# Patient Record
Sex: FEMALE | Race: WHITE | NOT HISPANIC OR LATINO | Employment: OTHER | URBAN - METROPOLITAN AREA
[De-identification: names, ages, dates, MRNs, and addresses within clinical notes are randomized per-mention and may not be internally consistent; named-entity substitution may affect disease eponyms.]

---

## 2017-02-21 ENCOUNTER — COMMUNICATION - HEALTHEAST (OUTPATIENT)
Dept: FAMILY MEDICINE | Facility: CLINIC | Age: 62
End: 2017-02-21

## 2017-02-21 DIAGNOSIS — J31.0 RHINITIS: ICD-10-CM

## 2017-02-25 ENCOUNTER — COMMUNICATION - HEALTHEAST (OUTPATIENT)
Dept: FAMILY MEDICINE | Facility: CLINIC | Age: 62
End: 2017-02-25

## 2017-02-25 DIAGNOSIS — I10 UNSPECIFIED ESSENTIAL HYPERTENSION: ICD-10-CM

## 2017-03-28 ENCOUNTER — COMMUNICATION - HEALTHEAST (OUTPATIENT)
Dept: FAMILY MEDICINE | Facility: CLINIC | Age: 62
End: 2017-03-28

## 2017-04-13 ENCOUNTER — HOSPITAL ENCOUNTER (OUTPATIENT)
Dept: MAMMOGRAPHY | Facility: HOSPITAL | Age: 62
Discharge: HOME | End: 2017-04-13
Attending: FAMILY MEDICINE

## 2017-04-13 DIAGNOSIS — Z12.31 VISIT FOR SCREENING MAMMOGRAM: ICD-10-CM

## 2017-04-18 ENCOUNTER — OFFICE VISIT - HEALTHEAST (OUTPATIENT)
Dept: FAMILY MEDICINE | Facility: CLINIC | Age: 62
End: 2017-04-18

## 2017-04-18 DIAGNOSIS — B96.89 ACUTE BACTERIAL SINUSITIS: ICD-10-CM

## 2017-04-18 DIAGNOSIS — R05.8 POST-VIRAL COUGH SYNDROME: ICD-10-CM

## 2017-04-18 DIAGNOSIS — J01.90 ACUTE BACTERIAL SINUSITIS: ICD-10-CM

## 2017-04-22 ENCOUNTER — COMMUNICATION - HEALTHEAST (OUTPATIENT)
Dept: FAMILY MEDICINE | Facility: CLINIC | Age: 62
End: 2017-04-22

## 2017-05-01 ENCOUNTER — COMMUNICATION - HEALTHEAST (OUTPATIENT)
Dept: SCHEDULING | Facility: CLINIC | Age: 62
End: 2017-05-01

## 2017-05-01 ENCOUNTER — OFFICE VISIT - HEALTHEAST (OUTPATIENT)
Dept: FAMILY MEDICINE | Facility: CLINIC | Age: 62
End: 2017-05-01

## 2017-05-01 DIAGNOSIS — J40 BRONCHITIS: ICD-10-CM

## 2017-05-01 RX ORDER — ALBUTEROL SULFATE 90 UG/1
2 AEROSOL, METERED RESPIRATORY (INHALATION) EVERY 6 HOURS PRN
Qty: 1 EACH | Refills: 3 | Status: SHIPPED | OUTPATIENT
Start: 2017-05-01 | End: 2022-08-22

## 2017-05-27 ENCOUNTER — COMMUNICATION - HEALTHEAST (OUTPATIENT)
Dept: FAMILY MEDICINE | Facility: CLINIC | Age: 62
End: 2017-05-27

## 2017-05-27 DIAGNOSIS — I10 UNSPECIFIED ESSENTIAL HYPERTENSION: ICD-10-CM

## 2017-06-20 ENCOUNTER — COMMUNICATION - HEALTHEAST (OUTPATIENT)
Dept: FAMILY MEDICINE | Facility: CLINIC | Age: 62
End: 2017-06-20

## 2017-07-03 ENCOUNTER — COMMUNICATION - HEALTHEAST (OUTPATIENT)
Dept: FAMILY MEDICINE | Facility: CLINIC | Age: 62
End: 2017-07-03

## 2017-07-03 DIAGNOSIS — J31.0 RHINITIS: ICD-10-CM

## 2017-07-05 ENCOUNTER — COMMUNICATION - HEALTHEAST (OUTPATIENT)
Dept: FAMILY MEDICINE | Facility: CLINIC | Age: 62
End: 2017-07-05

## 2017-07-05 DIAGNOSIS — F32.89 DEPRESSIVE DISORDER, NOT ELSEWHERE CLASSIFIED: ICD-10-CM

## 2017-09-09 ENCOUNTER — COMMUNICATION - HEALTHEAST (OUTPATIENT)
Dept: FAMILY MEDICINE | Facility: CLINIC | Age: 62
End: 2017-09-09

## 2017-09-09 DIAGNOSIS — F32.89 DEPRESSIVE DISORDER, NOT ELSEWHERE CLASSIFIED: ICD-10-CM

## 2017-10-03 ENCOUNTER — OFFICE VISIT - HEALTHEAST (OUTPATIENT)
Dept: FAMILY MEDICINE | Facility: CLINIC | Age: 62
End: 2017-10-03

## 2017-10-03 ENCOUNTER — COMMUNICATION - HEALTHEAST (OUTPATIENT)
Dept: TELEHEALTH | Facility: CLINIC | Age: 62
End: 2017-10-03

## 2017-10-03 DIAGNOSIS — M79.673 FOOT PAIN: ICD-10-CM

## 2017-10-03 DIAGNOSIS — F32.4 MAJOR DEPRESSIVE DISORDER WITH SINGLE EPISODE, IN PARTIAL REMISSION (H): ICD-10-CM

## 2017-10-03 DIAGNOSIS — J31.0 RHINITIS: ICD-10-CM

## 2017-10-03 DIAGNOSIS — I10 ESSENTIAL HYPERTENSION: ICD-10-CM

## 2017-10-03 RX ORDER — MOMETASONE FUROATE MONOHYDRATE 50 UG/1
SPRAY, METERED NASAL
Qty: 51 G | Refills: 3 | Status: SHIPPED | OUTPATIENT
Start: 2017-10-03 | End: 2022-08-22

## 2017-10-04 ENCOUNTER — COMMUNICATION - HEALTHEAST (OUTPATIENT)
Dept: FAMILY MEDICINE | Facility: CLINIC | Age: 62
End: 2017-10-04

## 2017-10-23 ENCOUNTER — COMMUNICATION - HEALTHEAST (OUTPATIENT)
Dept: FAMILY MEDICINE | Facility: CLINIC | Age: 62
End: 2017-10-23

## 2017-10-23 DIAGNOSIS — F32.A DEPRESSION: ICD-10-CM

## 2017-12-05 ENCOUNTER — COMMUNICATION - HEALTHEAST (OUTPATIENT)
Dept: FAMILY MEDICINE | Facility: CLINIC | Age: 62
End: 2017-12-05

## 2017-12-05 DIAGNOSIS — I10 ESSENTIAL HYPERTENSION: ICD-10-CM

## 2018-02-14 ENCOUNTER — COMMUNICATION - HEALTHEAST (OUTPATIENT)
Dept: SCHEDULING | Facility: CLINIC | Age: 63
End: 2018-02-14

## 2018-02-14 ENCOUNTER — OFFICE VISIT - HEALTHEAST (OUTPATIENT)
Dept: FAMILY MEDICINE | Facility: CLINIC | Age: 63
End: 2018-02-14

## 2018-02-14 DIAGNOSIS — R21 RASH: ICD-10-CM

## 2018-02-14 DIAGNOSIS — R32 INCONTINENCE: ICD-10-CM

## 2018-02-14 LAB
ALBUMIN UR-MCNC: NEGATIVE MG/DL
APPEARANCE UR: CLEAR
BACTERIA #/AREA URNS HPF: ABNORMAL HPF
BILIRUB UR QL STRIP: NEGATIVE
COLOR UR AUTO: YELLOW
GLUCOSE UR STRIP-MCNC: NEGATIVE MG/DL
HGB UR QL STRIP: ABNORMAL
KETONES UR STRIP-MCNC: NEGATIVE MG/DL
LEUKOCYTE ESTERASE UR QL STRIP: ABNORMAL
MUCOUS THREADS #/AREA URNS LPF: ABNORMAL LPF
NITRATE UR QL: NEGATIVE
PH UR STRIP: 7 [PH] (ref 5–8)
RBC #/AREA URNS AUTO: ABNORMAL HPF
SP GR UR STRIP: 1.02 (ref 1–1.03)
SQUAMOUS #/AREA URNS AUTO: ABNORMAL LPF
UROBILINOGEN UR STRIP-ACNC: ABNORMAL
WBC #/AREA URNS AUTO: ABNORMAL HPF

## 2018-02-14 ASSESSMENT — MIFFLIN-ST. JEOR: SCORE: 1758.98

## 2018-02-15 LAB — BACTERIA SPEC CULT: NO GROWTH

## 2018-02-26 ENCOUNTER — RECORDS - HEALTHEAST (OUTPATIENT)
Dept: ADMINISTRATIVE | Facility: OTHER | Age: 63
End: 2018-02-26

## 2018-05-27 ENCOUNTER — COMMUNICATION - HEALTHEAST (OUTPATIENT)
Dept: FAMILY MEDICINE | Facility: CLINIC | Age: 63
End: 2018-05-27

## 2018-05-27 DIAGNOSIS — I10 ESSENTIAL HYPERTENSION: ICD-10-CM

## 2018-05-30 ENCOUNTER — COMMUNICATION - HEALTHEAST (OUTPATIENT)
Dept: FAMILY MEDICINE | Facility: CLINIC | Age: 63
End: 2018-05-30

## 2018-10-16 ENCOUNTER — COMMUNICATION - HEALTHEAST (OUTPATIENT)
Dept: FAMILY MEDICINE | Facility: CLINIC | Age: 63
End: 2018-10-16

## 2018-10-16 DIAGNOSIS — F32.A DEPRESSION: ICD-10-CM

## 2018-10-17 ENCOUNTER — COMMUNICATION - HEALTHEAST (OUTPATIENT)
Dept: FAMILY MEDICINE | Facility: CLINIC | Age: 63
End: 2018-10-17

## 2018-10-17 DIAGNOSIS — J32.9 SINUSITIS: ICD-10-CM

## 2018-10-17 DIAGNOSIS — I10 ESSENTIAL HYPERTENSION: ICD-10-CM

## 2018-10-23 ENCOUNTER — AMBULATORY - HEALTHEAST (OUTPATIENT)
Dept: NURSING | Facility: CLINIC | Age: 63
End: 2018-10-23

## 2018-10-23 DIAGNOSIS — Z23 FLU VACCINE NEED: ICD-10-CM

## 2018-10-24 ENCOUNTER — COMMUNICATION - HEALTHEAST (OUTPATIENT)
Dept: FAMILY MEDICINE | Facility: CLINIC | Age: 63
End: 2018-10-24

## 2018-11-30 ENCOUNTER — COMMUNICATION - HEALTHEAST (OUTPATIENT)
Dept: FAMILY MEDICINE | Facility: CLINIC | Age: 63
End: 2018-11-30

## 2018-11-30 DIAGNOSIS — I10 ESSENTIAL HYPERTENSION: ICD-10-CM

## 2018-12-21 ENCOUNTER — COMMUNICATION - HEALTHEAST (OUTPATIENT)
Dept: FAMILY MEDICINE | Facility: CLINIC | Age: 63
End: 2018-12-21

## 2019-01-19 ENCOUNTER — COMMUNICATION - HEALTHEAST (OUTPATIENT)
Dept: FAMILY MEDICINE | Facility: CLINIC | Age: 64
End: 2019-01-19

## 2019-01-19 DIAGNOSIS — F32.A DEPRESSION: ICD-10-CM

## 2019-01-23 ENCOUNTER — COMMUNICATION - HEALTHEAST (OUTPATIENT)
Dept: FAMILY MEDICINE | Facility: CLINIC | Age: 64
End: 2019-01-23

## 2019-04-08 ENCOUNTER — RECORDS - HEALTHEAST (OUTPATIENT)
Dept: ADMINISTRATIVE | Facility: OTHER | Age: 64
End: 2019-04-08

## 2019-04-18 ENCOUNTER — COMMUNICATION - HEALTHEAST (OUTPATIENT)
Dept: FAMILY MEDICINE | Facility: CLINIC | Age: 64
End: 2019-04-18

## 2019-04-18 DIAGNOSIS — F32.A DEPRESSION: ICD-10-CM

## 2019-07-16 ENCOUNTER — COMMUNICATION - HEALTHEAST (OUTPATIENT)
Dept: FAMILY MEDICINE | Facility: CLINIC | Age: 64
End: 2019-07-16

## 2019-07-16 DIAGNOSIS — F32.A DEPRESSION: ICD-10-CM

## 2019-08-13 ENCOUNTER — OFFICE VISIT - HEALTHEAST (OUTPATIENT)
Dept: FAMILY MEDICINE | Facility: CLINIC | Age: 64
End: 2019-08-13

## 2019-08-13 ENCOUNTER — AMBULATORY - HEALTHEAST (OUTPATIENT)
Dept: FAMILY MEDICINE | Facility: CLINIC | Age: 64
End: 2019-08-13

## 2019-08-13 DIAGNOSIS — Z00.01 ENCOUNTER FOR GENERAL ADULT MEDICAL EXAMINATION WITH ABNORMAL FINDINGS: ICD-10-CM

## 2019-08-13 DIAGNOSIS — Z12.31 VISIT FOR SCREENING MAMMOGRAM: ICD-10-CM

## 2019-08-13 DIAGNOSIS — Z23 ENCOUNTER FOR IMMUNIZATION: ICD-10-CM

## 2019-08-13 DIAGNOSIS — R31.29 MICROSCOPIC HEMATURIA: ICD-10-CM

## 2019-08-13 DIAGNOSIS — F32.4 MAJOR DEPRESSIVE DISORDER WITH SINGLE EPISODE, IN PARTIAL REMISSION (H): ICD-10-CM

## 2019-08-13 DIAGNOSIS — E78.5 DYSLIPIDEMIA: ICD-10-CM

## 2019-08-13 DIAGNOSIS — I10 ESSENTIAL HYPERTENSION: ICD-10-CM

## 2019-08-13 LAB
ALBUMIN SERPL-MCNC: 4.2 G/DL (ref 3.5–5)
ALBUMIN UR-MCNC: NEGATIVE MG/DL
ALP SERPL-CCNC: 76 U/L (ref 45–120)
ALT SERPL W P-5'-P-CCNC: 26 U/L (ref 0–45)
ANION GAP SERPL CALCULATED.3IONS-SCNC: 6 MMOL/L (ref 5–18)
APPEARANCE UR: CLEAR
AST SERPL W P-5'-P-CCNC: 20 U/L (ref 0–40)
BACTERIA #/AREA URNS HPF: ABNORMAL HPF
BILIRUB SERPL-MCNC: 0.9 MG/DL (ref 0–1)
BILIRUB UR QL STRIP: ABNORMAL
BUN SERPL-MCNC: 15 MG/DL (ref 8–22)
CALCIUM SERPL-MCNC: 10.4 MG/DL (ref 8.5–10.5)
CHLORIDE BLD-SCNC: 105 MMOL/L (ref 98–107)
CHOLEST SERPL-MCNC: 169 MG/DL
CO2 SERPL-SCNC: 31 MMOL/L (ref 22–31)
COLOR UR AUTO: YELLOW
CREAT SERPL-MCNC: 0.78 MG/DL (ref 0.6–1.1)
FASTING STATUS PATIENT QL REPORTED: YES
GFR SERPL CREATININE-BSD FRML MDRD: >60 ML/MIN/1.73M2
GLUCOSE BLD-MCNC: 121 MG/DL (ref 70–125)
GLUCOSE UR STRIP-MCNC: NEGATIVE MG/DL
HDLC SERPL-MCNC: 36 MG/DL
HGB UR QL STRIP: ABNORMAL
KETONES UR STRIP-MCNC: NEGATIVE MG/DL
LDLC SERPL CALC-MCNC: 103 MG/DL
LEUKOCYTE ESTERASE UR QL STRIP: ABNORMAL
MUCOUS THREADS #/AREA URNS LPF: ABNORMAL LPF
NITRATE UR QL: NEGATIVE
PH UR STRIP: 6 [PH] (ref 5–8)
POTASSIUM BLD-SCNC: 4.2 MMOL/L (ref 3.5–5)
PROT SERPL-MCNC: 7 G/DL (ref 6–8)
RBC #/AREA URNS AUTO: ABNORMAL HPF
SODIUM SERPL-SCNC: 142 MMOL/L (ref 136–145)
SP GR UR STRIP: 1.02 (ref 1–1.03)
SQUAMOUS #/AREA URNS AUTO: ABNORMAL LPF
TRIGL SERPL-MCNC: 149 MG/DL
UROBILINOGEN UR STRIP-ACNC: ABNORMAL
WBC #/AREA URNS AUTO: ABNORMAL HPF

## 2019-08-13 ASSESSMENT — MIFFLIN-ST. JEOR: SCORE: 1711.59

## 2019-08-14 LAB — BACTERIA SPEC CULT: NO GROWTH

## 2019-08-27 ENCOUNTER — HOSPITAL ENCOUNTER (OUTPATIENT)
Dept: MAMMOGRAPHY | Facility: CLINIC | Age: 64
Discharge: HOME | End: 2019-08-27
Attending: FAMILY MEDICINE

## 2019-08-27 DIAGNOSIS — Z12.31 VISIT FOR SCREENING MAMMOGRAM: ICD-10-CM

## 2019-10-19 ENCOUNTER — COMMUNICATION - HEALTHEAST (OUTPATIENT)
Dept: FAMILY MEDICINE | Facility: CLINIC | Age: 64
End: 2019-10-19

## 2019-10-19 DIAGNOSIS — F32.A DEPRESSION: ICD-10-CM

## 2019-11-04 ENCOUNTER — COMMUNICATION - HEALTHEAST (OUTPATIENT)
Dept: FAMILY MEDICINE | Facility: CLINIC | Age: 64
End: 2019-11-04

## 2019-11-04 DIAGNOSIS — J32.9 SINUSITIS: ICD-10-CM

## 2020-02-03 ENCOUNTER — COMMUNICATION - HEALTHEAST (OUTPATIENT)
Dept: FAMILY MEDICINE | Facility: CLINIC | Age: 65
End: 2020-02-03

## 2020-02-03 DIAGNOSIS — I10 ESSENTIAL HYPERTENSION: ICD-10-CM

## 2020-07-02 ENCOUNTER — COMMUNICATION - HEALTHEAST (OUTPATIENT)
Dept: SCHEDULING | Facility: CLINIC | Age: 65
End: 2020-07-02

## 2020-07-02 ENCOUNTER — OFFICE VISIT - HEALTHEAST (OUTPATIENT)
Dept: FAMILY MEDICINE | Facility: CLINIC | Age: 65
End: 2020-07-02

## 2020-07-02 DIAGNOSIS — N39.0 ACUTE UTI: ICD-10-CM

## 2020-07-02 DIAGNOSIS — R30.0 DYSURIA: ICD-10-CM

## 2020-07-02 LAB
ALBUMIN UR-MCNC: ABNORMAL MG/DL
APPEARANCE UR: ABNORMAL
BACTERIA #/AREA URNS HPF: ABNORMAL HPF
BILIRUB UR QL STRIP: NEGATIVE
COLOR UR AUTO: YELLOW
GLUCOSE UR STRIP-MCNC: NEGATIVE MG/DL
HGB UR QL STRIP: ABNORMAL
KETONES UR STRIP-MCNC: NEGATIVE MG/DL
LEUKOCYTE ESTERASE UR QL STRIP: ABNORMAL
NITRATE UR QL: NEGATIVE
PH UR STRIP: 7 [PH] (ref 5–8)
RBC #/AREA URNS AUTO: ABNORMAL HPF
SP GR UR STRIP: 1.02 (ref 1–1.03)
SQUAMOUS #/AREA URNS AUTO: ABNORMAL LPF
UROBILINOGEN UR STRIP-ACNC: ABNORMAL
WBC #/AREA URNS AUTO: ABNORMAL HPF
WBC CLUMPS #/AREA URNS HPF: PRESENT /[HPF]

## 2020-07-02 ASSESSMENT — PATIENT HEALTH QUESTIONNAIRE - PHQ9: SUM OF ALL RESPONSES TO PHQ QUESTIONS 1-9: 0

## 2020-07-05 LAB — BACTERIA SPEC CULT: ABNORMAL

## 2020-08-11 ENCOUNTER — RECORDS - HEALTHEAST (OUTPATIENT)
Dept: ADMINISTRATIVE | Facility: OTHER | Age: 65
End: 2020-08-11

## 2020-08-17 ENCOUNTER — OFFICE VISIT - HEALTHEAST (OUTPATIENT)
Dept: FAMILY MEDICINE | Facility: CLINIC | Age: 65
End: 2020-08-17

## 2020-08-17 DIAGNOSIS — Z00.01 ENCOUNTER FOR GENERAL ADULT MEDICAL EXAMINATION WITH ABNORMAL FINDINGS: ICD-10-CM

## 2020-08-17 DIAGNOSIS — I10 ESSENTIAL HYPERTENSION: ICD-10-CM

## 2020-08-17 DIAGNOSIS — F32.4 MAJOR DEPRESSIVE DISORDER WITH SINGLE EPISODE, IN PARTIAL REMISSION (H): ICD-10-CM

## 2020-08-17 DIAGNOSIS — E78.5 DYSLIPIDEMIA: ICD-10-CM

## 2020-08-17 DIAGNOSIS — R31.29 MICROSCOPIC HEMATURIA: ICD-10-CM

## 2020-08-17 LAB
ALBUMIN SERPL-MCNC: 4.3 G/DL (ref 3.5–5)
ALBUMIN UR-MCNC: NEGATIVE MG/DL
ALP SERPL-CCNC: 71 U/L (ref 45–120)
ALT SERPL W P-5'-P-CCNC: 23 U/L (ref 0–45)
ANION GAP SERPL CALCULATED.3IONS-SCNC: 9 MMOL/L (ref 5–18)
APPEARANCE UR: CLEAR
AST SERPL W P-5'-P-CCNC: 18 U/L (ref 0–40)
BACTERIA #/AREA URNS HPF: ABNORMAL HPF
BILIRUB SERPL-MCNC: 1.6 MG/DL (ref 0–1)
BILIRUB UR QL STRIP: NEGATIVE
BUN SERPL-MCNC: 15 MG/DL (ref 8–22)
CALCIUM SERPL-MCNC: 10.3 MG/DL (ref 8.5–10.5)
CHLORIDE BLD-SCNC: 101 MMOL/L (ref 98–107)
CHOLEST SERPL-MCNC: 207 MG/DL
CO2 SERPL-SCNC: 31 MMOL/L (ref 22–31)
COLOR UR AUTO: YELLOW
CREAT SERPL-MCNC: 0.78 MG/DL (ref 0.6–1.1)
FASTING STATUS PATIENT QL REPORTED: YES
GFR SERPL CREATININE-BSD FRML MDRD: >60 ML/MIN/1.73M2
GLUCOSE BLD-MCNC: 116 MG/DL (ref 70–125)
GLUCOSE UR STRIP-MCNC: NEGATIVE MG/DL
HDLC SERPL-MCNC: 39 MG/DL
HGB UR QL STRIP: ABNORMAL
KETONES UR STRIP-MCNC: NEGATIVE MG/DL
LDLC SERPL CALC-MCNC: 134 MG/DL
LEUKOCYTE ESTERASE UR QL STRIP: ABNORMAL
MUCOUS THREADS #/AREA URNS LPF: ABNORMAL LPF
NITRATE UR QL: NEGATIVE
PH UR STRIP: 6.5 [PH] (ref 5–8)
POTASSIUM BLD-SCNC: 4.2 MMOL/L (ref 3.5–5)
PROT SERPL-MCNC: 7.2 G/DL (ref 6–8)
RBC #/AREA URNS AUTO: ABNORMAL HPF
SODIUM SERPL-SCNC: 141 MMOL/L (ref 136–145)
SP GR UR STRIP: 1.02 (ref 1–1.03)
SQUAMOUS #/AREA URNS AUTO: ABNORMAL LPF
TRANS CELLS #/AREA URNS HPF: ABNORMAL LPF
TRIGL SERPL-MCNC: 168 MG/DL
UROBILINOGEN UR STRIP-ACNC: ABNORMAL
WBC #/AREA URNS AUTO: ABNORMAL HPF
WBC CLUMPS #/AREA URNS HPF: PRESENT /[HPF]

## 2020-08-17 ASSESSMENT — PATIENT HEALTH QUESTIONNAIRE - PHQ9: SUM OF ALL RESPONSES TO PHQ QUESTIONS 1-9: 1

## 2020-08-17 ASSESSMENT — MIFFLIN-ST. JEOR: SCORE: 1685.28

## 2020-08-18 LAB — BACTERIA SPEC CULT: NO GROWTH

## 2020-08-24 ENCOUNTER — COMMUNICATION - HEALTHEAST (OUTPATIENT)
Dept: FAMILY MEDICINE | Facility: CLINIC | Age: 65
End: 2020-08-24

## 2020-08-24 DIAGNOSIS — F32.A DEPRESSION: ICD-10-CM

## 2020-08-24 DIAGNOSIS — I10 ESSENTIAL HYPERTENSION: ICD-10-CM

## 2020-08-26 RX ORDER — CITALOPRAM HYDROBROMIDE 20 MG/1
20 TABLET ORAL DAILY
Qty: 90 TABLET | Refills: 3 | Status: SHIPPED | OUTPATIENT
Start: 2020-08-26 | End: 2021-10-10

## 2020-08-26 RX ORDER — ATENOLOL 50 MG/1
TABLET ORAL
Qty: 90 TABLET | Refills: 3 | Status: SHIPPED | OUTPATIENT
Start: 2020-08-26 | End: 2021-08-17

## 2020-08-26 RX ORDER — CHLORTHALIDONE 25 MG/1
TABLET ORAL
Qty: 90 TABLET | Refills: 3 | Status: SHIPPED | OUTPATIENT
Start: 2020-08-26 | End: 2021-08-17

## 2020-09-15 ENCOUNTER — RECORDS - HEALTHEAST (OUTPATIENT)
Dept: ADMINISTRATIVE | Facility: OTHER | Age: 65
End: 2020-09-15

## 2020-10-19 ENCOUNTER — RECORDS - HEALTHEAST (OUTPATIENT)
Dept: ADMINISTRATIVE | Facility: OTHER | Age: 65
End: 2020-10-19

## 2020-10-30 ENCOUNTER — COMMUNICATION - HEALTHEAST (OUTPATIENT)
Dept: FAMILY MEDICINE | Facility: CLINIC | Age: 65
End: 2020-10-30

## 2020-10-30 DIAGNOSIS — J32.9 SINUSITIS: ICD-10-CM

## 2020-11-10 ENCOUNTER — RECORDS - HEALTHEAST (OUTPATIENT)
Dept: ADMINISTRATIVE | Facility: OTHER | Age: 65
End: 2020-11-10

## 2021-04-28 ENCOUNTER — COMMUNICATION - HEALTHEAST (OUTPATIENT)
Dept: FAMILY MEDICINE | Facility: CLINIC | Age: 66
End: 2021-04-28

## 2021-04-28 DIAGNOSIS — J32.9 SINUSITIS: ICD-10-CM

## 2021-05-25 ENCOUNTER — RECORDS - HEALTHEAST (OUTPATIENT)
Dept: ADMINISTRATIVE | Facility: CLINIC | Age: 66
End: 2021-05-25

## 2021-05-26 ENCOUNTER — RECORDS - HEALTHEAST (OUTPATIENT)
Dept: ADMINISTRATIVE | Facility: CLINIC | Age: 66
End: 2021-05-26

## 2021-05-26 ASSESSMENT — PATIENT HEALTH QUESTIONNAIRE - PHQ9: SUM OF ALL RESPONSES TO PHQ QUESTIONS 1-9: 0

## 2021-05-27 ENCOUNTER — RECORDS - HEALTHEAST (OUTPATIENT)
Dept: ADMINISTRATIVE | Facility: CLINIC | Age: 66
End: 2021-05-27

## 2021-05-27 ASSESSMENT — PATIENT HEALTH QUESTIONNAIRE - PHQ9: SUM OF ALL RESPONSES TO PHQ QUESTIONS 1-9: 1

## 2021-05-27 NOTE — TELEPHONE ENCOUNTER
RN cannot approve Refill Request    RN can NOT refill this medication overdue for office visits and/or labs.    Catarino Smith, [facility] Connection Triage/Med Refill 4/18/2019    Requested Prescriptions   Pending Prescriptions Disp Refills     citalopram (CELEXA) 20 MG tablet [Pharmacy Med Name: Citalopram Hydrobromide Oral Tablet 20 MG] 90 tablet 0     Sig: Take 1 tablet (20 mg total) by mouth daily.       SSRI Refill Protocol  Failed - 4/18/2019  7:01 AM        Failed - PCP or prescribing provider visit in last year     Last office visit with prescriber/PCP: 10/3/2017 Catarino Nicholson MD OR same dept: Visit date not found OR same specialty: 2/14/2018 Lizzette Bravo MD  Last physical: 5/20/2016 Last MTM visit: Visit date not found   Next visit within 3 mo: Visit date not found  Next physical within 3 mo: Visit date not found  Prescriber OR PCP: Catarino Nicholson MD  Last diagnosis associated with med order: 1. Depression  - citalopram (CELEXA) 20 MG tablet [Pharmacy Med Name: Citalopram Hydrobromide Oral Tablet 20 MG]; Take 1 tablet (20 mg total) by mouth daily.  Dispense: 90 tablet; Refill: 0    If protocol passes may refill for 12 months if within 3 months of last provider visit (or a total of 15 months).

## 2021-05-28 ENCOUNTER — RECORDS - HEALTHEAST (OUTPATIENT)
Dept: ADMINISTRATIVE | Facility: CLINIC | Age: 66
End: 2021-05-28

## 2021-05-29 ENCOUNTER — RECORDS - HEALTHEAST (OUTPATIENT)
Dept: ADMINISTRATIVE | Facility: CLINIC | Age: 66
End: 2021-05-29

## 2021-05-30 ENCOUNTER — RECORDS - HEALTHEAST (OUTPATIENT)
Dept: ADMINISTRATIVE | Facility: CLINIC | Age: 66
End: 2021-05-30

## 2021-05-30 VITALS — WEIGHT: 260.3 LBS | BODY MASS INDEX: 40.17 KG/M2

## 2021-05-30 NOTE — TELEPHONE ENCOUNTER
RN cannot approve Refill Request    RN can NOT refill this medication overdue for office visits and/or labs.    Catarino Smith, [facility] Connection Triage/Med Refill 7/16/2019    Requested Prescriptions   Pending Prescriptions Disp Refills     citalopram (CELEXA) 20 MG tablet [Pharmacy Med Name: Citalopram Hydrobromide Oral Tablet 20 MG] 90 tablet 0     Sig: Take 1 tablet (20 mg total) by mouth daily.       SSRI Refill Protocol  Failed - 7/16/2019  2:07 PM        Failed - PCP or prescribing provider visit in last year     Last office visit with prescriber/PCP: 10/3/2017 Catarino Nicholson MD OR same dept: Visit date not found OR same specialty: 2/14/2018 Lizzette Bravo MD  Last physical: 5/20/2016 Last MTM visit: Visit date not found   Next visit within 3 mo: Visit date not found  Next physical within 3 mo: Visit date not found  Prescriber OR PCP: Catarino Nicholson MD  Last diagnosis associated with med order: 1. Depression  - citalopram (CELEXA) 20 MG tablet [Pharmacy Med Name: Citalopram Hydrobromide Oral Tablet 20 MG]; Take 1 tablet (20 mg total) by mouth daily.  Dispense: 90 tablet; Refill: 0    If protocol passes may refill for 12 months if within 3 months of last provider visit (or a total of 15 months).

## 2021-05-30 NOTE — TELEPHONE ENCOUNTER
RN cannot approve Refill Request    RN can NOT refill this medication overdue for office visits and/or labs.    Catarino Smith, [facility] Connection Triage/Med Refill 7/16/2019    Requested Prescriptions   Pending Prescriptions Disp Refills     citalopram (CELEXA) 20 MG tablet 90 tablet 0     Sig: Take 1 tablet (20 mg total) by mouth daily.       SSRI Refill Protocol  Failed - 7/16/2019  8:38 AM        Failed - PCP or prescribing provider visit in last year     Last office visit with prescriber/PCP: 10/3/2017 Catarino Nicholson MD OR same dept: Visit date not found OR same specialty: 2/14/2018 Lizzette Bravo MD  Last physical: 5/20/2016 Last MTM visit: Visit date not found   Next visit within 3 mo: Visit date not found  Next physical within 3 mo: Visit date not found  Prescriber OR PCP: Catarino Nicholson MD  Last diagnosis associated with med order: 1. Depression  - citalopram (CELEXA) 20 MG tablet; Take 1 tablet (20 mg total) by mouth daily.  Dispense: 90 tablet; Refill: 0    If protocol passes may refill for 12 months if within 3 months of last provider visit (or a total of 15 months).

## 2021-05-31 ENCOUNTER — RECORDS - HEALTHEAST (OUTPATIENT)
Dept: ADMINISTRATIVE | Facility: CLINIC | Age: 66
End: 2021-05-31

## 2021-05-31 VITALS — BODY MASS INDEX: 40.12 KG/M2 | WEIGHT: 260 LBS

## 2021-05-31 NOTE — PROGRESS NOTES
Assessment and Plan:     Clarisse was seen today for annual wellness visit.    Encounter for general adult medical examination with abnormal findings  -     Pneumococcal conjugate vaccine 13-valent 6wks-17yrs; >50yrs  -     Ambulatory referral for Colonoscopy    Visit for screening mammogram  -     Mammo Screening Bilateral; Future    Hypertension  -     Comprehensive Metabolic Panel    Major depressive disorder with single episode, in partial remission (H)    Dyslipidemia  -     Lipid Cascade    Encounter for immunization   -     Pneumococcal conjugate vaccine 13-valent 6wks-17yrs; >50yrs    Microscopic hematuria  -     Urinalysis-UC if Indicated        The patient's current medical problems were reviewed.    The following high BMI interventions were performed this visit: encouragement to exercise and weight monitoring  The following health maintenance schedule was reviewed with the patient and provided in printed form in the after visit summary:   Health Maintenance   Topic Date Due     HEPATITIS C SCREENING  1955     ADVANCE CARE PLANNING  03/11/1971     ZOSTER VACCINES (2 of 3) 10/16/2013     MEDICARE ANNUAL WELLNESS VISIT  03/11/2018     DXA SCAN  03/11/2018     PNEUMOCOCCAL POLYSACCHARIDE VACCINE AGE 65 AND OVER  03/11/2018     PNEUMOCOCCAL CONJUGATE VACCINE FOR ADULTS (PCV13 OR PREVNAR)  03/11/2018     FALL RISK ASSESSMENT  03/11/2018     DEPRESSION FOLLOW UP  04/03/2018     MAMMOGRAM  04/13/2019     INFLUENZA VACCINE RULE BASED (1) 08/01/2019     TD 18+ HE  10/15/2020     COLONOSCOPY  06/23/2021        Subjective:   Chief Complaint: Clarisse Romero is an 64 y.o. female here for an Annual Wellness visit.   HPI: Her medical history significant for hypertension she is on atenolol and chlorthalidone blood pressure is well controlled.  She is chronic rhinitis uses Nasonex which works well.  She is due for routine screening.  She is overdue for mammography.  She is a family history of colon cancer in her father  at a young age.  Her last colonoscopy was in 2011, she is 3 years overdue for 5-year follow-up because of the family history she is willing to get this scheduled as soon as possible.  Discussed update of immunizations including Prevnar 13, shingles vaccine and flu shot.  She will get the Prevnar 13 today, she will obtain flu shot at a later date, she will look into coverage for shingles vaccine.  She was seen this past summer for acute cystitis and had noted hematuria.  Records reviewed in care everywhere.  Discussed follow-up.  Patient does report urinary issues which are more common including occasional bouts of incontinence and sense of urgency.  Discussed this in detail.  She has a history of depression that is in remission she remains on citalopram.    Review of Systems: Please see above.  The rest of the review of systems are negative for all systems.    Patient Care Team:  Catarino Nicholson MD as PCP - General     Patient Active Problem List   Diagnosis     Major depression in partial remission (H)     Migraine Headache     Vaginal Discharge     Pain During Urination (Dysuria)     Herpes Zoster (Shingles)     Joint Pain, Localized In The Knee     Sprained Right Knee     Closed Fracture Of The Right Lateral Tibial Plateau     Contusion With Intact Skin Surface Of The Right Great Toenail     Hypertension     Acute Sinusitis     Skin Neoplasm Of Uncertain Behavior     Sinusitis     No past medical history on file.   Past Surgical History:   Procedure Laterality Date     CARPAL TUNNEL RELEASE  06/2016     TRIGGER FINGER RELEASE  06/2016      Family History   Problem Relation Age of Onset     Breast cancer Maternal Aunt       Social History     Socioeconomic History     Marital status:      Spouse name: Not on file     Number of children: Not on file     Years of education: Not on file     Highest education level: Not on file   Occupational History     Not on file   Social Needs     Financial resource  strain: Not on file     Food insecurity:     Worry: Not on file     Inability: Not on file     Transportation needs:     Medical: Not on file     Non-medical: Not on file   Tobacco Use     Smoking status: Never Smoker     Smokeless tobacco: Never Used   Substance and Sexual Activity     Alcohol use: Not on file     Drug use: Not on file     Sexual activity: Not on file   Lifestyle     Physical activity:     Days per week: Not on file     Minutes per session: Not on file     Stress: Not on file   Relationships     Social connections:     Talks on phone: Not on file     Gets together: Not on file     Attends [employer] service: Not on file     Active member of club or organization: Not on file     Attends meetings of clubs or organizations: Not on file     Relationship status: Not on file     Intimate partner violence:     Fear of current or ex partner: Not on file     Emotionally abused: Not on file     Physically abused: Not on file     Forced sexual activity: Not on file   Other Topics Concern     Not on file   Social History Narrative     Not on file      Current Outpatient Medications   Medication Sig Dispense Refill     atenolol (TENORMIN) 50 MG tablet Take 1 tablet (50 mg total) by mouth daily. 90 tablet 3     chlorthalidone (HYGROTEN) 25 MG tablet Take 1 tablet (25 mg total) by mouth daily. 90 tablet 3     citalopram (CELEXA) 20 MG tablet Take 1 tablet (20 mg total) by mouth daily. 90 tablet 0     fluticasone (FLONASE ALLERGY RELIEF) 50 mcg/actuation nasal spray Spray 2 sprays in each nostril once daily. 16 g 12     mometasone (NASONEX) 50 mcg/actuation nasal spray SHAKE LIQUID AND USE 1 SPRAY IN EACH NOSTRIL TWICE DAILY 51 g 3     albuterol (PROAIR HFA;PROVENTIL HFA;VENTOLIN HFA) 90 mcg/actuation inhaler Inhale 2 puffs every 6 (six) hours as needed for wheezing. 1 each 3     calcium carbonate (OS-ALYSSIA) 600 mg (1,500 mg) tablet Take 600 mg by mouth 2 (two) times a day with meals.       No current  "facility-administered medications for this visit.       Objective:   Vital Signs:   Visit Vitals  /76   Pulse 69   Ht 5' 8\" (1.727 m)   Wt (!) 249 lb 12.8 oz (113.3 kg)   SpO2 95%   BMI 37.98 kg/m         VisionScreening:  No exam data present     PHYSICAL EXAM        General Appearance:    Alert, cooperative, no distress   Eyes:   No scleral icterus or conjunctival irritation       Ears:    Normal TM's and external ear canals, both ears   Throat:   Lips, mucosa, and tongue normal; teeth and gums normal   Neck:   Supple, symmetrical, trachea midline, no adenopathy;        thyroid:  No enlargement/tenderness/nodules   Lungs:     Clear to auscultation bilaterally, respirations unlabored, no wheezes or crackles   Heart:    Regular rate and rhythm,  No murmur   Abdomen:    Soft, no distention, no tenderness on palpation, no masses, no organomegaly     Extremities:  No edema, no joint swelling or redness, no evidence of any injuries   Skin:  No concerning skin findings, no suspicious moles, no rashes   Neurologic:  On gross examination there is no motor or sensory deficit.  Patient walks with a normal gait           Assessment Results 8/13/2019   Activities of Daily Living No help needed   Instrumental Activities of Daily Living No help needed   Mini Cog Total Score 4   Some recent data might be hidden     A Mini-Cog score of 0-2 suggests the possibility of dementia, score of 3-5 suggests no dementia    Identified Health Risks:     She is at risk for lack of exercise and has been provided with information to increase physical activity for the benefit of her well-being.  Information on urinary incontinence and treatment options given to patient.  Patient's advanced directive was discussed and I am comfortable with the patient's wishes.        "

## 2021-06-01 ENCOUNTER — RECORDS - HEALTHEAST (OUTPATIENT)
Dept: FAMILY MEDICINE | Facility: CLINIC | Age: 66
End: 2021-06-01

## 2021-06-01 ENCOUNTER — RECORDS - HEALTHEAST (OUTPATIENT)
Dept: ADMINISTRATIVE | Facility: CLINIC | Age: 66
End: 2021-06-01

## 2021-06-01 VITALS — WEIGHT: 262 LBS | HEIGHT: 68 IN | BODY MASS INDEX: 39.71 KG/M2

## 2021-06-01 DIAGNOSIS — J32.9 SINUSITIS: ICD-10-CM

## 2021-06-02 ENCOUNTER — RECORDS - HEALTHEAST (OUTPATIENT)
Dept: ADMINISTRATIVE | Facility: CLINIC | Age: 66
End: 2021-06-02

## 2021-06-02 NOTE — TELEPHONE ENCOUNTER
Refill Approved    Rx renewed per Medication Renewal Policy. Medication was last renewed on 7/16/19.  Last OV was on 8/13/19    Maya Carson, [facility] Connection Triage/Med Refill 10/19/2019     Requested Prescriptions   Pending Prescriptions Disp Refills     citalopram (CELEXA) 20 MG tablet [Pharmacy Med Name: Citalopram Hydrobromide Oral Tablet 20 MG] 90 tablet 0     Sig: Take 1 tablet (20 mg total) by mouth daily.       SSRI Refill Protocol  Passed - 10/19/2019 12:38 PM        Passed - PCP or prescribing provider visit in last year     Last office visit with prescriber/PCP: 10/3/2017 Catarino Nicholson MD OR same dept: Visit date not found OR same specialty: 2/14/2018 Lizzette Bravo MD  Last physical: 8/13/2019 Last MTM visit: Visit date not found   Next visit within 3 mo: Visit date not found  Next physical within 3 mo: Visit date not found  Prescriber OR PCP: Catarino Nicholson MD  Last diagnosis associated with med order: 1. Depression  - citalopram (CELEXA) 20 MG tablet [Pharmacy Med Name: Citalopram Hydrobromide Oral Tablet 20 MG]; Take 1 tablet (20 mg total) by mouth daily.  Dispense: 90 tablet; Refill: 0    If protocol passes may refill for 12 months if within 3 months of last provider visit (or a total of 15 months).

## 2021-06-02 NOTE — TELEPHONE ENCOUNTER
Refill Request  Did you contact pharmacy: Yes  Medication name:   Requested Prescriptions     Pending Prescriptions Disp Refills     citalopram (CELEXA) 20 MG tablet [Pharmacy Med Name: Citalopram Hydrobromide Oral Tablet 20 MG] 90 tablet 0     Sig: Take 1 tablet (20 mg total) by mouth daily.     Who prescribed the medication: Bharat  [facility]  Is patient out of medication: Yes  Patient notified refills processed in 72 hours:  yes  Okay to leave a detailed message: yes    Maya Carson RN, BSN Care Connection Triage Nurse

## 2021-06-03 VITALS — BODY MASS INDEX: 37.86 KG/M2 | HEIGHT: 68 IN | WEIGHT: 249.8 LBS

## 2021-06-03 NOTE — TELEPHONE ENCOUNTER
Refill Approved    Rx renewed per Medication Renewal Policy. Medication was last renewed on 10/18/18    Malu Ross, [facility] Connection Triage/Med Refill 11/4/2019     Requested Prescriptions   Pending Prescriptions Disp Refills     fluticasone propionate (FLONASE) 50 mcg/actuation nasal spray [Pharmacy Med Name: Fluticasone Propionate Nasal Suspension 50 MCG/ACT] 16 g 11     Sig: Spray 2 sprays in each nostril once daily.       Nasal Steroid Refill Protocol Passed - 11/4/2019  7:01 AM        Passed - Patient has had office visit/physical in last 2 years     Last office visit with prescriber/PCP: Visit date not found OR same dept: Visit date not found OR same specialty: 2/14/2018 Lizzette Bravo MD Last physical: 8/13/2019 Last MTM visit: Visit date not found    Next appt within 3 mo: Visit date not found  Next physical within 3 mo: Visit date not found  Prescriber OR PCP: Catarino Nicholson MD  Last diagnosis associated with med order: 1. Sinusitis  - fluticasone propionate (FLONASE) 50 mcg/actuation nasal spray [Pharmacy Med Name: Fluticasone Propionate Nasal Suspension 50 MCG/ACT]; Spray 2 sprays in each nostril once daily.  Dispense: 16 g; Refill: 11     If protocol passes may refill for 12 months if within 3 months of last provider visit (or a total of 15 months).

## 2021-06-04 VITALS
DIASTOLIC BLOOD PRESSURE: 76 MMHG | OXYGEN SATURATION: 96 % | HEIGHT: 68 IN | WEIGHT: 244 LBS | HEART RATE: 85 BPM | BODY MASS INDEX: 36.98 KG/M2 | SYSTOLIC BLOOD PRESSURE: 126 MMHG

## 2021-06-04 VITALS
OXYGEN SATURATION: 95 % | DIASTOLIC BLOOD PRESSURE: 72 MMHG | SYSTOLIC BLOOD PRESSURE: 118 MMHG | BODY MASS INDEX: 37.86 KG/M2 | WEIGHT: 249 LBS | HEART RATE: 70 BPM

## 2021-06-05 ENCOUNTER — RECORDS - HEALTHEAST (OUTPATIENT)
Dept: FAMILY MEDICINE | Facility: CLINIC | Age: 66
End: 2021-06-05

## 2021-06-05 DIAGNOSIS — J32.9 CHRONIC SINUSITIS: ICD-10-CM

## 2021-06-05 NOTE — TELEPHONE ENCOUNTER
Refill Approved    Rx renewed per Medication Renewal Policy. Medication was last renewed on 1/22/19.    Magali Trimble, [facility] Connection Triage/Med Refill 2/3/2020     Requested Prescriptions   Pending Prescriptions Disp Refills     atenoloL (TENORMIN) 50 MG tablet [Pharmacy Med Name: Atenolol Oral Tablet 50 MG] 90 tablet 2     Sig: Take 1 tablet by mouth once daily.       Beta-Blockers Refill Protocol Passed - 2/3/2020  3:01 PM        Passed - PCP or prescribing provider visit in past 12 months or next 3 months     Last office visit with prescriber/PCP: 10/3/2017 Catarino Nicholson MD OR same dept: Visit date not found OR same specialty: 2/14/2018 Lizzette Bravo MD  Last physical: 8/13/2019 Last MTM visit: Visit date not found   Next visit within 3 mo: Visit date not found  Next physical within 3 mo: Visit date not found  Prescriber OR PCP: Catarino Nicholson MD  Last diagnosis associated with med order: There are no diagnoses linked to this encounter.  If protocol passes may refill for 12 months if within 3 months of last provider visit (or a total of 15 months).             Passed - Blood pressure filed in past 12 months     BP Readings from Last 1 Encounters:   08/13/19 124/76             chlorthalidone (HYGROTEN) 25 MG tablet [Pharmacy Med Name: Chlorthalidone Oral Tablet 25 MG] 90 tablet 2     Sig: Take 1 tablet by mouth once daily.       Diuretics/Combination Diuretics Refill Protocol  Passed - 2/3/2020  3:01 PM        Passed - Visit with PCP or prescribing provider visit in past 12 months     Last office visit with prescriber/PCP: 10/3/2017 Catarino Nicholson MD OR same dept: Visit date not found OR same specialty: 2/14/2018 Lizzette Bravo MD  Last physical: 8/13/2019 Last MTM visit: Visit date not found   Next visit within 3 mo: Visit date not found  Next physical within 3 mo: Visit date not found  Prescriber OR PCP: Catarino Nicholson MD  Last diagnosis associated with med order: There are no  diagnoses linked to this encounter.  If protocol passes may refill for 12 months if within 3 months of last provider visit (or a total of 15 months).             Passed - Serum Potassium in past 12 months      Lab Results   Component Value Date    Potassium 4.2 08/13/2019             Passed - Serum Sodium in past 12 months      Lab Results   Component Value Date    Sodium 142 08/13/2019             Passed - Blood pressure on file in past 12 months     BP Readings from Last 1 Encounters:   08/13/19 124/76             Passed - Serum Creatinine in past 12 months      Creatinine   Date Value Ref Range Status   08/13/2019 0.78 0.60 - 1.10 mg/dL Final

## 2021-06-09 NOTE — PROGRESS NOTES
Assessment/Plan:     1. Acute UTI  cephalexin (KEFLEX) 250 MG capsule   2. Dysuria  Urinalysis-UC if Indicated    Culture, Urine       Diagnoses and all orders for this visit:    Acute UTI  -     cephalexin (KEFLEX) 250 MG capsule; Take 1 capsule (250 mg total) by mouth 2 (two) times a day for 10 days.  Dispense: 20 capsule; Refill: 0  -Urinalysis is abnormal and is consistent with acute urinary tract infection will send in prescription for cephalexin twice daily for 10 days.  Counseled her on use of medication side effects.  Encouraged increased fluid intake..  Will send for culture.  Notify us if symptoms worsen, do not improve or if new concerns develop    Dysuria  -     Urinalysis-UC if Indicated  -     Culture, Urine               Subjective:      Clarisse Romero is a 65 y.o. female who comes in today for concern about possible urinary tract infection.  States that symptoms started several days ago.  She has had urgency as well as incomplete emptying of the bladder.  She has had burning with urination.  Today she noticed some pink on the toilet paper after wiping with urination.  She has otherwise felt fine.  She has not had fevers or chills.  No nausea or vomiting.  No back pain or lower abdominal pain.  She was most recently treated for a UTI in February of this year when she was in [place].  She was prescribed cephalexin 250 mg twice daily for 10 days.  States that this cleared up her symptoms.  Reports that her symptoms were more severe at that time and she was passing large clots with urination.  She is hoping to get started on treatments if symptoms do not get that severe this time around.  She is otherwise doing well and has no other concerns or questions.  We reviewed medications and allergies and updated the chart.  Review of systems is otherwise negative    Current Outpatient Medications   Medication Sig Dispense Refill     albuterol (PROAIR HFA;PROVENTIL HFA;VENTOLIN HFA) 90 mcg/actuation inhaler  Inhale 2 puffs every 6 (six) hours as needed for wheezing. 1 each 3     atenoloL (TENORMIN) 50 MG tablet Take 1 tablet by mouth once daily. 90 tablet 1     chlorthalidone (HYGROTEN) 25 MG tablet Take 1 tablet by mouth once daily. 90 tablet 1     citalopram (CELEXA) 20 MG tablet Take 1 tablet (20 mg total) by mouth daily. 90 tablet 3     fluticasone propionate (FLONASE) 50 mcg/actuation nasal spray Spray 2 sprays in each nostril once daily. 16 g 11     mometasone (NASONEX) 50 mcg/actuation nasal spray SHAKE LIQUID AND USE 1 SPRAY IN EACH NOSTRIL TWICE DAILY 51 g 3     cephalexin (KEFLEX) 250 MG capsule Take 1 capsule (250 mg total) by mouth 2 (two) times a day for 10 days. 20 capsule 0     No current facility-administered medications for this visit.        Past Medical History, Family History, and Social History reviewed.  No past medical history on file.  Past Surgical History:   Procedure Laterality Date     CARPAL TUNNEL RELEASE  06/2016     TRIGGER FINGER RELEASE  06/2016     Patient has no known allergies.  Family History   Problem Relation Age of Onset     Breast cancer Maternal Aunt      Social History     Socioeconomic History     Marital status:      Spouse name: Not on file     Number of children: Not on file     Years of education: Not on file     Highest education level: Not on file   Occupational History     Not on file   Social Needs     Financial resource strain: Not on file     Food insecurity     Worry: Not on file     Inability: Not on file     Transportation needs     Medical: Not on file     Non-medical: Not on file   Tobacco Use     Smoking status: Never Smoker     Smokeless tobacco: Never Used   Substance and Sexual Activity     Alcohol use: Not on file     Drug use: Not on file     Sexual activity: Not on file   Lifestyle     Physical activity     Days per week: Not on file     Minutes per session: Not on file     Stress: Not on file   Relationships     Social connections     Talks on  phone: Not on file     Gets together: Not on file     Attends L[employer] service: Not on file     Active member of club or organization: Not on file     Attends meetings of clubs or organizations: Not on file     Relationship status: Not on file     Intimate partner violence     Fear of current or ex partner: Not on file     Emotionally abused: Not on file     Physically abused: Not on file     Forced sexual activity: Not on file   Other Topics Concern     Not on file   Social History Narrative     Not on file         Review of systems is as stated in HPI, and the remainder of the 10 system review is otherwise negative.    Objective:     Vitals:    07/02/20 1135   BP: 118/72   Patient Site: Left Arm   Patient Position: Sitting   Cuff Size: Adult Large   Pulse: 70   SpO2: 95%   Weight: (!) 249 lb (112.9 kg)    Body mass index is 37.86 kg/m .    General appearance: alert, appears stated age and cooperative  Head: Normocephalic, without obvious abnormality, atraumatic  Lungs: clear to auscultation bilaterally  Heart: regular rate and rhythm, S1, S2 normal, no murmur, click, rub or gallop  Abdomen: mild suprapubic tenderness, otherwise normal exam  MSK: no CVA tenderness    Recent Results (from the past 24 hour(s))   Urinalysis-UC if Indicated    Collection Time: 07/02/20 11:33 AM   Result Value Ref Range    Color, UA Yellow Colorless, Yellow, Straw, Light Yellow    Clarity, UA Cloudy (!) Clear    Glucose, UA Negative Negative    Bilirubin, UA Negative Negative    Ketones, UA Negative Negative    Specific Gravity, UA 1.020 1.005 - 1.030    Blood, UA Moderate (!) Negative    pH, UA 7.0 5.0 - 8.0    Protein, UA Trace (!) Negative mg/dL    Urobilinogen, UA 0.2 E.U./dL 0.2 E.U./dL, 1.0 E.U./dL    Nitrite, UA Negative Negative    Leukocytes, UA Large (!) Negative    Bacteria, UA Few (!) None Seen hpf    RBC, UA 25-50 (!) None Seen, 0-2 hpf    WBC, UA 10-25 (!) None Seen, 0-5 hpf    Squam Epithel, UA 0-5 None Seen, 0-5 lpf     WBC Clumps Present (!) None Seen         This note has been dictated using voice recognition software. Any grammatical or context distortions are unintentional and inherent to the the software.

## 2021-06-09 NOTE — TELEPHONE ENCOUNTER
Burning with urination started yesterday. Today she has some blood in the urine. I connected with scheduling for an appointment today.  In person appointment at [facility] @ 11:20 a.m. with Dr Gamino.  Gabriella Schofield RN  [facility]      Reason for Disposition    Age > 50 years    Additional Information    Negative: Shock suspected (e.g., cold/pale/clammy skin, too weak to stand, low BP, rapid pulse)    Negative: Sounds like a life-threatening emergency to the triager    Negative: Unable to urinate (or only a few drops) and bladder feels very full    Negative: Vomiting    Negative: Patient sounds very sick or weak to the triager    Negative: Severe pain with urination     Pain at burining 4    Negative: Fever > 100.5 F (38.1 C)    Negative: Side (flank) or lower back pain present    Negative: Taking antibiotic > 24 hours for UTI and fever persists    Negative: Taking antibiotic > 3 days for UTI and painful urination not improved    Negative: Unusual vaginal discharge    Negative: > 2 UTIs in last year    Negative: Patient is worried about sexually transmitted disease (STD)    Protocols used: URINATION PAIN - FEMALE-A-OH

## 2021-06-10 NOTE — PROGRESS NOTES
Assessment and Plan:     Clarisse was seen today for annual wellness visit.    Encounter for general adult medical examination with abnormal findings    Hypertension  -     Comprehensive Metabolic Panel    Major depressive disorder with single episode, in partial remission (H)    Dyslipidemia  -     Comprehensive Metabolic Panel  -     Lipid Cascade    Microscopic hematuria  -     Urinalysis-UC if Indicated    Other orders  -     Pneumococcal polysaccharide vaccine 23-valent 1 yo or older, subq/IM  -     Td, Preservative Free (green label)        The patient's current medical problems were reviewed.    The following high BMI interventions were performed this visit: encouragement to exercise  The following health maintenance schedule was reviewed with the patient and provided in printed form in the after visit summary:   Health Maintenance   Topic Date Due     DEPRESSION ACTION PLAN  1955     HEPATITIS C SCREENING  1955     ZOSTER VACCINES (2 of 3) 10/16/2013     DXA SCAN  03/11/2018     INFLUENZA VACCINE RULE BASED (1) 08/01/2020     MEDICARE ANNUAL WELLNESS VISIT  08/13/2020     PNEUMOCOCCAL IMMUNIZATION 65+ LOW/MEDIUM RISK (2 of 2 - PPSV23) 08/13/2020     TD 18+ HE  10/15/2020     FALL RISK ASSESSMENT  07/02/2021     MAMMOGRAM  08/27/2021     LIPID  08/13/2024     ADVANCE CARE PLANNING  08/13/2024     COLORECTAL CANCER SCREENING  11/12/2029     HEPATITIS B VACCINES  Aged Out        Subjective:   Chief Complaint: Clarisse Romero is an 65 y.o. female here for an Annual Wellness visit.   HPI: She has hypertension is on atenolol and chlorthalidone.  Blood pressure is quite reasonable without any side effect concerns.  Discussed lab test monitoring.  She is chronic rhinitis on nasal steroid.  Had mammogram done last year.  Overall relatively low risk discussed option to have every 2-year mammography and this is what she would like to do.  She is a family history of colon cancer, she had a colonoscopy performed  this past November.  She is due for follow-up colonoscopy in 5 years.  She was given information about treatment of hemorrhoids and plans to pursue this.  Today we discussed immunizations and other routine health prevention.  She does have a history of urinary tract infections, she often has hematuria associated with this.  We discussed the importance of follow-up urine testing to make sure that there are not other more significant considerations causing hematuria.  She has a history of depression in remission and remains on citalopram.    Review of Systems:   Please see above.  The rest of the review of systems are negative for all systems.    Patient Care Team:  Catarino Nicholson MD as PCP - General  Catarino Nicholson MD as Assigned PCP     Patient Active Problem List   Diagnosis     Major depression in partial remission (H)     Migraine Headache     Vaginal Discharge     Pain During Urination (Dysuria)     Herpes Zoster (Shingles)     Joint Pain, Localized In The Knee     Sprained Right Knee     Closed Fracture Of The Right Lateral Tibial Plateau     Contusion With Intact Skin Surface Of The Right Great Toenail     Hypertension     Acute Sinusitis     Skin Neoplasm Of Uncertain Behavior     Sinusitis     Benign neoplasm of descending colon     Diverticular disease of large intestine     Polyp of colon     No past medical history on file.   Past Surgical History:   Procedure Laterality Date     CARPAL TUNNEL RELEASE  06/2016     TRIGGER FINGER RELEASE  06/2016      Family History   Problem Relation Age of Onset     Breast cancer Maternal Aunt       Social History     Socioeconomic History     Marital status:      Spouse name: Not on file     Number of children: Not on file     Years of education: Not on file     Highest education level: Not on file   Occupational History     Not on file   Social Needs     Financial resource strain: Not on file     Food insecurity     Worry: Not on file     Inability: Not  "on file     Transportation needs     Medical: Not on file     Non-medical: Not on file   Tobacco Use     Smoking status: Never Smoker     Smokeless tobacco: Never Used   Substance and Sexual Activity     Alcohol use: Not on file     Drug use: Not on file     Sexual activity: Not on file   Lifestyle     Physical activity     Days per week: Not on file     Minutes per session: Not on file     Stress: Not on file   Relationships     Social connections     Talks on phone: Not on file     Gets together: Not on file     Attends Advent service: Not on file     Active member of club or organization: Not on file     Attends meetings of clubs or organizations: Not on file     Relationship status: Not on file     Intimate partner violence     Fear of current or ex partner: Not on file     Emotionally abused: Not on file     Physically abused: Not on file     Forced sexual activity: Not on file   Other Topics Concern     Not on file   Social History Narrative     Not on file      Current Outpatient Medications   Medication Sig Dispense Refill     atenoloL (TENORMIN) 50 MG tablet Take 1 tablet by mouth once daily. 90 tablet 1     chlorthalidone (HYGROTEN) 25 MG tablet Take 1 tablet by mouth once daily. 90 tablet 1     citalopram (CELEXA) 20 MG tablet Take 1 tablet (20 mg total) by mouth daily. 90 tablet 3     fluticasone propionate (FLONASE) 50 mcg/actuation nasal spray Spray 2 sprays in each nostril once daily. 16 g 11     albuterol (PROAIR HFA;PROVENTIL HFA;VENTOLIN HFA) 90 mcg/actuation inhaler Inhale 2 puffs every 6 (six) hours as needed for wheezing. 1 each 3     mometasone (NASONEX) 50 mcg/actuation nasal spray SHAKE LIQUID AND USE 1 SPRAY IN EACH NOSTRIL TWICE DAILY 51 g 3     No current facility-administered medications for this visit.       Objective:   Vital Signs:   Visit Vitals  /76   Pulse 85   Ht 5' 8\" (1.727 m)   Wt (!) 244 lb (110.7 kg)   SpO2 96%   BMI 37.10 kg/m           VisionScreening:  No exam data " present     PHYSICAL EXAM      General Appearance:    Alert, cooperative, no distress   Eyes:   No scleral icterus or conjunctival irritation       Ears:    Normal TM's and external ear canals, both ears   Throat:   Lips, mucosa, and tongue normal; teeth and gums normal   Neck:   Supple, symmetrical, trachea midline, no adenopathy;        thyroid:  No enlargement/tenderness/nodules   Lungs:     Clear to auscultation bilaterally, respirations unlabored, no wheezes or crackles   Heart:    Regular rate and rhythm,  No murmur   Abdomen:    Soft, no distention, no tenderness on palpation, no masses, no organomegaly     Extremities:  No edema, no joint swelling or redness, no evidence of any injuries   Skin:  No concerning skin findings, no suspicious moles, no rashes   Neurologic:  On gross examination there is no motor or sensory deficit.  Patient walks with a normal gait     Assessment Results 8/17/2020   Activities of Daily Living No help needed   Instrumental Activities of Daily Living No help needed   Mini Cog Total Score 5   Some recent data might be hidden     A Mini-Cog score of 0-2 suggests the possibility of dementia, score of 3-5 suggests no dementia    Identified Health Risks:     She is at risk for lack of exercise and has been provided with information to increase physical activity for the benefit of her well-being.  Information on urinary incontinence and treatment options given to patient.  Patient's advanced directive was discussed and I am comfortable with the patient's wishes.

## 2021-06-10 NOTE — PROGRESS NOTES
ASSESSMENT/PLAN:   1. Acute bacterial sinusitis  amoxicillin-clavulanate (AUGMENTIN) 875-125 mg per tablet   2. Post-viral cough syndrome  albuterol nebulizer solution 3 mL (PROVENTIL)    Bordetella pertussis PCR    benzonatate (TESSALON PERLES) 100 MG capsule       No findings on lung exam to suggest pneumonia-lungs were clear likely clear.  There was no wheezing either.  We did attempt an albuterol nebulizer treatment in the clinic today to see if it would calm down her cough.  Upon reassessment, patient did not feel any better and lung exam was unchanged. Less likely a bronchospastic cough with this lack of response to albuterol.    With clear lungs and history that strongly suggests infectious/post-infectious etiology of cough, I do not think imaging is needed today. Nothing on history to suggest heart failure, malignancy, effusion, or pneumothorax. Nothing to suggest PE on history or exam, either.    Given severity and persistence of cough for 3 weeks, I did test her for pertussis.  However, I have low suspicion and do not think empiric treatment or isolation is indicated today. We will follow up on result and add on azithromycin if needed when result returns.    Most likely cause for cough is postnasal drip from sinusitis, given her significant sinus symptoms as well.  Sinus symptoms have been ongoing for 3 weeks, thus likely do represent bacterial infection at this point.  I will treat with Augmentin.    At the end of the encounter, I discussed results, diagnosis, medications. Discussed red flags for immediate return to clinic/ER, as well as indications for follow up if no improvement. Patient understood and agreed to plan. Patient was stable for discharge.      *I was masked during all patient interactions. Patient was also masked from time of registration.    Patient Instructions:  Patient Instructions   Your cough is most likely due to post-nasal drip from the sinus infection. Lungs were clear- no signs of  "pneumonia, and no wheezing.    We will call with results of whooping cough (pertussis) test in 2-3 days, and will treat if needed.    We will treat the sinus infection with Augmentin.    Take Augmentin twice daily with food. Take a probiotic while on the antibiotic.    May use Tessalon Perles as needed for cough.    If persistent cough in 2 weeks, see your regular doctor.     If developing worsening shortness of breath, fever, coughing up blood, or chest pain, or any other new concerning symptoms, go to the ER right away.                      SUBJECTIVE:   Clarisse Romero is a 62 y.o. female who presents today for evaluation of cough for 3 weeks. She says it is not getting worse, just not getting better. She describes cough as non-productive and dry, \"nonstop hacking,\" worse when laying down.  Occasionally in the morning she does produce a bit of mucous. She has had nasal congestion and frontal sinus pressure and headache, which has also been persistent for 3 weeks. She admits to post-nasal drip, sore throat, muffled hearing. She also admits to fatigue x 2 weeks, taking 2 naps per day.  No fevers. No body aches. No post-tussive emesis. She denies shortness of breath. No chest pain or hemoptysis. No leg swelling. No nausea or vomiting. No recent travel. No history of chronic lung disease. No recent surgeries.  She has been taking OTC cough suppressant and decongestant which helps somewhat.      Past Medical History:  Patient Active Problem List   Diagnosis     Depression     Migraine Headache     Vaginal Discharge     Pain During Urination (Dysuria)     Herpes Zoster (Shingles)     Joint Pain, Localized In The Knee     Sprained Right Knee     Closed Fracture Of The Right Lateral Tibial Plateau     Contusion With Intact Skin Surface Of The Right Great Toenail     Hypertension     Acute Sinusitis     Skin Neoplasm Of Uncertain Behavior     Sinusitis       Surgical History:  Reviewed; Non-contributory      Family " History:  Family History   Problem Relation Age of Onset     Breast cancer Maternal Aunt      Reviewed; Non-contributory      Social History:    History   Smoking Status     Never Smoker   Smokeless Tobacco     Never Used     Smoking: none  Alcohol use: very rarely- one monthly  Other drug use: none  Occupation: retired        Current Medications:  Current Outpatient Prescriptions on File Prior to Visit   Medication Sig Dispense Refill     atenolol-chlorthalidone (TENORETIC) 50-25 mg per tablet Take 1 tablet by mouth daily. 90 tablet 4     citalopram (CELEXA) 20 MG tablet Take 1 tablet (20 mg total) by mouth daily. 90 tablet 4     mometasone (NASONEX) 50 mcg/actuation nasal spray SHAKE WELL AND USE 1 SPRAY IN EACH NOSTRIL TWICE DAILY 17 g 3     calcium carbonate (OS-ALYSSIA) 600 mg (1,500 mg) tablet Take 600 mg by mouth 2 (two) times a day with meals.       [DISCONTINUED] atenolol-chlorthalidone (TENORETIC) 50-25 mg per tablet TAKE ONE TABLET BY MOUTH DAILY 90 tablet 0     No current facility-administered medications on file prior to visit.        Allergies:   No Known Allergies    I personally reviewed patient's past medical, surgical, social, family history and allergies.    ROS:  Review of Systems  A 10 point review of systems was conducted and otherwise negative unless noted in HPI.        OBJECTIVE:   /80 (Patient Site: Right Arm, Patient Position: Sitting, Cuff Size: Adult Large)  Pulse 80  Temp 97.9  F (36.6  C) (Oral)   Resp 22  SpO2 97% Comment: room air  Breastfeeding? No      General Appearance:  Alert, generally well-appearing older female in NAD. Afebrile.  Integument: Warm, dry.  HEENT:  Head: Atraumatic, normocephalic. Face nontraumatic.  Eyes: Conjunctiva clear, Lids normal.  Ears:  TMs pearly, translucent bilaterally. No canal erythema or edema.  Nose: nares patent. Mild erythema of nasal mucosa. No rhinorrhea. + frontal sinus tenderness to percussion. No maxillary sinus  tenderness.  Oropharynx:  No trismus. + posterior pharyngeal erythema. No petechiae, no exudate. No tonsillar hypertrophy. Uvula midline. Moist mucus membranes.  Neck: Supple, no lymphadenopathy. No meningismus.  Respiratory:  Frequent cough, but speaks in full sentences and no distress. Equal inspiration to bilateral lung bases. Lungs clear to ausculation bilaterally. No crackles, wheezes, rhonchi or stridor.  Cardiovascular: Regular rate and rhythm, no murmur, rub or gallop. No obvious chest wall deformities. No peripheral edema.  Neurologic: Alert and orientated appropriately. No focal deficits.

## 2021-06-10 NOTE — PROGRESS NOTES
Assessment:     Clarisse was seen today for cough.    Diagnoses and all orders for this visit:    Bronchitis  -     azithromycin (ZITHROMAX) 250 MG tablet; Take 2 tablets on day 1, then 1 tablet for 4 days.  -     albuterol (PROAIR HFA;PROVENTIL HFA;VENTOLIN HFA) 90 mcg/actuation inhaler; Inhale 2 puffs every 6 (six) hours as needed for wheezing.        Plan:     1. Bronchitis  Do a round of antibiotics with Zithromax.  This would cover atypical organisms and may have some benefit over Augmentin.  We will also start albuterol inhaler because of the apparent bronchospasm troubles she is facing.  Instructions on inhaler use given  - azithromycin (ZITHROMAX) 250 MG tablet; Take 2 tablets on day 1, then 1 tablet for 4 days.  Dispense: 6 tablet; Refill: 0  - albuterol (PROAIR HFA;PROVENTIL HFA;VENTOLIN HFA) 90 mcg/actuation inhaler; Inhale 2 puffs every 6 (six) hours as needed for wheezing.  Dispense: 1 each; Refill: 3      This is a 25 minute visit with greater than 50% of the time spent counseling regarding instructions for inhaler use reviewed with patient      Subjective:      Vasu is a 62 y.o. female presenting to my clinic for evaluation of persistent cough.  Patient just finished a 10 day course of Augmentin.  She said she has decreased secretions and purulent drainage but still persistent coughing.  She her cough bothers her at night and she just feels she is not quite better yet.  She has never used an inhaler in the past nor has she had ever required steroids for cough.    Patient is not a smoker.  Options to treat at this point reviewed including antibiotics inhaler and steroids..        Current Outpatient Prescriptions on File Prior to Visit   Medication Sig Dispense Refill     atenolol-chlorthalidone (TENORETIC) 50-25 mg per tablet Take 1 tablet by mouth daily. 90 tablet 4     benzonatate (TESSALON PERLES) 100 MG capsule Take 1 capsule (100 mg total) by mouth every 6 (six) hours as needed for cough. 30  capsule 0     citalopram (CELEXA) 20 MG tablet Take 1 tablet (20 mg total) by mouth daily. 90 tablet 4     mometasone (NASONEX) 50 mcg/actuation nasal spray SHAKE WELL AND USE 1 SPRAY IN EACH NOSTRIL TWICE DAILY 17 g 3     calcium carbonate (OS-ALYSSIA) 600 mg (1,500 mg) tablet Take 600 mg by mouth 2 (two) times a day with meals.       No current facility-administered medications on file prior to visit.      No Known Allergies  History reviewed. No pertinent past medical history.  Past Surgical History:   Procedure Laterality Date     CARPAL TUNNEL RELEASE  06/2016     TRIGGER FINGER RELEASE  06/2016     Social History     Social History     Marital status:      Spouse name: N/A     Number of children: N/A     Years of education: N/A     Occupational History     Not on file.     Social History Main Topics     Smoking status: Never Smoker     Smokeless tobacco: Never Used     Alcohol use Not on file     Drug use: Not on file     Sexual activity: Not on file     Other Topics Concern     Not on file     Social History Narrative     Family History   Problem Relation Age of Onset     Breast cancer Maternal Aunt        ROS:  I have performed a 10 point ROS.  All pertinent positives and negatives are found in the HPI.  All others are negative.    No fever no chills, no rash, no nausea or vomiting    Objective:     Physical Exam:  /72 (Patient Site: Right Arm, Patient Position: Sitting, Cuff Size: Adult Large)  Pulse 64  Wt (!) 260 lb 4.8 oz (118.1 kg)  BMI 40.17 kg/m2  General Appearance: Alert, cooperative, no distress, appears stated age  Head: Normocephalic, without obvious abnormality, atraumatic  Eyes: PERRL, conjunctiva/corneas clear, EOM's intact  Ears: Normal TM's and external ear canals, both ears  Nose: Nares normal, septum midline,mucosa normal, persistent cloudy pharyngeal drainage  Throat: Lips, mucosa, and tongue normal; teeth and gums normal  Lungs: Clear to auscultation bilaterally, respirations  unlabored-no wheezing appreciated  Heart: Regular rate and rhythm, S1 and S2 normal, no murmur, rub, or gallop,     Extremities: Extremities normal, atraumatic, no cyanosis or edema  Skin: Skin color, texture, turgor normal, no rashes or lesions  Lymph nodes: Cervical adenopathy in anterior chain/, supraclavicular, and axillary nodes normal

## 2021-06-10 NOTE — TELEPHONE ENCOUNTER
Refill Approved    Rx renewed per Medication Renewal Policy. Medication was last renewed on 2/3/20.10/1/19.    Magali Trimble, [facility] Triage/Med Refill 8/26/2020     Requested Prescriptions   Pending Prescriptions Disp Refills     atenoloL (TENORMIN) 50 MG tablet [Pharmacy Med Name: Atenolol Oral Tablet 50 MG] 90 tablet 0     Sig: TAKE ONE TABLET BY MOUTH ONE TIME DAILY       Beta-Blockers Refill Protocol Passed - 8/24/2020 11:50 AM        Passed - PCP or prescribing provider visit in past 12 months or next 3 months     Last office visit with prescriber/PCP: 10/3/2017 Catarino Nicholson MD OR same dept: 7/2/2020 Marcelle Gamino MD OR same specialty: 7/2/2020 Marcelle Gamino MD  Last physical: 8/17/2020 Last MTM visit: Visit date not found   Next visit within 3 mo: Visit date not found  Next physical within 3 mo: Visit date not found  Prescriber OR PCP: Catarino Nicholson MD  Last diagnosis associated with med order: 1. Hypertension  - atenoloL (TENORMIN) 50 MG tablet [Pharmacy Med Name: Atenolol Oral Tablet 50 MG]; TAKE ONE TABLET BY MOUTH ONE TIME DAILY   Dispense: 90 tablet; Refill: 0  - chlorthalidone (HYGROTEN) 25 MG tablet [Pharmacy Med Name: Chlorthalidone Oral Tablet 25 MG]; TAKE ONE TABLET BY MOUTH ONE TIME DAILY   Dispense: 90 tablet; Refill: 0    2. Depression  - citalopram (CELEXA) 20 MG tablet [Pharmacy Med Name: Citalopram Hydrobromide Oral Tablet 20 MG]; Take 1 tablet (20 mg total) by mouth daily.  Dispense: 90 tablet; Refill: 0    If protocol passes may refill for 12 months if within 3 months of last provider visit (or a total of 15 months).             Passed - Blood pressure filed in past 12 months     BP Readings from Last 1 Encounters:   08/17/20 126/76                chlorthalidone (HYGROTEN) 25 MG tablet [Pharmacy Med Name: Chlorthalidone Oral Tablet 25 MG] 90 tablet 0     Sig: TAKE ONE TABLET BY MOUTH ONE TIME DAILY       Diuretics/Combination Diuretics Refill Protocol  Passed - 8/24/2020  11:50 AM        Passed - Visit with PCP or prescribing provider visit in past 12 months     Last office visit with prescriber/PCP: 10/3/2017 Catarino Nicholson MD OR same dept: 7/2/2020 Marcelle Gamino MD OR same specialty: 7/2/2020 Marcelle Gamino MD  Last physical: 8/17/2020 Last MTM visit: Visit date not found   Next visit within 3 mo: Visit date not found  Next physical within 3 mo: Visit date not found  Prescriber OR PCP: Catarino Nicholson MD  Last diagnosis associated with med order: 1. Hypertension  - atenoloL (TENORMIN) 50 MG tablet [Pharmacy Med Name: Atenolol Oral Tablet 50 MG]; TAKE ONE TABLET BY MOUTH ONE TIME DAILY   Dispense: 90 tablet; Refill: 0  - chlorthalidone (HYGROTEN) 25 MG tablet [Pharmacy Med Name: Chlorthalidone Oral Tablet 25 MG]; TAKE ONE TABLET BY MOUTH ONE TIME DAILY   Dispense: 90 tablet; Refill: 0    2. Depression  - citalopram (CELEXA) 20 MG tablet [Pharmacy Med Name: Citalopram Hydrobromide Oral Tablet 20 MG]; Take 1 tablet (20 mg total) by mouth daily.  Dispense: 90 tablet; Refill: 0    If protocol passes may refill for 12 months if within 3 months of last provider visit (or a total of 15 months).             Passed - Serum Potassium in past 12 months      Lab Results   Component Value Date    Potassium 4.2 08/17/2020             Passed - Serum Sodium in past 12 months      Lab Results   Component Value Date    Sodium 141 08/17/2020             Passed - Blood pressure on file in past 12 months     BP Readings from Last 1 Encounters:   08/17/20 126/76             Passed - Serum Creatinine in past 12 months      Creatinine   Date Value Ref Range Status   08/17/2020 0.78 0.60 - 1.10 mg/dL Final                citalopram (CELEXA) 20 MG tablet [Pharmacy Med Name: Citalopram Hydrobromide Oral Tablet 20 MG] 90 tablet 0     Sig: Take 1 tablet (20 mg total) by mouth daily.       SSRI Refill Protocol  Passed - 8/24/2020 11:50 AM        Passed - PCP or prescribing provider visit in last year      Last office visit with prescriber/PCP: 10/3/2017 Catarino Nicholson MD OR same dept: 7/2/2020 Marcelle Gamino MD OR same specialty: 7/2/2020 Marcelle Gamino MD  Last physical: 8/17/2020 Last MTM visit: Visit date not found   Next visit within 3 mo: Visit date not found  Next physical within 3 mo: Visit date not found  Prescriber OR PCP: Catarino Nicholson MD  Last diagnosis associated with med order: 1. Hypertension  - atenoloL (TENORMIN) 50 MG tablet [Pharmacy Med Name: Atenolol Oral Tablet 50 MG]; TAKE ONE TABLET BY MOUTH ONE TIME DAILY   Dispense: 90 tablet; Refill: 0  - chlorthalidone (HYGROTEN) 25 MG tablet [Pharmacy Med Name: Chlorthalidone Oral Tablet 25 MG]; TAKE ONE TABLET BY MOUTH ONE TIME DAILY   Dispense: 90 tablet; Refill: 0    2. Depression  - citalopram (CELEXA) 20 MG tablet [Pharmacy Med Name: Citalopram Hydrobromide Oral Tablet 20 MG]; Take 1 tablet (20 mg total) by mouth daily.  Dispense: 90 tablet; Refill: 0    If protocol passes may refill for 12 months if within 3 months of last provider visit (or a total of 15 months).

## 2021-06-12 NOTE — TELEPHONE ENCOUNTER
Refill Approved    Rx renewed per Medication Renewal Policy. Medication was last renewed on 11/4/19.    Magali Trimble, [facility] Triage/Med Refill 11/3/2020     Requested Prescriptions   Pending Prescriptions Disp Refills     fluticasone propionate (FLONASE) 50 mcg/actuation nasal spray [Pharmacy Med Name: Fluticasone Propionate Nasal Suspension 50 MCG/ACT] 16 g 0     Sig: Spray 2 sprays in each nostril once daily.       Nasal Steroid Refill Protocol Passed - 10/30/2020  2:00 AM        Passed - Patient has had office visit/physical in last 2 years     Last office visit with prescriber/PCP: Visit date not found OR same dept: 7/2/2020 Marcelle Gamino MD OR same specialty: 7/2/2020 Marcelle Gamino MD Last physical: 8/17/2020 Last MTM visit: Visit date not found    Next appt within 3 mo: Visit date not found  Next physical within 3 mo: Visit date not found  Prescriber OR PCP: Catarino Nicholson MD  Last diagnosis associated with med order: 1. Sinusitis  - fluticasone propionate (FLONASE) 50 mcg/actuation nasal spray [Pharmacy Med Name: Fluticasone Propionate Nasal Suspension 50 MCG/ACT]; Spray 2 sprays in each nostril once daily.  Dispense: 16 g; Refill: 0     If protocol passes may refill for 12 months if within 3 months of last provider visit (or a total of 15 months).

## 2021-06-13 NOTE — PROGRESS NOTES
Patient ID: Clarisse Romero is a 62 y.o. female.  /78  Pulse 60  Wt (!) 260 lb (117.9 kg)  SpO2 96%  BMI 40.12 kg/m2    Assessment/Plan:                   Diagnoses and all orders for this visit:    Hypertension  -     Basic Metabolic Panel    Rhinitis  -     mometasone (NASONEX) 50 mcg/actuation nasal spray; SHAKE LIQUID AND USE 1 SPRAY IN EACH NOSTRIL TWICE DAILY  Dispense: 51 g; Refill: 3    Major depressive disorder with single episode, in partial remission    Foot pain    Other orders  -     Influenza, Seasonal Quad, Preservative Free 36+ Months          DISCUSSION  Continue current antihypertensive therapy check labs as above.    Continue to use Nasonex nasal spray for management of nasal allergy symptoms.  Continue to use albuterol if wheezing occurs associated with congestion or respiratory infections.    Prescription provided for orthotics for chronic foot pain likely secondary to plantar fasciitis.    2 new current dose of citalopram.  No indication for dose change.  PHQ 9 score is 1.5.    Shot today.  Subjective:     HPI    Clarisse Romero is a 62-year-old female who has hypertension.  Blood pressure is well controlled.Blood pressure is currently controlled with atenolol chlorthalidone. She is due for labs.  She has issues with rhinitis and has used Nasonex.  This does work well for symptom control.  Discussed refill of medication.  With previous respiratory infection she has benefited from use of an albuterol inhaler.  Discussed intermittent use as needed for wheezing symptoms associated with respiratory infections.  She does not have any other symptoms at any other time.  Requesting orthotics for chronic foot pain and plantar fasciitis.  She states she has a service that she can use of her prescription is provided.  Patient has several other routine health questions which are discussed today.    We also discussed depression symptoms.  Patient states she has a lot of stress in her life.  She is  taking citalopram 20 mg.  She feels the stress at times can be overwhelming but for the most part she is managing well.  We discussed potential considerations regarding changing medication or medication dose to try and improve symptom control she did not feel this was necessary based on our conversation it does not seem necessary.  Review of Systems  Complete review of systems is obtained.  Other than the specific considerations noted above complete review of systems is negative.        Objective:   Medications:  Current Outpatient Prescriptions   Medication Sig     atenolol-chlorthalidone (TENORETIC) 50-25 mg per tablet Take 1 tablet by mouth daily.     calcium carbonate (OS-ALYSSIA) 600 mg (1,500 mg) tablet Take 600 mg by mouth 2 (two) times a day with meals.     citalopram (CELEXA) 20 MG tablet TAKE 1 TABLET BY MOUTH DAILY.     mometasone (NASONEX) 50 mcg/actuation nasal spray SHAKE LIQUID AND USE 1 SPRAY IN EACH NOSTRIL TWICE DAILY     albuterol (PROAIR HFA;PROVENTIL HFA;VENTOLIN HFA) 90 mcg/actuation inhaler Inhale 2 puffs every 6 (six) hours as needed for wheezing.     atenolol-chlorthalidone (TENORETIC) 50-25 mg per tablet TAKE ONE TABLET BY MOUTH DAILY     benzonatate (TESSALON PERLES) 100 MG capsule Take 1 capsule (100 mg total) by mouth every 6 (six) hours as needed for cough.     Allergies:  No Known Allergies    Tobacco:   reports that she has never smoked. She has never used smokeless tobacco.     Physical Exam      /78  Pulse 60  Wt (!) 260 lb (117.9 kg)  SpO2 96%  BMI 40.12 kg/m2        General Appearance:    Alert, cooperative, no distress   Eyes:    No conjunctival irritation, no scleral icterus       Lungs:     Clear to auscultation bilaterally, respirations unlabored   Heart:    Regular rate and rhythm, no murmur, rub   or gallop   Extremities:   Extremities normal, atraumatic, no cyanosis or edema   Skin:   Skin color, texture, turgor normal, no rashes or lesions   Neurologic:   Normal  strength and sensation

## 2021-06-16 PROBLEM — K57.30 DIVERTICULAR DISEASE OF LARGE INTESTINE: Status: ACTIVE | Noted: 2019-11-12

## 2021-06-16 PROBLEM — K63.5 POLYP OF COLON: Status: ACTIVE | Noted: 2019-11-12

## 2021-06-16 PROBLEM — D12.4 BENIGN NEOPLASM OF DESCENDING COLON: Status: ACTIVE | Noted: 2019-11-14

## 2021-06-16 NOTE — PROGRESS NOTES
Assessment/Plan:     Patient complains of a tIny area of scaling skin near right eye with a suspected hair follicle that appears to be uninflamed.  This dry eyes skin is a little unusual, but does not appear inflamed or irritated.  Previously anti-itch cream over-the-counter had been sufficient for patient's symptoms.  This time I suspect there is a component of a hair follicle where an eyelash actually fell out and then the area became a possible source for the patient's irritation.  It is unclear.  Discussed with the patient that we could consider a steroid cream, and topical antifungal cream, or topical antibiotic cream.  We do not know the source of the irritation, but an antibiotic seems to be a benign option.  Recommended this is the first line treatment for 1 week.  Patient has any symptoms involving her actual eye, she is needs to present immediately to the emergency department.  Patient is understanding of this.  If this is insufficient in 1 week, could consider a steroid or antifungal.  Patient will discuss this with me via my chart in the coming days.    Additionally patient explains that she has had incontinence for several months.  She also endorses some urgency.  Will obtain a routine urinalysis with the understanding that she may need a referral to urology.    Problem List Items Addressed This Visit     None      Visit Diagnoses     Rash    -  Primary    Incontinence        Relevant Orders    Urinalysis-UC if Indicated          Total time spent with patient was 15 minutes with greater than 50% spent in face-to-face counseling regarding the above plan.    This note has been dictated using voice recognition software. Any grammatical or context distortions are unintentional and inherent to the the software.     Lizzette Bravo MD  Family [facility]      Subjective:      Clarisse Romero is a 62 y.o. female who presents to clinic for skin irritation near the right eye.    Patient has  "experienced this essentially every year for the past few years.  However this episode is different.  Previously she is an itch cream to good effect.    This year it began near the medial right eye, almost immediately after having her eyes dilated at the ophthalmologists approximately 2 weeks ago.  3 days later the area around the eye became dry after using a new soap in a different house she was visiting.  She treated it with a hot pack but noticed that the 2 very small areas \"came to ahead\" and then began \"weeping creamy discharge\".  Since that time and has stopped weeping, she has not applied heat or any anti-itch cream.  It has not affected her eye, she has no changes in vision, no blurriness, no eye irritation, and there is no pain associated with this.  When she touches the area she finds that it burns slightly, but it does not seem to itch as much as previous years.      Past Medical History, Family History, and Social History reviewed.     Review of systems is as stated in HPI.  Patient endorses: incontinence and eye rash  The remainder of the 10 system review is otherwise negative.    Objective:     /90  Pulse 66  Temp 98.9  F (37.2  C)  Ht 5' 7.5\" (1.715 m)  Wt (!) 262 lb (118.8 kg)  SpO2 96%  BMI 40.43 kg/m2 Body mass index is 40.43 kg/(m^2).    Gen: Alert, NAD, appears stated age, normal hygiene   Eyes: conjunctivae without injection, sclera clear, EOMI; no eye concern  SKIN: At the medial right eye corner there is a subcentimeter area of scaling skin and a submillimeter papule essentially in line with other hair follicles        Current Outpatient Prescriptions on File Prior to Visit   Medication Sig Dispense Refill     albuterol (PROAIR HFA;PROVENTIL HFA;VENTOLIN HFA) 90 mcg/actuation inhaler Inhale 2 puffs every 6 (six) hours as needed for wheezing. 1 each 3     atenolol-chlorthalidone (TENORETIC) 50-25 mg per tablet Take 1 tablet by mouth daily. 90 tablet 4     atenolol-chlorthalidone " (TENORETIC) 50-25 mg per tablet TAKE ONE TABLET BY MOUTH DAILY 90 tablet 3     benzonatate (TESSALON PERLES) 100 MG capsule Take 1 capsule (100 mg total) by mouth every 6 (six) hours as needed for cough. 30 capsule 0     calcium carbonate (OS-ALYSSIA) 600 mg (1,500 mg) tablet Take 600 mg by mouth 2 (two) times a day with meals.       citalopram (CELEXA) 20 MG tablet TAKE 1 TABLET BY MOUTH DAILY. 90 tablet 3     mometasone (NASONEX) 50 mcg/actuation nasal spray SHAKE LIQUID AND USE 1 SPRAY IN EACH NOSTRIL TWICE DAILY 51 g 3     No current facility-administered medications on file prior to visit.

## 2021-06-17 NOTE — PATIENT INSTRUCTIONS - HE
Patient Instructions by Catarino Nicholson MD at 8/13/2019  8:00 AM     Author: Catarino Nicholson MD Service: -- Author Type: Physician    Filed: 8/13/2019  8:14 AM Encounter Date: 8/13/2019 Status: Signed    : Catarino Nicholson MD (Physician)         Patient Education     Exercise for a Healthier Heart  You may wonder how you can improve the health of your heart. If youre thinking about exercise, youre on the right track. You dont need to become an athlete, but you do need a certain amount of brisk exercise to help strengthen your heart. If you have been diagnosed with a heart condition, your doctor may recommend exercise to help stabilize your condition. To help make exercise a habit, choose safe, fun activities.       Be sure to check with your health care provider before starting an exercise program.    Why exercise?  Exercising regularly offers many healthy rewards. It can help you do all of the following:    Improve your blood cholesterol levels to help prevent further heart trouble    Lower your blood pressure to help prevent a stroke or heart attack    Control diabetes, or reduce your risk of getting this disease    Improve your heart and lung function    Reach and maintain a healthy weight    Make your muscles stronger and more limber so you can stay active    Prevent falls and fractures by slowing the loss of bone mass (osteoporosis)    Manage stress better  Exercise tips  Ease into your routine. Set small goals. Then build on them.  Exercise on most days. Aim for a total of 150 or more minutes of moderate to  vigorous intensity activity each week. Consider 40 minutes, 3 to 4 times a week. For best results, activity should last for 40 minutes on average. It is OK to work up to the 40 minute period over time. Examples of moderate-intensity activity is walking one mile in 15 minutes or 30 to 45 minutes of yard work.  Step up your daily activity level. Along with your exercise program, try being more  active throughout the day. Walk instead of drive. Do more household tasks or yard work.  Choose one or more activities you enjoy. Walking is one of the easiest things you can do. You can also try swimming, riding a bike, or taking an exercise class.  Stop exercising and call your doctor if you:    Have chest pain or feel dizzy or lightheaded    Feel burning, tightness, pressure, or heaviness in your chest, neck, shoulders, back, or arms    Have unusual shortness of breath    Have increased joint or muscle pain    Have palpitations or an irregular heartbeat      4883-2302 [employer]. [address] [place] [id number]. All rights reserved. This information is not intended as a substitute for professional medical care. Always follow your healthcare professional's instructions.         Patient Education   Urinary Incontinence, Female (Adult)  Urinary incontinence means loss of control of the bladder. This problem affects many women, especially as they get older. If you have incontinence, you may be embarrassed to ask for help. But know that this problem can be treated.  Types of Incontinence  There are different types of incontinence. Two of the main types are described here. You can have more than one type.    Stress incontinence. With this type, urine leaks when pressure (stress) is put on the bladder. This may happen when you cough, sneeze, or laugh. Stress incontinence most often occurs because the pelvic floor muscles that support the bladder and urethra are weak. This can happen after pregnancy and vaginal childbirth or a hysterectomy. It can also be due to excess body weight or hormone changes.    Urge incontinence (also called overactive bladder). With this type, a sudden urge to urinate is felt often. This may happen even though there may not be much urine in the bladder. The need to urinate often during the night is common. Urge incontinence most often occurs because of bladder  spasms. This may be due to bladder irritation or infection. Damage to bladder nerves or pelvic muscles, constipation, and certain medicines can also lead to urge incontinence.  Treatment of urinary incontinence depends on the cause. Further evaluation is needed to find the type you have. This will likely include an exam and certain tests. Based on the results, you and your healthcare provider can then plan treatment. Until a diagnosis is made, the home care tips below can help relieve symptoms.  Home care    Do pelvic floor muscle exercises, if they are prescribed. The pelvic floor muscles help support the bladder and urethra. Many women find that their symptoms improve when doing special exercises that strengthen these muscles. To do the exercises contract the muscles you would use to stop your stream of urine, but do this when youre not urinating. Hold for 10 seconds, then relax. Repeat 10 to 20 times in a row, at least 3 times a day. Your provider may give you other instructions for how to do the exercises and how often.    Keep a bladder diary. This helps track how often and how much you urinate over a set period of time. Bring this diary with you to your next visit with the provider. The information can help your provider learn more about your bladder problem.    Lose weight, if advised to by your provider. Excess weight puts pressure on the bladder. Your provider can help you create a weight-loss plan thats right for you. This may include exercising more and making certain diet changes.    Don't consume foods and drinks that may irritate the bladder. These can include alcohol and caffeinated drinks.    Quit smoking. Smoking and other tobacco use can lead to chronic cough that strains the pelvic floor muscles. Smoking may also damage the bladder and urethra. Talk with your provider about treatments or methods you can use to quit smoking.    If drinking large amounts of fluid causes you to have symptoms, you may  be advised to limit your fluid intake. You may also be advised to drink most of your fluids during the day and to limit fluids at night.    If youre worried about urine leakage or accidents, you may wear absorbent pads to catch urine. Change the pads often. This helps reduce discomfort. It may also reduce the risk of skin or bladder infections.  Follow-up care  Follow up with your healthcare provider, or as directed. It may take some to find the right treatment for your problem. Your treatment plan may include special therapies or medicines. Certain procedures or surgery may also be options. Be sure to discuss any questions you have with your provider.  When to seek medical advice  Call the healthcare provider right away if any of these occur:    Fever of 100.4 F (38 C) or higher, or as directed by your provider    Bladder pain or fullness    Abdominal swelling    Nausea or vomiting    Back pain    Weakness, dizziness or fainting  Date Last Reviewed: 10/1/2017    [id number] [employer]. [address] [place] [address]. All rights reserved. This information is not intended as a substitute for professional medical care. Always follow your healthcare professional's instructions.       Advance Directive  Patients advance directive was discussed and I am comfortable with the patients wishes.  Patient Education   Personalized Prevention Plan  You are due for the preventive services outlined below.  Your care team is available to assist you in scheduling these services.  If you have already completed any of these items, please share that information with your care team to update in your medical record.  Health Maintenance   Topic Date Due   ? HEPATITIS C SCREENING  1955   ? ADVANCE CARE PLANNING  03/11/1971   ? ZOSTER VACCINES (2 of 3) 10/16/2013   ? MEDICARE ANNUAL WELLNESS VISIT  03/11/2018   ? DXA SCAN  03/11/2018   ? PNEUMOCOCCAL POLYSACCHARIDE VACCINE AGE 65 AND OVER  03/11/2018   ?  PNEUMOCOCCAL CONJUGATE VACCINE FOR ADULTS (PCV13 OR PREVNAR)  03/11/2018   ? FALL RISK ASSESSMENT  03/11/2018   ? DEPRESSION FOLLOW UP  04/03/2018   ? MAMMOGRAM  04/13/2019   ? INFLUENZA VACCINE RULE BASED (1) 08/01/2019   ? TD 18+ HE  10/15/2020   ? COLONOSCOPY  06/23/2021

## 2021-06-18 NOTE — PATIENT INSTRUCTIONS - HE
Patient Instructions by Catarino Nicholson MD at 8/17/2020  8:00 AM     Author: Catarino Nicholson MD Service: -- Author Type: Physician    Filed: 8/17/2020  8:56 AM Encounter Date: 8/17/2020 Status: Signed    : Catarino Nicholson MD (Physician)         Patient Education     Exercise for a Healthier Heart  You may wonder how you can improve the health of your heart. If youre thinking about exercise, youre on the right track. You dont need to become an athlete, but you do need a certain amount of brisk exercise to help strengthen your heart. If you have been diagnosed with a heart condition, your doctor may recommend exercise to help stabilize your condition. To help make exercise a habit, choose safe, fun activities.       Be sure to check with your health care provider before starting an exercise program.    Why exercise?  Exercising regularly offers many healthy rewards. It can help you do all of the following:    Improve your blood cholesterol levels to help prevent further heart trouble    Lower your blood pressure to help prevent a stroke or heart attack    Control diabetes, or reduce your risk of getting this disease    Improve your heart and lung function    Reach and maintain a healthy weight    Make your muscles stronger and more limber so you can stay active    Prevent falls and fractures by slowing the loss of bone mass (osteoporosis)    Manage stress better  Exercise tips  Ease into your routine. Set small goals. Then build on them.  Exercise on most days. Aim for a total of 150 or more minutes of moderate to  vigorous intensity activity each week. Consider 40 minutes, 3 to 4 times a week. For best results, activity should last for 40 minutes on average. It is OK to work up to the 40 minute period over time. Examples of moderate-intensity activity is walking one mile in 15 minutes or 30 to 45 minutes of yard work.  Step up your daily activity level. Along with your exercise program, try being more  active throughout the day. Walk instead of drive. Do more household tasks or yard work.  Choose one or more activities you enjoy. Walking is one of the easiest things you can do. You can also try swimming, riding a bike, or taking an exercise class.  Stop exercising and call your doctor if you:    Have chest pain or feel dizzy or lightheaded    Feel burning, tightness, pressure, or heaviness in your chest, neck, shoulders, back, or arms    Have unusual shortness of breath    Have increased joint or muscle pain    Have palpitations or an irregular heartbeat      3428-3234 [employer] ( [employer] ). [address] [place] [id number]. All rights reserved. This information is not intended as a substitute for professional medical care. Always follow your healthcare professional's instructions.         Patient Education   Urinary Incontinence, Female (Adult)  Urinary incontinence means loss of control of the bladder. This problem affects many women, especially as they get older. If you have incontinence, you may be embarrassed to ask for help. But know that this problem can be treated.  Types of Incontinence  There are different types of incontinence. Two of the main types are described here. You can have more than one type.    Stress incontinence. With this type, urine leaks when pressure (stress) is put on the bladder. This may happen when you cough, sneeze, or laugh. Stress incontinence most often occurs because the pelvic floor muscles that support the bladder and urethra are weak. This can happen after pregnancy and vaginal childbirth or a hysterectomy. It can also be due to excess body weight or hormone changes.    Urge incontinence (also called overactive bladder). With this type, a sudden urge to urinate is felt often. This may happen even though there may not be much urine in the bladder. The need to urinate often during the night is common. Urge incontinence most often occurs because of bladder  spasms. This may be due to bladder irritation or infection. Damage to bladder nerves or pelvic muscles, constipation, and certain medicines can also lead to urge incontinence.  Treatment of urinary incontinence depends on the cause. Further evaluation is needed to find the type you have. This will likely include an exam and certain tests. Based on the results, you and your healthcare provider can then plan treatment. Until a diagnosis is made, the home care tips below can help relieve symptoms.  Home care    Do pelvic floor muscle exercises, if they are prescribed. The pelvic floor muscles help support the bladder and urethra. Many women find that their symptoms improve when doing special exercises that strengthen these muscles. To do the exercises contract the muscles you would use to stop your stream of urine, but do this when youre not urinating. Hold for 10 seconds, then relax. Repeat 10 to 20 times in a row, at least 3 times a day. Your provider may give you other instructions for how to do the exercises and how often.    Keep a bladder diary. This helps track how often and how much you urinate over a set period of time. Bring this diary with you to your next visit with the provider. The information can help your provider learn more about your bladder problem.    Lose weight, if advised to by your provider. Excess weight puts pressure on the bladder. Your provider can help you create a weight-loss plan thats right for you. This may include exercising more and making certain diet changes.    Don't consume foods and drinks that may irritate the bladder. These can include alcohol and caffeinated drinks.    Quit smoking. Smoking and other tobacco use can lead to chronic cough that strains the pelvic floor muscles. Smoking may also damage the bladder and urethra. Talk with your provider about treatments or methods you can use to quit smoking.    If drinking large amounts of fluid causes you to have symptoms, you may  be advised to limit your fluid intake. You may also be advised to drink most of your fluids during the day and to limit fluids at night.    If youre worried about urine leakage or accidents, you may wear absorbent pads to catch urine. Change the pads often. This helps reduce discomfort. It may also reduce the risk of skin or bladder infections.  Follow-up care  Follow up with your healthcare provider, or as directed. It may take some to find the right treatment for your problem. Your treatment plan may include special therapies or medicines. Certain procedures or surgery may also be options. Be sure to discuss any questions you have with your provider.  When to seek medical advice  Call the healthcare provider right away if any of these occur:    Fever of 100.4 F (38 C) or higher, or as directed by your provider    Bladder pain or fullness    Abdominal swelling    Nausea or vomiting    Back pain    Weakness, dizziness or fainting  Date Last Reviewed: 10/1/2017    8551-6771 [employer]. [address] [place] [address]. All rights reserved. This information is not intended as a substitute for professional medical care. Always follow your healthcare professional's instructions.       Advance Directive  Patients advance directive was discussed and I am comfortable with the patients wishes.  Patient Education   Personalized Prevention Plan  You are due for the preventive services outlined below.  Your care team is available to assist you in scheduling these services.  If you have already completed any of these items, please share that information with your care team to update in your medical record.  Health Maintenance   Topic Date Due   ? DEPRESSION ACTION PLAN  1955   ? HEPATITIS C SCREENING  1955   ? ZOSTER VACCINES (2 of 3) 10/16/2013   ? DXA SCAN  03/11/2018   ? INFLUENZA VACCINE RULE BASED (1) 08/01/2020   ? MEDICARE ANNUAL WELLNESS VISIT  08/13/2020   ? PNEUMOCOCCAL IMMUNIZATION  65+ LOW/MEDIUM RISK (2 of 2 - PPSV23) 08/13/2020   ? TD 18+ HE  10/15/2020   ? FALL RISK ASSESSMENT  07/02/2021   ? MAMMOGRAM  08/27/2021   ? LIPID  08/13/2024   ? ADVANCE CARE PLANNING  08/13/2024   ? COLORECTAL CANCER SCREENING  11/12/2029   ? HEPATITIS B VACCINES  Aged Out

## 2021-06-23 NOTE — TELEPHONE ENCOUNTER
Medication Question or Clarification  Who is calling: Pharmacy: fax  What medication are you calling about?: atenolol-chlorthalidone (TENORETIC) 50-25 mg per tablet  What dose do you take?: see above  How often are you taking the medication?: daily  Who prescribed the medication?: Catarino Nicholson MD  What is your question/concern?: Pharmacy faxing stating to please send two separate RX's for each medication as it is cheaper for the patient.  Please advise and send separate RX's if appropriate.  Pharmacy: [facility] #[id number]  Okay to leave a detailed message?: No  Site CMT - Please call the pharmacy to obtain any additional needed information.

## 2021-06-23 NOTE — TELEPHONE ENCOUNTER
Refill Approved    Rx renewed per Medication Renewal Policy. Medication was last renewed on 10/17/18.    Rosalina Jacques, [facility] Connection Triage/Med Refill 1/19/2019     Requested Prescriptions   Pending Prescriptions Disp Refills     citalopram (CELEXA) 20 MG tablet [Pharmacy Med Name: Citalopram Hydrobromide Oral Tablet 20 MG] 90 tablet 0     Sig: TAKE 1 TABLET BY MOUTH ONCE DAILY.    SSRI Refill Protocol  Passed - 1/19/2019  9:49 AM       Passed - PCP or prescribing provider visit in last year    Last office visit with prescriber/PCP: 10/3/2017 Catarino Nicholson MD OR same dept: 2/14/2018 Lizzette Bravo MD OR same specialty: 2/14/2018 Lizzette Bravo MD  Last physical: 5/20/2016 Last MTM visit: Visit date not found   Next visit within 3 mo: Visit date not found  Next physical within 3 mo: Visit date not found  Prescriber OR PCP: Catarino Nicholson MD  Last diagnosis associated with med order: 1. Depression  - citalopram (CELEXA) 20 MG tablet [Pharmacy Med Name: Citalopram Hydrobromide Oral Tablet 20 MG]; TAKE 1 TABLET BY MOUTH ONCE DAILY.  Dispense: 90 tablet; Refill: 0    If protocol passes may refill for 12 months if within 3 months of last provider visit (or a total of 15 months).

## 2021-06-23 NOTE — TELEPHONE ENCOUNTER
Medication Question or Clarification  Who is calling: Other: Fax from Pharmacy  What medication are you calling about?:  Atenolol-chlorthaladone  What dose do you take?:  50-25  How often are you taking the medication?: n/a  Who prescribed the medication?:  anny  What is your question/concern?:  Pleas send as two different scripts which is much less expensive for the [facility]  Pharmacy: [facility]  Okay to leave a detailed message?: Yes  Site CMT - Please call the pharmacy to obtain any additional needed information.

## 2021-06-25 NOTE — TELEPHONE ENCOUNTER
Patient calling wondering if it can be sent over today to the pharmacy since she is leaving to go out of town tomorrow morning. Please refill if appropriate, thank you.

## 2021-07-13 ENCOUNTER — RECORDS - HEALTHEAST (OUTPATIENT)
Dept: ADMINISTRATIVE | Facility: CLINIC | Age: 66
End: 2021-07-13

## 2021-07-21 ENCOUNTER — RECORDS - HEALTHEAST (OUTPATIENT)
Dept: ADMINISTRATIVE | Facility: CLINIC | Age: 66
End: 2021-07-21

## 2021-08-11 ENCOUNTER — TRANSFERRED RECORDS (OUTPATIENT)
Dept: HEALTH INFORMATION MANAGEMENT | Facility: CLINIC | Age: 66
End: 2021-08-11

## 2021-08-13 PROBLEM — N89.8 VAGINAL DISCHARGE: Status: ACTIVE | Noted: 2021-08-13

## 2021-08-14 DIAGNOSIS — I10 ESSENTIAL HYPERTENSION: ICD-10-CM

## 2021-08-16 ENCOUNTER — OFFICE VISIT (OUTPATIENT)
Dept: FAMILY MEDICINE | Facility: CLINIC | Age: 66
End: 2021-08-16
Payer: COMMERCIAL

## 2021-08-16 VITALS
SYSTOLIC BLOOD PRESSURE: 128 MMHG | DIASTOLIC BLOOD PRESSURE: 76 MMHG | OXYGEN SATURATION: 97 % | WEIGHT: 249 LBS | BODY MASS INDEX: 37.74 KG/M2 | HEART RATE: 68 BPM | HEIGHT: 68 IN

## 2021-08-16 DIAGNOSIS — I10 ESSENTIAL HYPERTENSION: Primary | ICD-10-CM

## 2021-08-16 DIAGNOSIS — Z12.31 ENCOUNTER FOR SCREENING MAMMOGRAM FOR BREAST CANCER: ICD-10-CM

## 2021-08-16 DIAGNOSIS — Z78.0 MENOPAUSE: ICD-10-CM

## 2021-08-16 DIAGNOSIS — Z00.01 ENCOUNTER FOR GENERAL ADULT MEDICAL EXAMINATION WITH ABNORMAL FINDINGS: ICD-10-CM

## 2021-08-16 DIAGNOSIS — F32.4 MAJOR DEPRESSIVE DISORDER WITH SINGLE EPISODE, IN PARTIAL REMISSION (H): ICD-10-CM

## 2021-08-16 DIAGNOSIS — Z11.59 ENCOUNTER FOR HEPATITIS C SCREENING TEST FOR LOW RISK PATIENT: ICD-10-CM

## 2021-08-16 DIAGNOSIS — E78.5 DYSLIPIDEMIA: ICD-10-CM

## 2021-08-16 PROCEDURE — G0438 PPPS, INITIAL VISIT: HCPCS | Performed by: FAMILY MEDICINE

## 2021-08-16 ASSESSMENT — PATIENT HEALTH QUESTIONNAIRE - PHQ9
SUM OF ALL RESPONSES TO PHQ QUESTIONS 1-9: 0
10. IF YOU CHECKED OFF ANY PROBLEMS, HOW DIFFICULT HAVE THESE PROBLEMS MADE IT FOR YOU TO DO YOUR WORK, TAKE CARE OF THINGS AT HOME, OR GET ALONG WITH OTHER PEOPLE: NOT DIFFICULT AT ALL
SUM OF ALL RESPONSES TO PHQ QUESTIONS 1-9: 0

## 2021-08-16 ASSESSMENT — ACTIVITIES OF DAILY LIVING (ADL): CURRENT_FUNCTION: HOUSEWORK REQUIRES ASSISTANCE

## 2021-08-16 ASSESSMENT — MIFFLIN-ST. JEOR: SCORE: 1707.96

## 2021-08-16 NOTE — PROGRESS NOTES
SUBJECTIVE:   Clarisse Romero is a 66 year old female who presents for Preventive Visit.      Patient has been advised of split billing requirements and indicates understanding: Yes   Are you in the first 12 months of your Medicare coverage?  No    HPI     She is generally healthy.  She takes atenolol and chlorthalidone for blood pressure management with ideal readings.  BMI is higher than ideal.  We spent some time today discussing nutrition and exercise as a means for weight loss.  She is experiencing more discomfort in the knees which is suggestive of underlying osteoarthritis.  Wishes to have self-directed therapy as the initial intervention.  She is a history of depression symptoms takes citalopram.  Reports symptoms are currently in remission.  She would like to continue the medication through the fall and winter months and consider tapering off next spring.  From a routine health prevention standpoint we reviewed screenings.  She is up-to-date with colon cancer screening, she is due for mammography, she would benefit from bone density screening.  She should have recheck of cholesterol and blood sugar while fasting.  Discussed checking hepatitis C antibody for routine screening.  Reviewed and discussed immunizations for which she is up-to-date.  Do you feel safe in your environment? Yes    Have you ever done Advance Care Planning? (For example, a Health Directive, POLST, or a discussion with a medical provider or your loved ones about your wishes): Yes, advance care planning is on file.    Fall risk : completedAnswers for HPI/ROS submitted by the patient on 8/16/2021  If you checked off any problems, how difficult have these problems made it for you to do your work, take care of things at home, or get along with other people?: Not difficult at all  PHQ9 TOTAL SCORE: 0      Cognitive Screening   1) Repeat 3 items   2) Clock draw: NORMAL  3) 3 item recall: Recalls 3 objects  Results: 3 items recalled: COGNITIVE  "IMPAIRMENT LESS LIKELY    Mini-CogTM Copyright S Emily. Licensed by the author for use in [facility]; reprinted with permission ([email or web address]). All rights reserved.          Reviewed and updated as needed this visit by clinical staff  Reviewed and updated as needed this visit by Provider    Social History     Tobacco Use     Smoking status: Never Smoker     Smokeless tobacco: Never Used   Substance Use Topics     Alcohol use: Not on file       Alcohol Use 8/16/2021   Prescreen: >3 drinks/day or >7 drinks/week? No   Prescreen: >3 drinks/day or >7 drinks/week? -   No flowsheet data found.      Current providers sharing in care for this patient include:   Patient Care Team:  Catarino Nicholson MD as PCP - General (Family Practice)  Catarino Nicholson MD as Assigned PCP    The following health maintenance items are reviewed in Epic and correct as of today:  Health Maintenance Due   Topic Date Due     DEXA  Never done     ANNUAL REVIEW OF HM ORDERS  Never done     DEPRESSION ACTION PLAN  Never done     HEPATITIS C SCREENING  Never done     PHQ-9  02/17/2021     FALL RISK ASSESSMENT  08/17/2021     MAMMO SCREENING  08/27/2021         Any new diagnosis of family breast, ovarian, or bowel cancer?     FHS-7: No flowsheet data found.      Pertinent mammograms are reviewed under the imaging tab.    Review of Systems  Complete review of systems is obtained.  Other than the specific considerations noted above complete review of systems is negative.      OBJECTIVE:   /76   Pulse 68   Ht 1.727 m (5' 8\")   Wt 112.9 kg (249 lb)   SpO2 97%   BMI 37.86 kg/m   Estimated body mass index is 37.86 kg/m  as calculated from the following:    Height as of this encounter: 1.727 m (5' 8\").    Weight as of this encounter: 112.9 kg (249 lb).  Physical Exam      General Appearance:    Alert, cooperative, no distress   Eyes:   No scleral icterus or conjunctival irritation       Ears:    Normal TM's and external ear canals, " "both ears   Throat:   Lips, mucosa, and tongue normal; teeth and gums normal   Neck:   Supple, symmetrical, trachea midline, no adenopathy;        thyroid:  No enlargement/tenderness/nodules   Lungs:     Clear to auscultation bilaterally, respirations unlabored, no wheezes or crackles   Heart:    Regular rate and rhythm,  No murmur   Abdomen:    Soft, no distention, no tenderness on palpation, no masses, no organomegaly     Extremities:  No edema, no joint swelling or redness, no evidence of any injuries   Skin:  No concerning skin findings, no suspicious moles, no rashes   Neurologic:  On gross examination there is no motor or sensory deficit.  Patient walks with a normal gait               ASSESSMENT / PLAN:   Clarisse was seen today for wellness visit and knee pain.    Diagnoses and all orders for this visit:    Essential hypertension  -     Comprehensive metabolic panel (BMP + Alb, Alk Phos, ALT, AST, Total. Bili, TP); Future    Encounter for general adult medical examination with abnormal findings    Dyslipidemia  -     Lipid panel reflex to direct LDL Fasting; Future  -     Comprehensive metabolic panel (BMP + Alb, Alk Phos, ALT, AST, Total. Bili, TP); Future    Major depressive disorder with single episode, in partial remission (H)    Menopause  -     DX Hip/Pelvis/Spine; Future    Encounter for hepatitis C screening test for low risk patient  -     Hepatitis C antibody; Future    Encounter for screening mammogram for breast cancer  -     *MA Screening Digital Bilateral; Future           Patient has been advised of split billing requirements and indicates understanding:   COUNSELING:      Estimated body mass index is 37.86 kg/m  as calculated from the following:    Height as of this encounter: 1.727 m (5' 8\").    Weight as of this encounter: 112.9 kg (249 lb).        She reports that she has never smoked. She has never used smokeless tobacco.      Appropriate preventive services were discussed with this patient, " including applicable screening as appropriate for cardiovascular disease, diabetes, osteopenia/osteoporosis, and glaucoma.  As appropriate for age/gender, discussed screening for colorectal cancer, prostate cancer, breast cancer, and cervical cancer. Checklist reviewing preventive services available has been given to the patient.    Reviewed patients plan of care and provided an AVS. The Basic Care Plan (routine screening as documented in Health Maintenance) for Clarisse meets the Care Plan requirement. This Care Plan has been established and reviewed with the Patient.    Counseling Resources:  ATP IV Guidelines  Pooled Cohorts Equation Calculator  Breast Cancer Risk Calculator  Breast Cancer: Medication to Reduce Risk  FRAX Risk Assessment  ICSI Preventive Guidelines  Dietary Guidelines for Americans, 2010  [employer] MyPlate  ASA Prophylaxis  Lung CA Screening    Catarino Nicholson MD, MD  [facility]    Identified Health Risks:

## 2021-08-17 RX ORDER — CHLORTHALIDONE 25 MG/1
TABLET ORAL
Qty: 90 TABLET | Refills: 3 | Status: SHIPPED | OUTPATIENT
Start: 2021-08-17 | End: 2022-08-22

## 2021-08-17 RX ORDER — ATENOLOL 50 MG/1
TABLET ORAL
Qty: 90 TABLET | Refills: 3 | Status: SHIPPED | OUTPATIENT
Start: 2021-08-17 | End: 2022-08-22

## 2021-08-17 ASSESSMENT — PATIENT HEALTH QUESTIONNAIRE - PHQ9: SUM OF ALL RESPONSES TO PHQ QUESTIONS 1-9: 0

## 2021-08-17 NOTE — TELEPHONE ENCOUNTER
"Last Written Prescription Date:  8/26/20  Last Fill Quantity: 90,  # refills: 3   Last office visit provider:  8/16/21     Requested Prescriptions   Pending Prescriptions Disp Refills     chlorthalidone (HYGROTON) 25 MG tablet [Pharmacy Med Name: Chlorthalidone Oral Tablet 25 MG] 90 tablet 0     Sig: TAKE ONE TABLET BY MOUTH ONE TIME DAILY       Diuretics (Including Combos) Protocol Passed - 8/14/2021  2:00 AM        Passed - Blood pressure under 140/90 in past 12 months     BP Readings from Last 3 Encounters:   08/16/21 128/76   08/17/20 126/76   07/02/20 118/72                 Passed - Recent (12 mo) or future (30 days) visit within the authorizing provider's specialty     Patient has had an office visit with the authorizing provider or a provider within the authorizing providers department within the previous 12 mos or has a future within next 30 days. See \"Patient Info\" tab in inbasket, or \"Choose Columns\" in Meds & Orders section of the refill encounter.              Passed - Medication is active on med list        Passed - Patient is age 18 or older        Passed - No active pregancy on record        Passed - Normal serum creatinine on file in past 12 months     Recent Labs   Lab Test 08/17/20  0907   CR 0.78              Passed - Normal serum potassium on file in past 12 months     Recent Labs   Lab Test 08/17/20  0907   POTASSIUM 4.2                    Passed - Normal serum sodium on file in past 12 months     Recent Labs   Lab Test 08/17/20  0907                 Passed - No positive pregnancy test in past 12 months           atenolol (TENORMIN) 50 MG tablet [Pharmacy Med Name: Atenolol Oral Tablet 50 MG] 90 tablet 0     Sig: TAKE ONE TABLET BY MOUTH ONE TIME DAILY       Beta-Blockers Protocol Passed - 8/14/2021  2:00 AM        Passed - Blood pressure under 140/90 in past 12 months     BP Readings from Last 3 Encounters:   08/16/21 128/76   08/17/20 126/76   07/02/20 118/72                 Passed - " "Patient is age 6 or older        Passed - Recent (12 mo) or future (30 days) visit within the authorizing provider's specialty     Patient has had an office visit with the authorizing provider or a provider within the authorizing providers department within the previous 12 mos or has a future within next 30 days. See \"Patient Info\" tab in inbasket, or \"Choose Columns\" in Meds & Orders section of the refill encounter.              Passed - Medication is active on med list             Luciano Rios RN 08/17/21 7:38 AM  "

## 2021-08-26 ENCOUNTER — ANCILLARY PROCEDURE (OUTPATIENT)
Dept: BONE DENSITY | Facility: CLINIC | Age: 66
End: 2021-08-26
Attending: FAMILY MEDICINE
Payer: COMMERCIAL

## 2021-08-26 ENCOUNTER — HOSPITAL ENCOUNTER (OUTPATIENT)
Dept: MAMMOGRAPHY | Facility: CLINIC | Age: 66
Discharge: HOME | End: 2021-08-26
Attending: FAMILY MEDICINE | Admitting: FAMILY MEDICINE
Payer: COMMERCIAL

## 2021-08-26 DIAGNOSIS — Z78.0 MENOPAUSE: ICD-10-CM

## 2021-08-26 DIAGNOSIS — Z12.31 ENCOUNTER FOR SCREENING MAMMOGRAM FOR BREAST CANCER: ICD-10-CM

## 2021-08-26 PROCEDURE — 77080 DXA BONE DENSITY AXIAL: CPT | Mod: TC | Performed by: RADIOLOGY

## 2021-08-26 PROCEDURE — 77063 BREAST TOMOSYNTHESIS BI: CPT

## 2021-09-18 DIAGNOSIS — J32.9 SINUSITIS, UNSPECIFIED CHRONICITY, UNSPECIFIED LOCATION: ICD-10-CM

## 2021-09-18 NOTE — TELEPHONE ENCOUNTER
"Routing refill request to provider for review/approval because:  Patient needs to be seen because it has been more than 1 year since last office visit.  Last Written Prescription Date:  6/1/21  Last Fill Quantity: 16g,  # refills: 3   Last office visit provider:  8/17/20       Requested Prescriptions   Pending Prescriptions Disp Refills     fluticasone (FLONASE) 50 MCG/ACT nasal spray [Pharmacy Med Name: Fluticasone Propionate Nasal Suspension 50 MCG/ACT] 16 g 0     Sig: Inhale 2 sprays in each nostril once daily.       Nasal Allergy Protocol Passed - 9/18/2021  2:00 AM        Passed - Patient is age 12 or older        Passed - Recent (12 mo) or future (30 days) visit within the authorizing provider's specialty     Patient has had an office visit with the authorizing provider or a provider within the authorizing providers department within the previous 12 mos or has a future within next 30 days. See \"Patient Info\" tab in inbasket, or \"Choose Columns\" in Meds & Orders section of the refill encounter.              Passed - Medication is active on med list             brendon hawkins RN 09/18/21 12:37 PM  "

## 2021-09-19 ENCOUNTER — HEALTH MAINTENANCE LETTER (OUTPATIENT)
Age: 66
End: 2021-09-19

## 2021-09-19 RX ORDER — FLUTICASONE PROPIONATE 50 MCG
SPRAY, SUSPENSION (ML) NASAL
Qty: 16 G | Refills: 3 | Status: SHIPPED | OUTPATIENT
Start: 2021-09-19 | End: 2022-01-26

## 2021-10-09 DIAGNOSIS — F32.A DEPRESSION: ICD-10-CM

## 2021-10-10 RX ORDER — CITALOPRAM HYDROBROMIDE 20 MG/1
20 TABLET ORAL DAILY
Qty: 90 TABLET | Refills: 1 | Status: SHIPPED | OUTPATIENT
Start: 2021-10-10 | End: 2022-03-30

## 2021-10-10 NOTE — TELEPHONE ENCOUNTER
"Last Written Prescription Date:  8/26/20  Last Fill Quantity: 90,  # refills: 3   Last office visit provider:  8/16/21     Requested Prescriptions   Pending Prescriptions Disp Refills     citalopram (CELEXA) 20 MG tablet [Pharmacy Med Name: Citalopram Hydrobromide Oral Tablet 20 MG] 90 tablet 0     Sig: TAKE ONE TABLET BY MOUTH ONE TIME DAILY       SSRIs Protocol Passed - 10/9/2021  2:00 AM        Passed - PHQ-9 score less than 5 in past 6 months     Please review last PHQ-9 score.           Passed - Medication is active on med list        Passed - Patient is age 18 or older        Passed - No active pregnancy on record        Passed - No positive pregnancy test in last 12 months        Passed - Recent (6 mo) or future (30 days) visit within the authorizing provider's specialty     Patient had office visit in the last 6 months or has a visit in the next 30 days with authorizing provider or within the authorizing provider's specialty.  See \"Patient Info\" tab in inbasket, or \"Choose Columns\" in Meds & Orders section of the refill encounter.                 Luciano Rios RN 10/10/21 1:09 PM  "

## 2021-11-02 ENCOUNTER — LAB (OUTPATIENT)
Dept: LAB | Facility: CLINIC | Age: 66
End: 2021-11-02
Payer: COMMERCIAL

## 2021-11-02 DIAGNOSIS — Z11.59 ENCOUNTER FOR HEPATITIS C SCREENING TEST FOR LOW RISK PATIENT: ICD-10-CM

## 2021-11-02 DIAGNOSIS — I10 ESSENTIAL HYPERTENSION: ICD-10-CM

## 2021-11-02 DIAGNOSIS — E78.5 DYSLIPIDEMIA: ICD-10-CM

## 2021-11-02 LAB
ALBUMIN SERPL-MCNC: 4 G/DL (ref 3.5–5)
ALP SERPL-CCNC: 70 U/L (ref 45–120)
ALT SERPL W P-5'-P-CCNC: 25 U/L (ref 0–45)
ANION GAP SERPL CALCULATED.3IONS-SCNC: 7 MMOL/L (ref 5–18)
AST SERPL W P-5'-P-CCNC: 19 U/L (ref 0–40)
BILIRUB SERPL-MCNC: 1.2 MG/DL (ref 0–1)
BUN SERPL-MCNC: 13 MG/DL (ref 8–22)
CALCIUM SERPL-MCNC: 9.9 MG/DL (ref 8.5–10.5)
CHLORIDE BLD-SCNC: 102 MMOL/L (ref 98–107)
CHOLEST SERPL-MCNC: 180 MG/DL
CO2 SERPL-SCNC: 32 MMOL/L (ref 22–31)
CREAT SERPL-MCNC: 0.75 MG/DL (ref 0.6–1.1)
FASTING STATUS PATIENT QL REPORTED: YES
GFR SERPL CREATININE-BSD FRML MDRD: 82 ML/MIN/1.73M2
GLUCOSE BLD-MCNC: 117 MG/DL (ref 70–125)
HDLC SERPL-MCNC: 40 MG/DL
LDLC SERPL CALC-MCNC: 112 MG/DL
POTASSIUM BLD-SCNC: 4 MMOL/L (ref 3.5–5)
PROT SERPL-MCNC: 6.8 G/DL (ref 6–8)
SODIUM SERPL-SCNC: 141 MMOL/L (ref 136–145)
TRIGL SERPL-MCNC: 141 MG/DL

## 2021-11-02 PROCEDURE — 80061 LIPID PANEL: CPT

## 2021-11-02 PROCEDURE — 86803 HEPATITIS C AB TEST: CPT

## 2021-11-02 PROCEDURE — 36415 COLL VENOUS BLD VENIPUNCTURE: CPT

## 2021-11-02 PROCEDURE — 80053 COMPREHEN METABOLIC PANEL: CPT

## 2021-11-03 LAB — HCV AB SERPL QL IA: NEGATIVE

## 2022-01-25 DIAGNOSIS — J32.9 SINUSITIS, UNSPECIFIED CHRONICITY, UNSPECIFIED LOCATION: ICD-10-CM

## 2022-01-26 RX ORDER — FLUTICASONE PROPIONATE 50 MCG
SPRAY, SUSPENSION (ML) NASAL
Qty: 16 G | Refills: 3 | Status: SHIPPED | OUTPATIENT
Start: 2022-01-26 | End: 2022-08-22

## 2022-01-26 NOTE — TELEPHONE ENCOUNTER
"Last Written Prescription Date:  9/19/21  Last Fill Quantity: 16g,  # refills: 3   Last office visit provider:  8/16/21     Requested Prescriptions   Pending Prescriptions Disp Refills     fluticasone (FLONASE) 50 MCG/ACT nasal spray [Pharmacy Med Name: Fluticasone Propionate Nasal Suspension 50 MCG/ACT] 16 g 0     Sig: Inhale 2 sprays in each nostril once daily.       Nasal Allergy Protocol Passed - 1/25/2022  2:00 AM        Passed - Patient is age 12 or older        Passed - Recent (12 mo) or future (30 days) visit within the authorizing provider's specialty     Patient has had an office visit with the authorizing provider or a provider within the authorizing providers department within the previous 12 mos or has a future within next 30 days. See \"Patient Info\" tab in inbasket, or \"Choose Columns\" in Meds & Orders section of the refill encounter.              Passed - Medication is active on med list             Elizabeth Pineda RN 01/26/22 1:50 PM  "

## 2022-07-24 ENCOUNTER — NURSE TRIAGE (OUTPATIENT)
Dept: NURSING | Facility: CLINIC | Age: 67
End: 2022-07-24

## 2022-07-24 NOTE — TELEPHONE ENCOUNTER
"Coronavirus (COVID-19) Notification    Caller Name (Patient, parent, daughter/son, grandparent, etc)  Patient calling after testing positive for Covid. Sore throat started on Thursday. Symptoms worsened yesterday.  Cough is not productive. Patient able to take a deep breath, there is some chest tightness and is asking if taking her albuterol inhaler would be okay. Patient reports the chest tightness is always there, \"like the cold dropped from my head to my chest. I can get a little short of breath when I'm talking but otherwise fine, able to take a deep breath. \" Patient is at cabin in [place] and inhaler is at home in [place].  Patient coughing fairly frequently on the phone.      Protocol recommends ED or PCP triage  Page to on call provider Dr. Marcelle Gamino at 10:11 am  Dr. Gamino recommending patient be seen in person today. Urgent care close to cabin is okay versus ED visit.     Call to patient to give recommendations. No answer. Call going straight to Voicemail. Left message with provider recommendations to be seen today at Urgent Care.   Second call to patient. Relayed recommendation from on call provider Dr. Gamino. Patient reports there is an urgent care [place] far from [place]. Patient will be seen there today.   Jaquelin Sanchez RN   07/24/22 11:09 AM  [facility] Nurse Advisor          Gather patient reported symptoms   Assessment   Current Symptoms at time of phone call, reported by patient: (if no symptoms, document No symptoms] Cough, sore throat, head congestion, tightness in her chest, non-productive cough     Date of Symptom(s) onset (if applicable) 7/21/2022     If at time of call, Patients symptoms hare worsened, the Patient should contact 911 or have someone drive them to Emergency Dept promptly:      If Patient calling 911, inform 911 personal that you have tested positive for the Coronavirus (COVID-19).  Place mask on and await 911 to arrive.    If Emergency Dept, If possible, please have " another adult drive you to the Emergency Dept but you need to wear mask when in contact with other people.      Monoclonal Antibody Administration    You may be eligible to receive a new treatment with a monoclonal antibody for preventing hospitalization in patients at high risk for complications from COVID-19. This medication is still experimental and available on a limited basis; it is given through an IV and must be given at an infusion center. Please note that not all people who are eligible will receive the medication since it is in limited supply.  Is the patient symptomatic and onset of symptoms within the last 7 days?  Yes  Is the patient interested in a visit with a provider to discuss treatment options?: Yes  Is the patient seen at [facility]?  No: Warm transfer caller to [phone] to be scheduled with a virtual urgent provider.  During transfer, instruct  on appropriate time frame for visit     Review information with Patient    Your result was positive. This means you have COVID-19 (coronavirus).      How can I protect others?    These guidelines are for isolating before returning to work, school or .       If you DO have symptoms:  o Stay home and away from others  - For at least 5 days after your symptoms started, AND   - You are fever free for 24 hours (with no medicine that reduces fever), AND  - Your other symptoms are better.  o Wear a mask for 10 full days any time you are around others.    If you DON'T have symptoms:  o Stay at home and away from others for at least 5 days after your positive test.  o Wear a mask for 10 full days any time you are around others.    There may be different guidelines for healthcare facilities. Please check with the specific sites before arriving.     If you've been told by a doctor that you were severely ill with COVID-19 or are immunocompromised, you should isolate for at least 10 days.    You should not go back to work until you meet  the guidelines above for ending your home isolation. You don't need to be retested for COVID-19 before going back to work--studies show that you won't spread the virus if it's been at least 10 days since your symptoms started (or 20 days, if you have a weak immune system).    Employers, schools, and daycares: This is an official notice for this person's medical guidelines for returning in-person. They must meet the above guidelines before going back to work, school, or  in person.    You will receive a positive COVID-19 letter via [employer] or the mail soon with additional self-care information.      Would you like me to review some of that information with you now?  Yes    How can I take care of myself?      Get lots of rest. Drink extra fluids (unless a doctor has told you not to).      Take Tylenol (acetaminophen) for fever or pain. If you have liver or kidney problems, ask your family doctor if it's okay to take Tylenol.     Take either:     650 mg (two 325 mg pills) every 4 to 6 hours, or     1,000 mg (two 500 mg pills) every 8 hours as needed.     Note: Do not take more than 3,000 mg in one day. Acetaminophen is found in many medicines (both prescribed and over-the-counter medicines). Read all labels to be sure you don't take too much.    For children, check the Tylenol bottle for the right dose (based on their age or weight).      If you have other health problems (like cancer, heart failure, an organ transplant or severe kidney disease): Call your specialty clinic if you don't feel better in the next 2 days.      Know when to call 911: Emergency warning signs include:    Trouble breathing or shortness of breath    Pain or pressure in the chest that doesn't go away    Feeling confused like you haven't felt before, or not being able to wake up    Bluish-colored lips or face          Jaquelin Sanchez RN    Reason for Disposition    Chest pain or pressure    Additional Information    Negative: SEVERE  difficulty breathing (e.g., struggling for each breath, speaks in single words)    Negative: Difficult to awaken or acting confused (e.g., disoriented, slurred speech)    Negative: Bluish (or gray) lips or face now    Negative: Shock suspected (e.g., cold/pale/clammy skin, too weak to stand, low BP, rapid pulse)    Negative: Sounds like a life-threatening emergency to the triager    Negative: [1] Diagnosed or suspected COVID-19 AND [2] symptoms lasting 3 or more weeks    Negative: [1] COVID-19 exposure AND [2] no symptoms    Negative: COVID-19 vaccine reaction suspected (e.g., fever, headache, muscle aches) occurring 1 to 3 days after getting vaccine    Negative: COVID-19 vaccine, questions about    Negative: [1] Lives with someone known to have influenza (flu test positive) AND [2] flu-like symptoms (e.g., cough, runny nose, sore throat, SOB; with or without fever)    Negative: [1] Adult with possible COVID-19 symptoms AND [2] triager concerned about severity of symptoms or other causes    Negative: COVID-19 and breastfeeding, questions about    Negative: SEVERE or constant chest pain or pressure  (Exception: Mild central chest pain, present only when coughing.)    Negative: MODERATE difficulty breathing (e.g., speaks in phrases, SOB even at rest, pulse 100-120)    Negative: [1] Headache AND [2] stiff neck (can't touch chin to chest)    Negative: Oxygen level (e.g., pulse oximetry) 90 percent or lower    Protocols used: CORONAVIRUS (COVID-19) DIAGNOSED OR [id number] 1.18.2022

## 2022-08-03 ENCOUNTER — TRANSFERRED RECORDS (OUTPATIENT)
Dept: HEALTH INFORMATION MANAGEMENT | Facility: CLINIC | Age: 67
End: 2022-08-03

## 2022-08-19 PROBLEM — K64.9 HEMORRHOIDS WITHOUT COMPLICATION: Status: ACTIVE | Noted: 2022-08-19

## 2022-08-19 ASSESSMENT — ACTIVITIES OF DAILY LIVING (ADL): CURRENT_FUNCTION: NO ASSISTANCE NEEDED

## 2022-08-19 ASSESSMENT — ENCOUNTER SYMPTOMS
NAUSEA: 0
HEARTBURN: 0
SHORTNESS OF BREATH: 0
DIZZINESS: 0
COUGH: 0
EYE PAIN: 0
HEADACHES: 0
BREAST MASS: 0
CHILLS: 0
PALPITATIONS: 0
FEVER: 0
DIARRHEA: 0
FREQUENCY: 0
HEMATOCHEZIA: 0
ABDOMINAL PAIN: 0
SORE THROAT: 0
WEAKNESS: 0
PARESTHESIAS: 0
HEMATURIA: 0
JOINT SWELLING: 0
ARTHRALGIAS: 1
MYALGIAS: 0
NERVOUS/ANXIOUS: 0
DYSURIA: 0
CONSTIPATION: 0

## 2022-08-22 ENCOUNTER — OFFICE VISIT (OUTPATIENT)
Dept: FAMILY MEDICINE | Facility: CLINIC | Age: 67
End: 2022-08-22
Payer: COMMERCIAL

## 2022-08-22 VITALS
SYSTOLIC BLOOD PRESSURE: 134 MMHG | OXYGEN SATURATION: 96 % | DIASTOLIC BLOOD PRESSURE: 76 MMHG | HEART RATE: 72 BPM | HEIGHT: 68 IN | RESPIRATION RATE: 18 BRPM | BODY MASS INDEX: 37.66 KG/M2 | WEIGHT: 248.5 LBS

## 2022-08-22 DIAGNOSIS — Z00.00 ENCOUNTER FOR MEDICARE ANNUAL WELLNESS EXAM: Primary | ICD-10-CM

## 2022-08-22 DIAGNOSIS — F32.A DEPRESSION, UNSPECIFIED DEPRESSION TYPE: ICD-10-CM

## 2022-08-22 DIAGNOSIS — I10 ESSENTIAL HYPERTENSION: ICD-10-CM

## 2022-08-22 DIAGNOSIS — J32.9 SINUSITIS, UNSPECIFIED CHRONICITY, UNSPECIFIED LOCATION: ICD-10-CM

## 2022-08-22 LAB
ALBUMIN SERPL BCG-MCNC: 4.5 G/DL (ref 3.5–5.2)
ALP SERPL-CCNC: 67 U/L (ref 35–104)
ALT SERPL W P-5'-P-CCNC: 25 U/L (ref 10–35)
ANION GAP SERPL CALCULATED.3IONS-SCNC: 9 MMOL/L (ref 7–15)
AST SERPL W P-5'-P-CCNC: 27 U/L (ref 10–35)
BILIRUB SERPL-MCNC: 1.1 MG/DL
BUN SERPL-MCNC: 10.7 MG/DL (ref 8–23)
CALCIUM SERPL-MCNC: 10.1 MG/DL (ref 8.8–10.2)
CHLORIDE SERPL-SCNC: 100 MMOL/L (ref 98–107)
CHOLEST SERPL-MCNC: 213 MG/DL
CREAT SERPL-MCNC: 0.63 MG/DL (ref 0.51–0.95)
DEPRECATED HCO3 PLAS-SCNC: 29 MMOL/L (ref 22–29)
GFR SERPL CREATININE-BSD FRML MDRD: >90 ML/MIN/1.73M2
GLUCOSE SERPL-MCNC: 122 MG/DL (ref 70–99)
HDLC SERPL-MCNC: 46 MG/DL
HOLD SPECIMEN: NORMAL
LDLC SERPL CALC-MCNC: 131 MG/DL
NONHDLC SERPL-MCNC: 167 MG/DL
POTASSIUM SERPL-SCNC: 4.4 MMOL/L (ref 3.4–5.3)
PROT SERPL-MCNC: 7.1 G/DL (ref 6.4–8.3)
SODIUM SERPL-SCNC: 138 MMOL/L (ref 136–145)
TRIGL SERPL-MCNC: 178 MG/DL

## 2022-08-22 PROCEDURE — 36415 COLL VENOUS BLD VENIPUNCTURE: CPT | Performed by: FAMILY MEDICINE

## 2022-08-22 PROCEDURE — G0439 PPPS, SUBSEQ VISIT: HCPCS | Performed by: FAMILY MEDICINE

## 2022-08-22 PROCEDURE — 80061 LIPID PANEL: CPT | Performed by: FAMILY MEDICINE

## 2022-08-22 PROCEDURE — 80053 COMPREHEN METABOLIC PANEL: CPT | Performed by: FAMILY MEDICINE

## 2022-08-22 RX ORDER — FLUTICASONE PROPIONATE 50 MCG
SPRAY, SUSPENSION (ML) NASAL
Qty: 16 G | Refills: 3 | Status: SHIPPED | OUTPATIENT
Start: 2022-08-22

## 2022-08-22 RX ORDER — ATENOLOL 50 MG/1
50 TABLET ORAL DAILY
Qty: 90 TABLET | Refills: 3 | Status: SHIPPED | OUTPATIENT
Start: 2022-08-22 | End: 2023-08-28

## 2022-08-22 RX ORDER — CHLORTHALIDONE 25 MG/1
25 TABLET ORAL DAILY
Qty: 90 TABLET | Refills: 3 | Status: SHIPPED | OUTPATIENT
Start: 2022-08-22 | End: 2023-08-28

## 2022-08-22 RX ORDER — CITALOPRAM HYDROBROMIDE 20 MG/1
20 TABLET ORAL DAILY
Qty: 90 TABLET | Refills: 3 | Status: SHIPPED | OUTPATIENT
Start: 2022-08-22 | End: 2023-08-28

## 2022-08-22 ASSESSMENT — PATIENT HEALTH QUESTIONNAIRE - PHQ9
10. IF YOU CHECKED OFF ANY PROBLEMS, HOW DIFFICULT HAVE THESE PROBLEMS MADE IT FOR YOU TO DO YOUR WORK, TAKE CARE OF THINGS AT HOME, OR GET ALONG WITH OTHER PEOPLE: NOT DIFFICULT AT ALL
SUM OF ALL RESPONSES TO PHQ QUESTIONS 1-9: 0
SUM OF ALL RESPONSES TO PHQ QUESTIONS 1-9: 0

## 2022-08-22 ASSESSMENT — ACTIVITIES OF DAILY LIVING (ADL)
CONCENTRATING,_REMEMBERING_OR_MAKING_DECISIONS_DIFFICULTY: NO
TOILETING_ISSUES: NO
DIFFICULTY_COMMUNICATING: NO
FALL_HISTORY_WITHIN_LAST_SIX_MONTHS: NO
CHANGE_IN_FUNCTIONAL_STATUS_SINCE_ONSET_OF_CURRENT_ILLNESS/INJURY: NO
HEARING_DIFFICULTY_OR_DEAF: NO
DRESSING/BATHING_DIFFICULTY: NO
DIFFICULTY_EATING/SWALLOWING: NO
WEAR_GLASSES_OR_BLIND: YES
WALKING_OR_CLIMBING_STAIRS_DIFFICULTY: NO
DOING_ERRANDS_INDEPENDENTLY_DIFFICULTY: NO

## 2022-08-22 ASSESSMENT — PAIN SCALES - GENERAL: PAINLEVEL: NO PAIN (0)

## 2022-08-22 NOTE — PROGRESS NOTES
SUBJECTIVE:   Clarisse Romero is a 67 year old female who presents for Preventive Visit.    She has hypertension under good control.  Body mass index is higher than ideal.  Discussed nutrition and exercise as a means to improve.  She has a history of depression.  Reports that depression remains in remission.  Feels she is well served to remain on the citalopram at the current dose.  There are no side effects.  We did discussion today regarding her role in helping her son navigate his mental illness which she believes is most consistent with OCD.  Provided her with some recommendations for resources.  Discussed and reviewed routine health prevention including immunizations, breast cancer screening, colon cancer screening.  Also reviewed recent bone density testing that showed normal bone density last year.      Patient has been advised of split billing requirements and indicates understanding: Yes  Are you in the first 12 months of your Medicare coverage?  No    Do you feel safe in your environment? Yes    Have you ever done Advance Care Planning? (For example, a Health Directive, POLST, or a discussion with a medical provider or your loved ones about your wishes): Yes, advance care planning is on file.      Cognitive Screening   1) Repeat 3 items (Leader, Season, Table)    2) Clock draw: NORMAL  3) 3 item recall: Recalls 3 objects  Results: 3 items recalled: COGNITIVE IMPAIRMENT LESS LIKELY    Mini-CogTM Copyright S Emily. Licensed by the author for use in [facility]; reprinted with permission ([email or web address]). All rights reserved.        Reviewed and updated as needed this visit by clinical staff    Reviewed and updated as needed this visit by Provider    Social History     Tobacco Use     Smoking status: Never Smoker     Smokeless tobacco: Never Used   Substance Use Topics     Alcohol use: Not on file     If you drink alcohol do you typically have >3 drinks per day or >7 drinks per week? No    Alcohol  "Use 8/19/2022   Prescreen: >3 drinks/day or >7 drinks/week? No   Prescreen: >3 drinks/day or >7 drinks/week? -   No flowsheet data found.    Current providers sharing in care for this patient include:   Patient Care Team:  Catarino Nicholson MD as PCP - General (Family Practice)  Catarino Nicholson MD as Assigned PCP    The following health maintenance items are reviewed in Epic and correct as of today:  Health Maintenance Due   Topic Date Due     ANNUAL REVIEW OF HM ORDERS  Never done     DEPRESSION ACTION PLAN  Never done     MEDICARE ANNUAL WELLNESS VISIT  08/16/2022     Answers for HPI/ROS submitted by the patient on 8/22/2022  If you checked off any problems, how difficult have these problems made it for you to do your work, take care of things at home, or get along with other people?: Not difficult at all  PHQ9 TOTAL SCORE: 0  In general, how would you rate your overall physical health?: excellent  Frequency of exercise:: 2-3 days/week  Do you usually eat at least 4 servings of fruit and vegetables a day, include whole grains & fiber, and avoid regularly eating high fat or \"junk\" foods? : Yes  Taking medications regularly:: Yes  Medication side effects:: None  Activities of Daily Living: no assistance needed  Home safety: throw rugs in the hallway, lack of grab bars in the bathroom  Hearing Impairment:: no hearing concerns  In the past 6 months, have you been bothered by leaking of urine?: Yes  In general, how would you rate your overall mental or emotional health?: excellent  Additional concerns today:: No  Duration of exercise:: 15-30 minutes          FHS-7:   Breast CA Risk Assessment (FHS-7) 8/26/2021 8/19/2022   Did any of your first-degree relatives have breast or ovarian cancer? No Unknown   Did any of your relatives have bilateral breast cancer? No No   Did any man in your family have breast cancer? No No   Did any woman in your family have breast and ovarian cancer? No Yes   Did any woman in your family " "have breast cancer before age 50 y? No No   Do you have 2 or more relatives with breast and/or ovarian cancer? No No   Do you have 2 or more relatives with breast and/or bowel cancer? No Yes     Pertinent mammograms are reviewed under the imaging tab.    Review of Systems  Complete review of systems is obtained.  Other than the specific considerations noted above complete review of systems is negative.      OBJECTIVE:   /76   Pulse 72   Resp 18   Ht 1.727 m (5' 8\")   Wt 112.7 kg (248 lb 8 oz)   SpO2 96%   BMI 37.78 kg/m   Estimated body mass index is 37.78 kg/m  as calculated from the following:    Height as of this encounter: 1.727 m (5' 8\").    Weight as of this encounter: 112.7 kg (248 lb 8 oz).  Physical Exam          General Appearance:    Alert, cooperative, no distress   Eyes:   No scleral icterus or conjunctival irritation       Ears:    Normal TM's and external ear canals, both ears   Throat:   Lips, mucosa, and tongue normal; teeth and gums normal   Neck:   Supple, symmetrical, trachea midline, no adenopathy;        thyroid:  No enlargement/tenderness/nodules   Lungs:     Clear to auscultation bilaterally, respirations unlabored, no wheezes or crackles   Heart:    Regular rate and rhythm,  No murmur   Abdomen:    Soft, no distention, no tenderness on palpation, no masses, no organomegaly     Extremities:  No edema, no joint swelling or redness, no evidence of any injuries   Skin:  No concerning skin findings, no suspicious moles, no rashes   Neurologic:  On gross examination there is no motor or sensory deficit.  Patient walks with a normal gait       ASSESSMENT / PLAN:   Clarisse was seen today for wellness visit.    Diagnoses and all orders for this visit:    Encounter for Medicare annual wellness exam  -     Comprehensive metabolic panel (BMP + Alb, Alk Phos, ALT, AST, Total. Bili, TP)  -     Lipid panel reflex to direct LDL Fasting  -     Extra Tube; Future  -     Extra Tube    Essential " "hypertension  -     atenolol (TENORMIN) 50 MG tablet; Take 1 tablet (50 mg) by mouth daily  -     chlorthalidone (HYGROTON) 25 MG tablet; Take 1 tablet (25 mg) by mouth daily  -     Extra Tube; Future  -     Extra Tube    Depression, unspecified depression type  -     citalopram (CELEXA) 20 MG tablet; Take 1 tablet (20 mg) by mouth daily  -     Extra Tube; Future  -     Extra Tube    Sinusitis, unspecified chronicity, unspecified location  -     fluticasone (FLONASE) 50 MCG/ACT nasal spray; Inhale 2 sprays in each nostril once daily.  -     Extra Tube; Future  -     Extra Tube    Other orders  -     Cancel: Extra Tube; Future        Patient has been advised of split billing requirements and indicates understanding: Yes    COUNSELING:  Reviewed preventive health counseling, as reflected in patient instructions       Regular exercise       Healthy diet/nutrition       Vision screening       Dental care       Osteoporosis prevention/bone health       Colon cancer screening    Estimated body mass index is 37.78 kg/m  as calculated from the following:    Height as of this encounter: 1.727 m (5' 8\").    Weight as of this encounter: 112.7 kg (248 lb 8 oz).    Weight management plan: Discussed healthy diet and exercise guidelines    She reports that she has never smoked. She has never used smokeless tobacco.      Appropriate preventive services were discussed with this patient, including applicable screening as appropriate for cardiovascular disease, diabetes, osteopenia/osteoporosis, and glaucoma.  As appropriate for age/gender, discussed screening for colorectal cancer, prostate cancer, breast cancer, and cervical cancer. Checklist reviewing preventive services available has been given to the patient.    Reviewed patients plan of care and provided an AVS. The Basic Care Plan (routine screening as documented in Health Maintenance) for Clarisse meets the Care Plan requirement. This Care Plan has been established and reviewed " with the Patient.    Counseling Resources:  ATP IV Guidelines  Pooled Cohorts Equation Calculator  Breast Cancer Risk Calculator  Breast Cancer: Medication to Reduce Risk  FRAX Risk Assessment  ICSI Preventive Guidelines  Dietary Guidelines for Americans, 2010  USDA's MyPlate  ASA Prophylaxis  Lung CA Screening    Catarino Nicholson MD, MD  [facility]    Identified Health Risks:

## 2022-11-21 ENCOUNTER — HEALTH MAINTENANCE LETTER (OUTPATIENT)
Age: 67
End: 2022-11-21

## 2023-01-11 ENCOUNTER — OFFICE VISIT (OUTPATIENT)
Dept: FAMILY MEDICINE | Facility: CLINIC | Age: 68
End: 2023-01-11
Payer: COMMERCIAL

## 2023-01-11 VITALS
OXYGEN SATURATION: 95 % | HEIGHT: 68 IN | DIASTOLIC BLOOD PRESSURE: 80 MMHG | HEART RATE: 66 BPM | BODY MASS INDEX: 37.44 KG/M2 | WEIGHT: 247 LBS | TEMPERATURE: 97.9 F | SYSTOLIC BLOOD PRESSURE: 134 MMHG

## 2023-01-11 DIAGNOSIS — H02.843 PAIN AND SWELLING OF EYELID OF RIGHT EYE: Primary | ICD-10-CM

## 2023-01-11 DIAGNOSIS — H02.89 PAIN AND SWELLING OF EYELID OF RIGHT EYE: Primary | ICD-10-CM

## 2023-01-11 PROBLEM — K64.9 HEMORRHOIDS WITHOUT COMPLICATION: Status: RESOLVED | Noted: 2022-08-19 | Resolved: 2023-01-11

## 2023-01-11 PROBLEM — N89.8 VAGINAL DISCHARGE: Status: RESOLVED | Noted: 2021-08-13 | Resolved: 2023-01-11

## 2023-01-11 PROCEDURE — 99213 OFFICE O/P EST LOW 20 MIN: CPT | Performed by: FAMILY MEDICINE

## 2023-01-11 RX ORDER — ERYTHROMYCIN 5 MG/G
OINTMENT OPHTHALMIC
Qty: 3.5 G | Refills: 0 | Status: SHIPPED | OUTPATIENT
Start: 2023-01-11 | End: 2023-08-24

## 2023-01-11 ASSESSMENT — PAIN SCALES - GENERAL: PAINLEVEL: SEVERE PAIN (6)

## 2023-01-11 NOTE — PROGRESS NOTES
Assessment & Plan     Pain and swelling of eyelid of right eye  This appears consistent with hordeolum with some surrounding inflammatory erythema.  Recommended application of erythromycin ointment in addition to her warm packing.  Only if redness spreads rapidly or lid swelling worsens, would start oral antibiotics and follow up within 24 hours of starting.  Gave printed Rx for oral antibiotic as patient will be traveling.  - erythromycin (ROMYCIN) 5 MG/GM ophthalmic ointment; Apply a 1 cm ribbon to right eye at least 4 times daily for up to 7-10 days  - amoxicillin-clavulanate (AUGMENTIN) 875-125 MG tablet; Take 1 tablet by mouth 2 times daily for 7 days                 Return in about 7 months (around 8/11/2023) for Routine preventive.    Dena Rios MD  [facility]b is a 67 year old, presenting for the following health issues:  Derm Problem (Eyelid dermatitis. Pt denies using any products. Her ophthalmologist said she should hot pack her eyes due to clogged tear ducts. )      History of Present Illness       Reason for visit:  Right eye, upper lid, swollen, red for 7 days  Symptom onset:  3-7 days ago  Symptoms include:  R eye-swollen & red  Symptom intensity:  Mild  Symptom progression:  Staying the same  Had these symptoms before:  No  What makes it worse:  Warm washcloth resting on eye    She eats 2-3 servings of fruits and vegetables daily.She consumes 0 sweetened beverage(s) daily.She exercises with enough effort to increase her heart rate 9 or less minutes per day.  She exercises with enough effort to increase her heart rate 3 or less days per week.   She is taking medications regularly.     Patient presents today for evaluation of swelling and redness to her right upper eyelid.  She has some chronic issues with lid irritation, and per her ophthalmologist warm packs her eyes in the morning to increase lubrication she says.  She also uses some rewetting drops.   "About 1 week ago, she started to notice some itching, then stinging and redness in the right upper eyelid.  She then developed what looked like a pustule that ruptured on its own.  Patient then expected the redness to resolve, but it has not.  She will be traveling out of state in 2 days and is worried that she may be developing an infection.  She has never had anything like this in the past.  She has no pain with movement of the eye, no fever, no blurred vision.    Review of Systems   Constitutional, HEENT, cardiovascular, pulmonary, gi and gu systems are negative, except as otherwise noted.      Objective    /80 (BP Location: Left arm, Patient Position: Sitting, Cuff Size: Adult Regular)   Pulse 66   Temp 97.9  F (36.6  C) (Oral)   Ht 1.727 m (5' 8\")   Wt 112 kg (247 lb)   SpO2 95%   BMI 37.56 kg/m    Body mass index is 37.56 kg/m .  Physical Exam   GENERAL: healthy, alert and no distress  EYES: Eyes grossly normal to inspection, PERRL and conjunctivae and sclerae normal; right upper lid with what looks like an ill-defined nodule with overlying erythma; no diffuse swelling, no ophthalmoplegia or drainage  RESP: breathing comfortably, no cough during the visit  CV: regular rate and rhythm, normal S1 S2, no S3 or S4, no murmur, click or rub, no peripheral edema and peripheral pulses strong  MS: no gross musculoskeletal defects noted, no edema  PSYCH: mentation appears normal, affect normal/bright    Diagnostics: None                "

## 2023-08-09 ENCOUNTER — TRANSFERRED RECORDS (OUTPATIENT)
Dept: HEALTH INFORMATION MANAGEMENT | Facility: CLINIC | Age: 68
End: 2023-08-09

## 2023-08-09 ENCOUNTER — ANCILLARY PROCEDURE (OUTPATIENT)
Dept: MAMMOGRAPHY | Facility: CLINIC | Age: 68
End: 2023-08-09
Attending: FAMILY MEDICINE
Payer: COMMERCIAL

## 2023-08-09 DIAGNOSIS — Z12.31 VISIT FOR SCREENING MAMMOGRAM: ICD-10-CM

## 2023-08-09 PROCEDURE — 77067 SCR MAMMO BI INCL CAD: CPT

## 2023-08-22 ASSESSMENT — ACTIVITIES OF DAILY LIVING (ADL): CURRENT_FUNCTION: NO ASSISTANCE NEEDED

## 2023-08-22 ASSESSMENT — ENCOUNTER SYMPTOMS
ABDOMINAL PAIN: 0
JOINT SWELLING: 0
EYE PAIN: 0
MYALGIAS: 0
PARESTHESIAS: 0
COUGH: 0
DIARRHEA: 0
SORE THROAT: 0
NAUSEA: 0
DYSURIA: 0
PALPITATIONS: 0
CHILLS: 0
HEARTBURN: 0
DIZZINESS: 0
FREQUENCY: 0
FEVER: 0
CONSTIPATION: 0
HEADACHES: 1
WEAKNESS: 0
NERVOUS/ANXIOUS: 0
ARTHRALGIAS: 0
HEMATURIA: 0
HEMATOCHEZIA: 0
SHORTNESS OF BREATH: 0
BREAST MASS: 0

## 2023-08-24 PROBLEM — R32 URINARY INCONTINENCE: Status: ACTIVE | Noted: 2023-08-22

## 2023-08-28 ENCOUNTER — OFFICE VISIT (OUTPATIENT)
Dept: FAMILY MEDICINE | Facility: CLINIC | Age: 68
End: 2023-08-28
Payer: COMMERCIAL

## 2023-08-28 VITALS
SYSTOLIC BLOOD PRESSURE: 136 MMHG | BODY MASS INDEX: 38.95 KG/M2 | HEART RATE: 74 BPM | WEIGHT: 257 LBS | OXYGEN SATURATION: 95 % | DIASTOLIC BLOOD PRESSURE: 78 MMHG | RESPIRATION RATE: 18 BRPM | HEIGHT: 68 IN | TEMPERATURE: 98.6 F

## 2023-08-28 DIAGNOSIS — Z13.220 LIPID SCREENING: ICD-10-CM

## 2023-08-28 DIAGNOSIS — R06.83 SNORING: ICD-10-CM

## 2023-08-28 DIAGNOSIS — Z00.00 ENCOUNTER FOR MEDICARE ANNUAL WELLNESS EXAM: Primary | ICD-10-CM

## 2023-08-28 DIAGNOSIS — N39.41 URGE INCONTINENCE OF URINE: ICD-10-CM

## 2023-08-28 DIAGNOSIS — G89.29 CHRONIC PAIN OF BOTH KNEES: ICD-10-CM

## 2023-08-28 DIAGNOSIS — M25.562 CHRONIC PAIN OF BOTH KNEES: ICD-10-CM

## 2023-08-28 DIAGNOSIS — R73.01 ELEVATED FASTING BLOOD SUGAR: ICD-10-CM

## 2023-08-28 DIAGNOSIS — I10 ESSENTIAL HYPERTENSION: ICD-10-CM

## 2023-08-28 DIAGNOSIS — M25.561 CHRONIC PAIN OF BOTH KNEES: ICD-10-CM

## 2023-08-28 DIAGNOSIS — E66.01 CLASS 2 SEVERE OBESITY DUE TO EXCESS CALORIES WITH SERIOUS COMORBIDITY AND BODY MASS INDEX (BMI) OF 39.0 TO 39.9 IN ADULT (H): ICD-10-CM

## 2023-08-28 DIAGNOSIS — E66.812 CLASS 2 SEVERE OBESITY DUE TO EXCESS CALORIES WITH SERIOUS COMORBIDITY AND BODY MASS INDEX (BMI) OF 39.0 TO 39.9 IN ADULT (H): ICD-10-CM

## 2023-08-28 DIAGNOSIS — F32.A DEPRESSION, UNSPECIFIED DEPRESSION TYPE: ICD-10-CM

## 2023-08-28 LAB
ALBUMIN SERPL BCG-MCNC: 4.4 G/DL (ref 3.5–5.2)
ALBUMIN UR-MCNC: NEGATIVE MG/DL
ALP SERPL-CCNC: 70 U/L (ref 35–104)
ALT SERPL W P-5'-P-CCNC: 20 U/L (ref 0–50)
ANION GAP SERPL CALCULATED.3IONS-SCNC: 10 MMOL/L (ref 7–15)
APPEARANCE UR: CLEAR
AST SERPL W P-5'-P-CCNC: 23 U/L (ref 0–45)
BACTERIA #/AREA URNS HPF: ABNORMAL /HPF
BILIRUB SERPL-MCNC: 0.8 MG/DL
BILIRUB UR QL STRIP: NEGATIVE
BUN SERPL-MCNC: 11.1 MG/DL (ref 8–23)
CALCIUM SERPL-MCNC: 9.8 MG/DL (ref 8.8–10.2)
CHLORIDE SERPL-SCNC: 101 MMOL/L (ref 98–107)
CHOLEST SERPL-MCNC: 196 MG/DL
COLOR UR AUTO: YELLOW
CREAT SERPL-MCNC: 0.68 MG/DL (ref 0.51–0.95)
DEPRECATED HCO3 PLAS-SCNC: 28 MMOL/L (ref 22–29)
GFR SERPL CREATININE-BSD FRML MDRD: >90 ML/MIN/1.73M2
GLUCOSE SERPL-MCNC: 123 MG/DL (ref 70–99)
GLUCOSE UR STRIP-MCNC: NEGATIVE MG/DL
HDLC SERPL-MCNC: 41 MG/DL
HGB UR QL STRIP: NEGATIVE
KETONES UR STRIP-MCNC: NEGATIVE MG/DL
LDLC SERPL CALC-MCNC: 127 MG/DL
LEUKOCYTE ESTERASE UR QL STRIP: ABNORMAL
NITRATE UR QL: NEGATIVE
NONHDLC SERPL-MCNC: 155 MG/DL
PH UR STRIP: 7 [PH] (ref 5–8)
POTASSIUM SERPL-SCNC: 4.1 MMOL/L (ref 3.4–5.3)
PROT SERPL-MCNC: 7 G/DL (ref 6.4–8.3)
RBC #/AREA URNS AUTO: ABNORMAL /HPF
SODIUM SERPL-SCNC: 139 MMOL/L (ref 136–145)
SP GR UR STRIP: 1.01 (ref 1–1.03)
SQUAMOUS #/AREA URNS AUTO: ABNORMAL /LPF
TRANS CELLS #/AREA URNS HPF: ABNORMAL /HPF
TRIGL SERPL-MCNC: 142 MG/DL
UROBILINOGEN UR STRIP-ACNC: 0.2 E.U./DL
WBC #/AREA URNS AUTO: ABNORMAL /HPF

## 2023-08-28 PROCEDURE — G0439 PPPS, SUBSEQ VISIT: HCPCS | Performed by: FAMILY MEDICINE

## 2023-08-28 PROCEDURE — 80053 COMPREHEN METABOLIC PANEL: CPT | Performed by: FAMILY MEDICINE

## 2023-08-28 PROCEDURE — 80061 LIPID PANEL: CPT | Performed by: FAMILY MEDICINE

## 2023-08-28 PROCEDURE — 81001 URINALYSIS AUTO W/SCOPE: CPT | Performed by: FAMILY MEDICINE

## 2023-08-28 PROCEDURE — 99214 OFFICE O/P EST MOD 30 MIN: CPT | Mod: 25 | Performed by: FAMILY MEDICINE

## 2023-08-28 PROCEDURE — 36415 COLL VENOUS BLD VENIPUNCTURE: CPT | Performed by: FAMILY MEDICINE

## 2023-08-28 RX ORDER — ATENOLOL 50 MG/1
50 TABLET ORAL DAILY
Qty: 90 TABLET | Refills: 3 | Status: SHIPPED | OUTPATIENT
Start: 2023-08-28 | End: 2024-03-07

## 2023-08-28 RX ORDER — CHLORTHALIDONE 25 MG/1
25 TABLET ORAL DAILY
Qty: 90 TABLET | Refills: 3 | Status: CANCELLED | OUTPATIENT
Start: 2023-08-28

## 2023-08-28 RX ORDER — CITALOPRAM HYDROBROMIDE 20 MG/1
20 TABLET ORAL DAILY
Qty: 90 TABLET | Refills: 3 | Status: SHIPPED | OUTPATIENT
Start: 2023-08-28 | End: 2024-08-30

## 2023-08-28 RX ORDER — LOSARTAN POTASSIUM 25 MG/1
25 TABLET ORAL DAILY
Qty: 30 TABLET | Refills: 3 | Status: SHIPPED | OUTPATIENT
Start: 2023-08-28 | End: 2023-11-22 | Stop reason: DRUGHIGH

## 2023-08-28 ASSESSMENT — ACTIVITIES OF DAILY LIVING (ADL)
CONCENTRATING,_REMEMBERING_OR_MAKING_DECISIONS_DIFFICULTY: NO
DOING_ERRANDS_INDEPENDENTLY_DIFFICULTY: NO
TOILETING_ISSUES: NO
DIFFICULTY_COMMUNICATING: NO
HEARING_DIFFICULTY_OR_DEAF: NO
DRESSING/BATHING_DIFFICULTY: NO
CHANGE_IN_FUNCTIONAL_STATUS_SINCE_ONSET_OF_CURRENT_ILLNESS/INJURY: NO
FALL_HISTORY_WITHIN_LAST_SIX_MONTHS: NO
WEAR_GLASSES_OR_BLIND: YES
DIFFICULTY_EATING/SWALLOWING: NO
WALKING_OR_CLIMBING_STAIRS_DIFFICULTY: NO

## 2023-08-28 ASSESSMENT — ENCOUNTER SYMPTOMS
HEARTBURN: 0
SHORTNESS OF BREATH: 0
DIZZINESS: 0
NAUSEA: 0
NERVOUS/ANXIOUS: 0
WEAKNESS: 0
SORE THROAT: 0
CHILLS: 0
PARESTHESIAS: 0
HEMATURIA: 0
FREQUENCY: 0
DYSURIA: 0
HEADACHES: 1
ARTHRALGIAS: 0
PALPITATIONS: 0
COUGH: 0
JOINT SWELLING: 0
ABDOMINAL PAIN: 0
HEMATOCHEZIA: 0
CONSTIPATION: 0
BREAST MASS: 0
EYE PAIN: 0
MYALGIAS: 0
DIARRHEA: 0
FEVER: 0

## 2023-08-28 ASSESSMENT — PATIENT HEALTH QUESTIONNAIRE - PHQ9
SUM OF ALL RESPONSES TO PHQ QUESTIONS 1-9: 1
10. IF YOU CHECKED OFF ANY PROBLEMS, HOW DIFFICULT HAVE THESE PROBLEMS MADE IT FOR YOU TO DO YOUR WORK, TAKE CARE OF THINGS AT HOME, OR GET ALONG WITH OTHER PEOPLE: NOT DIFFICULT AT ALL
SUM OF ALL RESPONSES TO PHQ QUESTIONS 1-9: 1

## 2023-08-28 ASSESSMENT — PAIN SCALES - GENERAL: PAINLEVEL: NO PAIN (0)

## 2023-08-28 NOTE — PROGRESS NOTES
SUBJECTIVE:   Clarisse is a 68 year old who presents for Preventive Visit.    Medical history includes hypertension, depression, migraine headaches and urinary incontinence.    Blood pressure is controlled without concern. Patient is no concerns about mental health.    She reports urinary incontinence becoming a bigger concern for her. She describes urgency with occasional leakage. She does not have any obvious factor that contributes other than being on diuretic for blood pressure control.    Discuss benefits of physical therapy to help with symptoms. Discussed benefits of changing away from a diuretic for controlling blood pressure. Discussed losartan as an alternative blood pressure medication.    She is going to be evaluated for potential sleep apnea due to history of snoring and fatigue. Patient does also report occasional headache pain.    Reviewed routine health prevention.    Are you in the first 12 months of your Medicare coverage?  No    Have you ever done Advance Care Planning? (For example, a Health Directive, POLST, or a discussion with a medical provider or your loved ones about your wishes): Yes, advance care planning is on file.    Fallen 2 or more times in the past year?: No  Any fall with injury in the past year?: No    Cognitive Screening: Performed. Pass  1) Repeat 3 items: 3/3  2) Clock draw: 100% NORMAL  3) 3 item recall: Recalls 3 objects  Results: 3 items recalled: COGNITIVE IMPAIRMENT LESS LIKELY    Mini-CogTM Copyright S Emily. Licensed by the author for use in [facility]; reprinted with permission ([email or web address]). All rights reserved.      Do you have sleep apnea, excessive snoring or daytime drowsiness? : no diagnosis of sleep apnea, but reports increased snoring and fatigue      Reviewed and updated as needed this visit by clinical staff      Reviewed and updated as needed this visit by Provider    Social History     Tobacco Use     Smoking status: Never     Smokeless tobacco:  Never   Substance Use Topics     Alcohol use: Not on file             8/22/2023     3:58 PM   Alcohol Use   Prescreen: >3 drinks/day or >7 drinks/week? No          No data to display              Do you have a current opioid prescription? No  Do you use any other controlled substances or medications that are not prescribed by a provider? None      Current providers sharing in care for this patient include:  Patient Care Team:  Catarino Nicholson MD as PCP - General (Family Practice)  Catarino Nicholson MD as Assigned PCP    The following health maintenance items are reviewed in Epic and correct as of today:  Health Maintenance   Topic Date Due     ANNUAL REVIEW OF HM ORDERS  Never done     DEPRESSION ACTION PLAN  Never done     COVID-19 Vaccine (6 - Pfizer series) 03/30/2023     MEDICARE ANNUAL WELLNESS VISIT  08/22/2023     INFLUENZA VACCINE (1) 09/01/2023     PHQ-9  02/28/2024     FALL RISK ASSESSMENT  08/28/2024     MAMMO SCREENING  08/09/2025     LIPID  08/22/2027     ADVANCE CARE PLANNING  08/28/2028     COLORECTAL CANCER SCREENING  11/12/2029     DTAP/TDAP/TD IMMUNIZATION (4 - Td or Tdap) 08/17/2030     DEXA  08/26/2036     HEPATITIS C SCREENING  Completed     Pneumococcal Vaccine: 65+ Years  Completed     ZOSTER IMMUNIZATION  Completed     IPV IMMUNIZATION  Aged Out     MENINGITIS IMMUNIZATION  Aged Out               Review of Systems   Constitutional:  Negative for chills and fever.   HENT:  Negative for congestion, ear pain, hearing loss and sore throat.    Eyes:  Negative for pain and visual disturbance.   Respiratory:  Negative for cough and shortness of breath.    Cardiovascular:  Negative for chest pain, palpitations and peripheral edema.   Gastrointestinal:  Negative for abdominal pain, constipation, diarrhea, heartburn, hematochezia and nausea.   Breasts:  Negative for tenderness, breast mass and discharge.   Genitourinary:  Positive for urgency. Negative for dysuria, frequency, genital sores,  "hematuria, pelvic pain, vaginal bleeding and vaginal discharge.   Musculoskeletal:  Negative for arthralgias, joint swelling and myalgias.   Skin:  Negative for rash.   Neurological:  Positive for headaches. Negative for dizziness, weakness and paresthesias.   Psychiatric/Behavioral:  Negative for mood changes. The patient is not nervous/anxious.      Complete review of systems is obtained.  Other than the specific considerations noted above complete review of systems is negative.      OBJECTIVE:   /78   Pulse 74   Temp 98.6  F (37  C)   Resp 18   Ht 1.727 m (5' 8\")   Wt 116.6 kg (257 lb)   SpO2 95%   BMI 39.08 kg/m   Estimated body mass index is 39.08 kg/m  as calculated from the following:    Height as of this encounter: 1.727 m (5' 8\").    Weight as of this encounter: 116.6 kg (257 lb).  Physical Exam          General Appearance:    Alert, cooperative, no distress   Eyes:   No scleral icterus or conjunctival irritation       Ears:    Normal TM's and external ear canals, both ears   Throat:   Lips, mucosa, and tongue normal; teeth and gums normal   Neck:   Supple, symmetrical, trachea midline, no adenopathy;        thyroid:  No enlargement/tenderness/nodules   Lungs:     Clear to auscultation bilaterally, respirations unlabored, no wheezes or crackles   Heart:    Regular rate and rhythm,  No murmur   Abdomen:    Soft, no distention, no tenderness on palpation, no masses, no organomegaly     Extremities:  No edema, no joint swelling or redness, no evidence of any injuries   Skin:  No concerning skin findings, no suspicious moles, no rashes   Neurologic:  On gross examination there is no motor or sensory deficit.  Patient walks with a normal gait             ASSESSMENT / PLAN:   Clarisse was seen today for wellness visit, incontinence and snoring.    Diagnoses and all orders for this visit:    Encounter for Medicare annual wellness exam  -     Lipid panel reflex to direct LDL Fasting  -     Comprehensive " "metabolic panel (BMP + Alb, Alk Phos, ALT, AST, Total. Bili, TP)    Essential hypertension  -     Comprehensive metabolic panel (BMP + Alb, Alk Phos, ALT, AST, Total. Bili, TP)  -     atenolol (TENORMIN) 50 MG tablet; Take 1 tablet (50 mg) by mouth daily  -     losartan (COZAAR) 25 MG tablet; Take 1 tablet (25 mg) by mouth daily    Lipid screening  -     Lipid panel reflex to direct LDL Fasting    Depression, unspecified depression type  -     citalopram (CELEXA) 20 MG tablet; Take 1 tablet (20 mg) by mouth daily    Elevated fasting blood sugar  -     Hemoglobin A1c    Urge incontinence of urine  -     UA Macroscopic with reflex to Microscopic and Culture - Lab Collect  -     Physical Therapy Referral; Future  -     UA Microscopic with Reflex to Culture    Snoring  -     Adult Sleep Eval & Management  Referral; Future    Class 2 severe obesity due to excess calories with serious comorbidity and body mass index (BMI) of 39.0 to 39.9 in adult (H)    Chronic pain of both knees  -     Physical Therapy Referral; Future    Other orders  -     Extra Tube           Patient has been advised of split billing requirements and indicates understanding: Yes    Answers submitted by the patient for this visit:  Patient Health Questionnaire (Submitted on 8/28/2023)  If you checked off any problems, how difficult have these problems made it for you to do your work, take care of things at home, or get along with other people?: Not difficult at all  PHQ9 TOTAL SCORE: 1  Annual Preventive Visit (Submitted on 8/22/2023)  Chief Complaint: Annual Exam:  In general, how would you rate your overall physical health?: good  Frequency of exercise:: 1 day/week  Do you usually eat at least 4 servings of fruit and vegetables a day, include whole grains & fiber, and avoid regularly eating high fat or \"junk\" foods? : Yes  Taking medications regularly:: Yes  Medication side effects:: None  Activities of Daily Living: no assistance " "needed  Home safety: lack of grab bars in the bathroom  Hearing Impairment:: difficulty understanding soft or whispered speech  In the past 6 months, have you been bothered by leaking of urine?: Yes  In general, how would you rate your overall mental or emotional health?: excellent  Additional concerns today:: No  Exercise outside of work (Submitted on 8/22/2023)  Chief Complaint: Annual Exam:  Duration of exercise:: Less than 15 minutes    COUNSELING:  Reviewed preventive health counseling, as reflected in patient instructions       Healthy diet/nutrition       Vision screening       Dental care       Colon cancer screening      BMI:   Estimated body mass index is 39.08 kg/m  as calculated from the following:    Height as of this encounter: 1.727 m (5' 8\").    Weight as of this encounter: 116.6 kg (257 lb).   Weight management plan: Discussed healthy diet and exercise guidelines      She reports that she has never smoked. She has never used smokeless tobacco.      Appropriate preventive services were discussed with this patient, including applicable screening as appropriate for cardiovascular disease, diabetes, osteopenia/osteoporosis, and glaucoma.  As appropriate for age/gender, discussed screening for colorectal cancer, prostate cancer, breast cancer, and cervical cancer. Checklist reviewing preventive services available has been given to the patient.    Reviewed patients plan of care and provided an AVS. The Basic Care Plan (routine screening as documented in Health Maintenance) for Clarisse meets the Care Plan requirement. This Care Plan has been established and reviewed with the Patient.          Catarino Nicholson MD, MD  [facility]    Identified Health Risks:    "

## 2023-09-06 ENCOUNTER — THERAPY VISIT (OUTPATIENT)
Dept: PHYSICAL THERAPY | Facility: REHABILITATION | Age: 68
End: 2023-09-06
Attending: FAMILY MEDICINE
Payer: COMMERCIAL

## 2023-09-06 DIAGNOSIS — M25.561 CHRONIC PAIN OF BOTH KNEES: Primary | ICD-10-CM

## 2023-09-06 DIAGNOSIS — G89.29 CHRONIC PAIN OF BOTH KNEES: Primary | ICD-10-CM

## 2023-09-06 DIAGNOSIS — M25.562 CHRONIC PAIN OF BOTH KNEES: Primary | ICD-10-CM

## 2023-09-06 PROCEDURE — 97110 THERAPEUTIC EXERCISES: CPT | Mod: GP | Performed by: PHYSICAL THERAPIST

## 2023-09-06 PROCEDURE — 97535 SELF CARE MNGMENT TRAINING: CPT | Mod: GP | Performed by: PHYSICAL THERAPIST

## 2023-09-06 PROCEDURE — 97161 PT EVAL LOW COMPLEX 20 MIN: CPT | Mod: GP | Performed by: PHYSICAL THERAPIST

## 2023-09-06 NOTE — PROGRESS NOTES
PHYSICAL THERAPY EVALUATION  Type of Visit: Evaluation    See electronic medical record for Abuse and Falls Screening details.    Subjective       Presenting condition or subjective complaint:    Date of onset: 08/28/23    Relevant medical history:     Dates & types of surgery:      Prior diagnostic imaging/testing results:       Prior therapy history for the same diagnosis, illness or injury:        Patient reports 10 years ago rolled left ankle walking on side walk and fell onto right knee and resulted in fracture, and had PT afterwards but has continued to have weakness in both knees since (right feels weaker than left). Difficulty with descending stairs. Patient has laundry in basement,  carries laundry basket down stairs, patient uses railing and sometimes walking side ways and holding railings, and more concerns with descending stairs. Has garden at home. 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     Prior Level of Function  Patient was able to navigate stairs reciprocally greater than 1 year ago    Hobbies/Interests:  [place] home, garden    Patient goals for therapy:  walk without fear of falling     Objective   KNEE EVALUATION  PAIN:   INTEGUMENTARY (edema, incisions):   POSTURE:   GAIT:  Weightbearing Status:   Assistive Device(s):   Gait Deviations: WNL  BALANCE/PROPRIOCEPTION: Single Leg Stance Eyes Open (seconds): 1-2 seconds B  WEIGHTBEARING ALIGNMENT:   NON-WEIGHTBEARING ALIGNMENT:   ROM: AROM WFL    STRENGTH: WFL    30 second sit to stand: 7 reps    Assessment & Plan   CLINICAL IMPRESSIONS  Medical Diagnosis: M25.561, M25.562, G89.29 (ICD-10-CM) - Chronic pain of both knees    Treatment Diagnosis: functional lower extremity weakness, impairments with balance   Impression/Assessment: Patient is a 68 year old female with chief complaints of functional ower extremity weakness and balance  concerns.  The following significant findings have been identified: Pain, Decreased strength, Impaired balance, Impaired muscle performance, and Decreased activity tolerance. These impairments interfere with their ability to perform household mobility and community mobility as compared to previous level of function. Patient will benefit from skilled physical therapy to address impairments and functional limitations in order to achieve their goals.       Clinical Decision Making (Complexity):  Clinical Presentation: Stable/Uncomplicated  Clinical Presentation Rationale: based on medical and personal factors listed in PT evaluation  Clinical Decision Making (Complexity): Low complexity    PLAN OF CARE  Treatment Interventions:  Interventions: Gait Training, Manual Therapy, Neuromuscular Re-education, Therapeutic Activity, Therapeutic Exercise, Self-Care/Home Management    Long Term Goals     PT Goal 1  Goal Identifier: HEP  Goal Description: Patient will demonstrate >50% compliance with home exercise program to promote independence and progress towards  Target Date: 10/18/23  PT Goal 2  Goal Identifier: 30 sec STS  Goal Description: Patient will achieve a score of 9 or greater repetitions in 30 second sit to stand test to reflect normative trunk and lower extremity strength values.  Target Date: 11/29/23  PT Goal 3  Goal Identifier: single leg balance  Goal Description: Patient will improve single leg balance to at least 10 seconds bilaterally to reduce falls risk and improve balance confidence  Target Date: 11/29/23      Frequency of Treatment: weekly  Duration of Treatment: 12 weeks    Recommended Referrals to Other Professionals:   Education Assessment:   Learner/Method: Patient    Risks and benefits of evaluation/treatment have been explained.   Patient/Family/caregiver agrees with Plan of Care.     Evaluation Time:             Signing Clinician: Refugio Smith, PT      [facility]                                                                                    OUTPATIENT PHYSICAL THERAPY      PLAN OF TREATMENT FOR OUTPATIENT REHABILITATION   Patient's Last Name, First Name, Clarisse Mix YOB: 1955   Provider's Name   [facility]   Medical Record No.  [id number]     Onset Date: 08/28/23  Start of Care Date: 09/06/23     Medical Diagnosis:  M25.561, M25.562, G89.29 (ICD-10-CM) - Chronic pain of both knees      PT Treatment Diagnosis:  functional lower extremity weakness, impairments with balance Plan of Treatment  Frequency/Duration: weekly/ 12 weeks    Certification date from 09/06/23 to 11/29/23         See note for plan of treatment details and functional goals     Refugio Smith, PT                         I CERTIFY THE NEED FOR THESE SERVICES FURNISHED UNDER        THIS PLAN OF TREATMENT AND WHILE UNDER MY CARE     (Physician attestation of this document indicates review and certification of the therapy plan).                Referring Provider:  Catarino Nicholson      Initial Assessment  See Epic Evaluation- Start of Care Date: 09/06/23

## 2023-09-25 ENCOUNTER — THERAPY VISIT (OUTPATIENT)
Dept: PHYSICAL THERAPY | Facility: REHABILITATION | Age: 68
End: 2023-09-25
Payer: COMMERCIAL

## 2023-09-25 DIAGNOSIS — G89.29 CHRONIC PAIN OF BOTH KNEES: Primary | ICD-10-CM

## 2023-09-25 DIAGNOSIS — I10 ESSENTIAL HYPERTENSION: ICD-10-CM

## 2023-09-25 DIAGNOSIS — M25.561 CHRONIC PAIN OF BOTH KNEES: Primary | ICD-10-CM

## 2023-09-25 DIAGNOSIS — M25.562 CHRONIC PAIN OF BOTH KNEES: Primary | ICD-10-CM

## 2023-09-25 PROCEDURE — 97110 THERAPEUTIC EXERCISES: CPT | Mod: GP | Performed by: PHYSICAL THERAPIST

## 2023-09-25 RX ORDER — LOSARTAN POTASSIUM 25 MG/1
25 TABLET ORAL DAILY
Qty: 30 TABLET | Refills: 3 | OUTPATIENT
Start: 2023-09-25

## 2023-09-25 NOTE — TELEPHONE ENCOUNTER
Pending Prescriptions:                       Disp   Refills    losartan (COZAAR) 25 MG tablet            30 tab*3            Sig: Take 1 tablet (25 mg) by mouth daily

## 2023-10-02 ENCOUNTER — OFFICE VISIT (OUTPATIENT)
Dept: FAMILY MEDICINE | Facility: CLINIC | Age: 68
End: 2023-10-02
Payer: COMMERCIAL

## 2023-10-02 VITALS
OXYGEN SATURATION: 94 % | SYSTOLIC BLOOD PRESSURE: 163 MMHG | TEMPERATURE: 98.3 F | DIASTOLIC BLOOD PRESSURE: 94 MMHG | HEART RATE: 63 BPM | RESPIRATION RATE: 16 BRPM

## 2023-10-02 DIAGNOSIS — J01.90 ACUTE SINUSITIS WITH SYMPTOMS > 10 DAYS: Primary | ICD-10-CM

## 2023-10-02 PROCEDURE — 99213 OFFICE O/P EST LOW 20 MIN: CPT | Performed by: PHYSICIAN ASSISTANT

## 2023-10-03 NOTE — PROGRESS NOTES
Chief Complaint   Patient presents with    Sinus Problem     Has sinus pressure headache off and on for 3 weeks  feels fatigued       ASSESSMENT/PLAN:  Clarisse was seen today for sinus problem.    Diagnoses and all orders for this visit:    Acute sinusitis with symptoms > 10 days  -     amoxicillin-clavulanate (AUGMENTIN) 875-125 MG tablet; Take 1 tablet by mouth 2 times daily for 7 days    Likely bacterial given length of symptoms.  Start on Augmentin above.  Follow-up with any new or worsening symptoms develop or failing to improve within the next 3 to 5 days    Wu Anguiano PA-C      SUBJECTIVE:  Clarisse is a 68 year old female who presents to urgent care with 3 weeks of on and off headache and pressure between her eyes, forehead and the maxillary sinuses.  Has been feeling fatigued and sleeping a lot more.  Will have some nasal congestion and drainage as well.  No cough, has some chills.    ROS: Pertinent ROS neg other than the symptoms noted above in the HPI.     OBJECTIVE:  BP (!) 163/94   Pulse 63   Temp 98.3  F (36.8  C) (Oral)   Resp 16   SpO2 94%    GENERAL: healthy, alert and no distress  EYES: Eyes grossly normal to inspection, PERRL and conjunctivae and sclerae normal  HENT: Tympanic membranes flat bilaterally with opaque effusion, nose and mouth without ulcers or lesions, frontal sinus tenderness, nasal mucosa erythema    DIAGNOSTICS    No results found for any visits on 10/02/23.     Current Outpatient Medications   Medication    atenolol (TENORMIN) 50 MG tablet    citalopram (CELEXA) 20 MG tablet    fluticasone (FLONASE) 50 MCG/ACT nasal spray    losartan (COZAAR) 25 MG tablet     No current facility-administered medications for this visit.      Patient Active Problem List   Diagnosis    Major depression in partial remission (H24)    Migraine Headache    Joint Pain, Localized In The Knee    Hypertension    Sinusitis    Benign neoplasm of descending colon    Diverticular disease of large intestine     Polyp of colon    Allergic rhinitis    Urinary incontinence    Class 2 severe obesity due to excess calories with serious comorbidity in adult (H)    Chronic pain of both knees      No past medical history on file.  Past Surgical History:   Procedure Laterality Date    RELEASE CARPAL TUNNEL  06/2016    RELEASE TRIGGER FINGER  06/2016     Family History   Problem Relation Age of Onset    Breast Cancer Maternal Aunt      Social History     Tobacco Use    Smoking status: Never    Smokeless tobacco: Never   Substance Use Topics    Alcohol use: Not on file              The plan of care was discussed with the patient. They understand and agree with the course of treatment prescribed. A printed summary was given including instructions and medications.  The use of Dragon/[employer] dictation services may have been used to construct the content in this note; any grammatical or spelling errors are non-intentional. Please contact the author of this note directly if you are in need of any clarification.

## 2023-10-03 NOTE — PATIENT INSTRUCTIONS

## 2023-10-09 ENCOUNTER — OFFICE VISIT (OUTPATIENT)
Dept: FAMILY MEDICINE | Facility: CLINIC | Age: 68
End: 2023-10-09
Payer: COMMERCIAL

## 2023-10-09 VITALS
WEIGHT: 254 LBS | DIASTOLIC BLOOD PRESSURE: 82 MMHG | TEMPERATURE: 98.1 F | BODY MASS INDEX: 38.49 KG/M2 | SYSTOLIC BLOOD PRESSURE: 148 MMHG | RESPIRATION RATE: 18 BRPM | OXYGEN SATURATION: 95 % | HEIGHT: 68 IN | HEART RATE: 67 BPM

## 2023-10-09 DIAGNOSIS — J32.9 SINUSITIS, UNSPECIFIED CHRONICITY, UNSPECIFIED LOCATION: ICD-10-CM

## 2023-10-09 DIAGNOSIS — I10 ESSENTIAL HYPERTENSION: Primary | ICD-10-CM

## 2023-10-09 PROCEDURE — 99214 OFFICE O/P EST MOD 30 MIN: CPT | Performed by: FAMILY MEDICINE

## 2023-10-09 RX ORDER — LOSARTAN POTASSIUM 50 MG/1
50 TABLET ORAL DAILY
Qty: 90 TABLET | Refills: 3 | Status: SHIPPED | OUTPATIENT
Start: 2023-10-09 | End: 2024-06-06

## 2023-10-09 ASSESSMENT — PAIN SCALES - GENERAL: PAINLEVEL: NO PAIN (0)

## 2023-10-09 NOTE — PROGRESS NOTES
"Clarisse Romero  BP (!) 148/82   Pulse 67   Temp 98.1  F (36.7  C)   Resp 18   Ht 1.727 m (5' 8\")   Wt 115.2 kg (254 lb)   SpO2 95%   BMI 38.62 kg/m       Assessment/Plan:                Clarisse was seen today for recheck medication and hypertension.    Diagnoses and all orders for this visit:    Essential hypertension  -     losartan (COZAAR) 50 MG tablet; Take 1 tablet (50 mg) by mouth daily    Sinusitis, unspecified chronicity, unspecified location  -     amoxicillin-clavulanate (AUGMENTIN) 875-125 MG tablet; Take 1 tablet by mouth 2 times daily         DISCUSSION  see discussion below and ordered above.  Subjective:     HPI:    Clarisse Romero is a 68 year old female is here today to follow-up on sinus concerns and elevated blood pressure.    Blood pressure is higher than ideal and current regiment. Discussed increasing losartan 50 mg daily. Reviewed recent laboratory testing.    She has sinusitis. She was initiated on a seven day course of treatment and has not had complete resolution of symptoms although notes improvement. No symptoms to warrant action such as imaging or other but it is clear she would benefit from additional antibiotic therapy.    ROS:  Complete review of systems is obtained.  Other than the specific considerations noted above complete review of systems is negative.          Objective:   Medications:  Current Outpatient Medications   Medication    amoxicillin-clavulanate (AUGMENTIN) 875-125 MG tablet    atenolol (TENORMIN) 50 MG tablet    citalopram (CELEXA) 20 MG tablet    fluticasone (FLONASE) 50 MCG/ACT nasal spray    losartan (COZAAR) 25 MG tablet    losartan (COZAAR) 50 MG tablet     No current facility-administered medications for this visit.        Allergies:   No Known Allergies     Social History     Socioeconomic History    Marital status:      Spouse name: Not on file    Number of children: Not on file    Years of education: Not on file    Highest education level: Not on " file   Occupational History    Not on file   Tobacco Use    Smoking status: Never    Smokeless tobacco: Never   Vaping Use    Vaping Use: Never used   Substance and Sexual Activity    Alcohol use: Not on file    Drug use: Not on file    Sexual activity: Not on file   Other Topics Concern    Not on file   Social History Narrative    Not on file     Social Determinants of Health     Financial Resource Strain: Low Risk  (10/9/2023)    Financial Resource Strain     Within the past 12 months, have you or your family members you live with been unable to get utilities (heat, electricity) when it was really needed?: No   Food Insecurity: Low Risk  (10/9/2023)    Food Insecurity     Within the past 12 months, did you worry that your food would run out before you got money to buy more?: No     Within the past 12 months, did the food you bought just not last and you didn t have money to get more?: No   Transportation Needs: Low Risk  (10/9/2023)    Transportation Needs     Within the past 12 months, has lack of transportation kept you from medical appointments, getting your medicines, non-medical meetings or appointments, work, or from getting things that you need?: No   Physical Activity: Not on file   Stress: Not on file   Social Connections: Not on file   Interpersonal Safety: Not on file   Housing Stability: Low Risk  (10/9/2023)    Housing Stability     Do you have housing? : Yes     Are you worried about losing your housing?: No       Family History   Problem Relation Age of Onset    Breast Cancer Maternal Aunt         Most Recent Immunizations   Administered Date(s) Administered    COVID-19 12+ (2023-24) (MODERNA) 09/26/2023    COVID-19 Bivalent 12+ (Pfizer) 11/30/2022    COVID-19 MONOVALENT 12+ (Pfizer) 10/22/2021    COVID-19 Monovalent 12+ (Pfizer 2022) 04/18/2022    DT (PEDS <7y) 04/07/2000    FLU 6-35 months 09/17/2010    Flu, Unspecified 10/19/2022    Influenza (H1N1) 12/22/2009    Influenza (High Dose) 3 valent  "vaccine 10/23/2018    Influenza (IIV3) PF 09/30/2013    Influenza Vaccine 18-64 (Flublok) 10/09/2019    Influenza Vaccine 65+ (Fluzone HD) 10/24/2022    Influenza Vaccine >6 months (Alfuria,Fluzone) 10/03/2017    Influenza Vaccine, 6+MO IM (QUADRIVALENT W/PRESERVATIVES) 09/29/2016    Pneumo Conj 13-V (2010&after) 08/13/2019    Pneumococcal 23 valent 08/17/2020    TD,PF 7+ (Tenivac) 08/17/2020    TDAP (Adacel,Boostrix) 10/15/2010    Td, Absorbed, Pf, Adult, Lf Unspecified 08/17/2020    Zoster recombinant adjuvanted (SHINGRIX) 11/22/2020    Zoster vaccine, live 08/21/2013        Wt Readings from Last 3 Encounters:   10/09/23 115.2 kg (254 lb)   08/28/23 116.6 kg (257 lb)   01/11/23 112 kg (247 lb)        BP Readings from Last 6 Encounters:   10/09/23 (!) 148/82   10/02/23 (!) 163/94   08/28/23 136/78   01/11/23 134/80   08/22/22 134/76   08/16/21 128/76          PHYSICAL EXAM:    BP (!) 148/82   Pulse 67   Temp 98.1  F (36.7  C)   Resp 18   Ht 1.727 m (5' 8\")   Wt 115.2 kg (254 lb)   SpO2 95%   BMI 38.62 kg/m           General Appearance:    Alert, cooperative, no distress   Eyes:   No scleral icterus or conjunctival irritation       Ears:    Normal TM's and external ear canals, both ears   Throat:   Lips, mucosa, and tongue normal; teeth and gums normal   Neck:   Supple, symmetrical, trachea midline, no adenopathy;        thyroid:  No enlargement/tenderness/nodules   Lungs:     Clear to auscultation bilaterally, respirations unlabored, no wheezes or crackles   Heart:    Regular rate and rhythm,  No murmur   Extremities:  No edema, no joint swelling or redness, no evidence of any injuries   Skin:  No concerning skin findings, no suspicious moles, no rashes   Neurologic:  On gross examination there is no motor or sensory deficit.  Patient walks with a normal gait           Answers submitted by the patient for this visit:  Hypertension Visit (Submitted on 10/9/2023)  Chief Complaint: Chronic problems general " questions HPI Form  Do you check your blood pressure regularly outside of the clinic?: Yes  Are your blood pressures ever more than 140 on the top number (systolic) OR more than 90 on the bottom number (diastolic)? (For example, greater than 140/90): Yes  Are you following a low salt diet?: Yes  General Questionnaire (Submitted on 10/9/2023)  Chief Complaint: Chronic problems general questions HPI Form  How many servings of fruits and vegetables do you eat daily?: 2-3  On average, how many sweetened beverages do you drink each day (Examples: soda, juice, sweet tea, etc.  Do NOT count diet or artificially sweetened beverages)?: 0  How many minutes a day do you exercise enough to make your heart beat faster?: 9 or less  How many days a week do you exercise enough to make your heart beat faster?: 3 or less  How many days per week do you miss taking your medication?: 0

## 2023-10-20 ENCOUNTER — MYC MEDICAL ADVICE (OUTPATIENT)
Dept: FAMILY MEDICINE | Facility: CLINIC | Age: 68
End: 2023-10-20
Payer: COMMERCIAL

## 2023-10-20 DIAGNOSIS — J32.9 SINUSITIS, UNSPECIFIED CHRONICITY, UNSPECIFIED LOCATION: Primary | ICD-10-CM

## 2023-10-20 DIAGNOSIS — R51.9 FRONTAL HEADACHE: ICD-10-CM

## 2023-10-24 RX ORDER — DOXYCYCLINE 100 MG/1
100 CAPSULE ORAL 2 TIMES DAILY
Qty: 20 CAPSULE | Refills: 0 | Status: SHIPPED | OUTPATIENT
Start: 2023-10-24 | End: 2023-11-22

## 2023-11-21 ENCOUNTER — NURSE TRIAGE (OUTPATIENT)
Dept: FAMILY MEDICINE | Facility: CLINIC | Age: 68
End: 2023-11-21
Payer: COMMERCIAL

## 2023-11-21 NOTE — TELEPHONE ENCOUNTER
"See Dr. Nicholson's response to nurse triage on 11/21/23:    \"I think it is okay to wait till tomorrow for scheduled appointment to be seen and evaluated. If patient feels there is significant worsening of her symptoms in a short period of time she should seek evaluation in the emergency department.\"    Writer called and relayed the above information to the patient    Provider Recommendation Follow Up:   Reached patient/caregiver. Informed of provider's recommendations. Patient verbalized understanding and agrees with the plan.          Denies other questions or concerns at this time.    Giuliana Galarza, RN, BSN  [facility]    "

## 2023-11-21 NOTE — TELEPHONE ENCOUNTER
"Nurse Triage SBAR    Is this a 2nd Level Triage? YES, LICENSED PRACTITIONER REVIEW IS REQUIRED    Situation:   Telephone call into the clinic from the patient on 11/21/23 to report ongoing sinus symptoms    Background:   Hx of allergic rhinitis, migraines, and sinus infections    See office visit notes from 10/2/23 at [facility] from Wu Anguiano PA-C    Also see office visit notes from Dr. Nicholson on 10/9/23    Assessment:   Patient reports ongoing sinus symptoms and headache for several weeks-seems to wax and wane    States she has been treated with 2 rounds of Augmentin, then treated with a round of doxycycline, but she is still feeling very congested and painful    Doxycycline was the antibiotic that seemed to help the best because she was able to \"drain\" her sinuses somewhat, but now she is fully congested-finished the doxycycline around 11/3/23     Breaths through her mouth at night, but can breathe a little during the day-\"has to work hard\" at breathing through her nose during the day    Has been taking 400 mg of ibuprofen BID, as well as Tylenol, without much relief from sinus pain/pressure    Tried different decongestants, but none have been effective    Wakes up with \"deep\" headaches    Headache is between her eye brows, behind her eyes and around her eyes    Headache is constant throughout the day and she has a hard getting to sleep when she has a headache    Very fatigued and has a loss of smell    Reports she had stomach \"flu\" over the last weekend on Sunday, 11/19/23, and she vomited three times    No diarrhea or vomiting since Sunday    Able to eat and drink ok  and able to take meds as prescribed since Sunday    Denies cough or sore throat    No chest pain, trouble breathing, dizziness, lightheadedness, weakness or numbness on either side of the body, swelling of the lower extremities, vomiting, diarrhea, or fever    Protocol Recommended Disposition:   Go To Office Now    Recommendation:   Gave " "care advice. Recommended the patient be seen in office for evaluation right away, but there are no available appointments today, 11/21/23.    Patient verbalized understanding.    Writer assisted the patient with scheduling an appointment in-clinic on 11/22/23, Wednesday, with Dr. Patel at 10:50 am, to recheck the patient's sinuses    Will route to the PCP to review if it is appropriate for the patient to wait to be seen tomorrow or if the patient should go to urgent care today, 11/21/23    Routed to provider    Does the patient meet one of the following criteria for ADS visit consideration? 16+ years old, with an [facility] PCP     TIP  Providers, please consider if this condition is appropriate for management at one of our Acute and Diagnostic Services sites.     If patient is a good candidate, please use dotphrase <dot>triageresponse and select Refer to ADS to document.     Reason for Disposition   Redness or swelling on the cheek, forehead, or around the eye    Additional Information   Negative: Sounds like a life-threatening emergency to the triager   Negative: Difficulty breathing, and not from stuffy nose (e.g., not relieved by cleaning out the nose)   Negative: SEVERE headache and has fever   Negative: Patient sounds very sick or weak to the triager   Negative: SEVERE sinus pain   Negative: SEVERE headache    Answer Assessment - Initial Assessment Questions  1. LOCATION: \"Where does it hurt?\"         Headache and pain below her eyes, between her eyebrows and behind eyes    2. ONSET: \"When did the sinus pain start?\"  (e.g., hours, days)         Sinus pain has been going on for weeks-got better with last RX of doxycycline-was able to drain her sinuses-felt ok for a few days after the doxy, but now the pain is back    3. SEVERITY: \"How bad is the pain?\"   (Scale 1-10; mild, moderate or severe)    - MILD (1-3): doesn't interfere with normal activities     - MODERATE (4-7): interferes with normal activities (e.g., " "work or school) or awakens from sleep    - SEVERE (8-10): excruciating pain and patient unable to do any normal activities           Rated pain as a 4 out of ten; ibuprofen and Tylenol does not help    Gets worse throughout the day    4. RECURRENT SYMPTOM: \"Have you ever had sinus problems before?\" If Yes, ask: \"When was the last time?\" and \"What happened that time?\"         Yes    5. NASAL CONGESTION: \"Is the nose blocked?\" If Yes, ask: \"Can you open it or must you breathe through your mouth?\"        Breaths through her mouth at night, but can breathe a little during the day-\"has to work hard\" at breathing through her nose during th day    6. NASAL DISCHARGE: \"Do you have discharge from your nose?\" If so ask, \"What color?\"        No discharge-just congested    7. FEVER: \"Do you have a fever?\" If Yes, ask: \"What is it, how was it measured, and when did it start?\"         No fever known      8. OTHER SYMPTOMS: \"Do you have any other symptoms?\" (e.g., sore throat, cough, earache, difficulty breathing)        None noted      9. PREGNANCY: \"Is there any chance you are pregnant?\" \"When was your last menstrual period?\"        No chance of pregnancy    Protocols used: Sinus Pain or Congestion-A-[place]    Giuliana Galarza RN, BSN  [facility]    "

## 2023-11-21 NOTE — TELEPHONE ENCOUNTER
I think it is okay to wait till tomorrow for scheduled appointment to be seen and evaluated. If patient feels there is significant worsening of her symptoms in a short period of time she should seek evaluation in the emergency department.

## 2023-11-22 ENCOUNTER — OFFICE VISIT (OUTPATIENT)
Dept: FAMILY MEDICINE | Facility: CLINIC | Age: 68
End: 2023-11-22
Payer: COMMERCIAL

## 2023-11-22 VITALS
RESPIRATION RATE: 18 BRPM | TEMPERATURE: 98.5 F | HEIGHT: 68 IN | HEART RATE: 66 BPM | DIASTOLIC BLOOD PRESSURE: 82 MMHG | WEIGHT: 250.9 LBS | SYSTOLIC BLOOD PRESSURE: 136 MMHG | BODY MASS INDEX: 38.03 KG/M2 | OXYGEN SATURATION: 95 %

## 2023-11-22 DIAGNOSIS — J01.90 ACUTE SINUSITIS TREATED WITH ANTIBIOTICS IN THE PAST 60 DAYS: Primary | ICD-10-CM

## 2023-11-22 DIAGNOSIS — N30.01 ACUTE CYSTITIS WITH HEMATURIA: ICD-10-CM

## 2023-11-22 LAB
ALBUMIN UR-MCNC: 100 MG/DL
APPEARANCE UR: CLEAR
BACTERIA #/AREA URNS HPF: ABNORMAL /HPF
BILIRUB UR QL STRIP: ABNORMAL
COLOR UR AUTO: YELLOW
GLUCOSE UR STRIP-MCNC: NEGATIVE MG/DL
HGB UR QL STRIP: ABNORMAL
KETONES UR STRIP-MCNC: NEGATIVE MG/DL
LEUKOCYTE ESTERASE UR QL STRIP: ABNORMAL
NITRATE UR QL: NEGATIVE
PH UR STRIP: 6.5 [PH] (ref 5–8)
RBC #/AREA URNS AUTO: ABNORMAL /HPF
SP GR UR STRIP: 1.01 (ref 1–1.03)
SQUAMOUS #/AREA URNS AUTO: ABNORMAL /LPF
TRANS CELLS #/AREA URNS HPF: ABNORMAL /HPF
UROBILINOGEN UR STRIP-ACNC: 1 E.U./DL
WBC #/AREA URNS AUTO: ABNORMAL /HPF
WBC CLUMPS #/AREA URNS HPF: PRESENT /HPF

## 2023-11-22 PROCEDURE — 87086 URINE CULTURE/COLONY COUNT: CPT | Performed by: FAMILY MEDICINE

## 2023-11-22 PROCEDURE — 99214 OFFICE O/P EST MOD 30 MIN: CPT | Performed by: FAMILY MEDICINE

## 2023-11-22 PROCEDURE — 81001 URINALYSIS AUTO W/SCOPE: CPT | Performed by: FAMILY MEDICINE

## 2023-11-22 RX ORDER — LEVOFLOXACIN 500 MG/1
500 TABLET, FILM COATED ORAL DAILY
Qty: 10 TABLET | Refills: 0 | Status: SHIPPED | OUTPATIENT
Start: 2023-11-22 | End: 2023-12-02

## 2023-11-22 ASSESSMENT — PAIN SCALES - GENERAL: PAINLEVEL: MODERATE PAIN (5)

## 2023-11-22 NOTE — PATIENT INSTRUCTIONS

## 2023-11-22 NOTE — PROGRESS NOTES
Assessment & Plan     Acute sinusitis treated with antibiotics in the past 60 days  Inadequate resolution with 2 prior courses of antibiotics with Augmentin and then doxycycline.  Discussed importance of imaging with CT scan, this is reordered today.  We will treat with Levaquin as a respiratory fluoroquinolone.  Discussed common side effects.  With persistent infection, advised 10-day course.  Notify with inadequate effect or worsening.  - levofloxacin (LEVAQUIN) 500 MG tablet; Take 1 tablet (500 mg) by mouth daily for 10 days  - CT Sinus w/o Contrast; Future    Acute cystitis with hematuria  Urinalysis suggestive of infection.  Will await culture.  Levofloxacin should cover UTI.  - UA Macroscopic with reflex to Microscopic and Culture - Lab Collect  - Urine Microscopic Exam  - Urine Culture  - levofloxacin (LEVAQUIN) 500 MG tablet; Take 1 tablet (500 mg) by mouth daily for 10 days                   Bertha Patel MD  [facility]    Oziel Robb is a 68 year old, presenting for the following health issues:  Sinus Problem (Continued headache, tender cheek bones, facial pressure. No headache day) and UTI (This morning had burning with urination and blood on tissue. )      Acute sinusitis early October treated with prolonged course of Augmentin with initial improvement, recurrence treated with doxycycline on 10/20/2023.  This worked well, she drained quite a bit of thick mucus and felt better for 5 to 7 days but then symptoms returned with frontal central headache sometimes rating into her eyes bilaterally, thick postnasal drainage, feeling more rundown.  Continues to use Flonase consistently.  Over-the-counter antihistamine not helpful.  No fevers or chills.  No acute neurologic symptoms.  Persisting headache sometimes better when she lays down, sometimes improved when she lays down.  3 days ago she had viral gastroenteritis with vomiting, that is since resolved and she is feeling better  "from that.  Noting that she had onset of dysuria last night, slight pink tinge to urine this morning.    Sinus Problem     UTI    History of Present Illness       Reason for visit:  Headache and sinus pain between eyebrows, under eyes, and in cheeks    She eats 4 or more servings of fruits and vegetables daily.She consumes 0 sweetened beverage(s) daily.She exercises with enough effort to increase her heart rate 9 or less minutes per day.  She exercises with enough effort to increase her heart rate 3 or less days per week.   She is taking medications regularly.                 Review of Systems         Objective    /82   Pulse 66   Temp 98.5  F (36.9  C) (Temporal)   Resp 18   Ht 1.727 m (5' 8\")   Wt 113.8 kg (250 lb 14.4 oz)   SpO2 95%   BMI 38.15 kg/m    Body mass index is 38.15 kg/m .  Physical Exam   Alert very pleasant.  Tympanic membrane's pearly and translucent.  He is mucosas with moderate congestion.  Tenderness palpation frontal and maxillary sinuses bilaterally, no erythema or edema.  Oropharynx clear.  Heart with regular rate and rhythm.  Lungs clear.  No CVA tenderness.    Office Visit on 11/22/2023   Component Date Value Ref Range Status    Color Urine 11/22/2023 Yellow  Colorless, Straw, Light Yellow, Yellow Final    Appearance Urine 11/22/2023 Clear  Clear Final    Glucose Urine 11/22/2023 Negative  Negative mg/dL Final    Bilirubin Urine 11/22/2023 Small (A)  Negative Final    Ketones Urine 11/22/2023 Negative  Negative mg/dL Final    Specific Gravity Urine 11/22/2023 1.015  1.005 - 1.030 Final    Blood Urine 11/22/2023 Large (A)  Negative Final    pH Urine 11/22/2023 6.5  5.0 - 8.0 Final    Protein Albumin Urine 11/22/2023 100 (A)  Negative mg/dL Final    Urobilinogen Urine 11/22/2023 1.0  0.2, 1.0 E.U./dL Final    Nitrite Urine 11/22/2023 Negative  Negative Final    Leukocyte Esterase Urine 11/22/2023 Moderate (A)  Negative Final    Bacteria Urine 11/22/2023 Few (A)  None Seen /HPF " Final    RBC Urine 11/22/2023 10-25 (A)  0-2 /HPF /HPF Final    WBC Urine 11/22/2023 25-50 (A)  0-5 /HPF /HPF Final    Squamous Epithelials Urine 11/22/2023 Moderate (A)  None Seen /LPF Final    WBC Clumps Urine 11/22/2023 Present (A)  None Seen /HPF Final    Transitional Epithelials Urine 11/22/2023 Few (A)  None Seen /HPF Final

## 2023-11-24 LAB — BACTERIA UR CULT: NORMAL

## 2023-11-30 ASSESSMENT — SLEEP AND FATIGUE QUESTIONNAIRES
HOW LIKELY ARE YOU TO NOD OFF OR FALL ASLEEP WHILE SITTING QUIETLY AFTER LUNCH WITHOUT ALCOHOL: WOULD NEVER DOZE
HOW LIKELY ARE YOU TO NOD OFF OR FALL ASLEEP WHEN YOU ARE A PASSENGER IN A CAR FOR AN HOUR WITHOUT A BREAK: MODERATE CHANCE OF DOZING
HOW LIKELY ARE YOU TO NOD OFF OR FALL ASLEEP WHILE SITTING INACTIVE IN A PUBLIC PLACE: SLIGHT CHANCE OF DOZING
HOW LIKELY ARE YOU TO NOD OFF OR FALL ASLEEP IN A CAR, WHILE STOPPED FOR A FEW MINUTES IN TRAFFIC: WOULD NEVER DOZE
HOW LIKELY ARE YOU TO NOD OFF OR FALL ASLEEP WHILE LYING DOWN TO REST IN THE AFTERNOON WHEN CIRCUMSTANCES PERMIT: MODERATE CHANCE OF DOZING
HOW LIKELY ARE YOU TO NOD OFF OR FALL ASLEEP WHILE SITTING AND TALKING TO SOMEONE: WOULD NEVER DOZE
HOW LIKELY ARE YOU TO NOD OFF OR FALL ASLEEP WHILE WATCHING TV: SLIGHT CHANCE OF DOZING
HOW LIKELY ARE YOU TO NOD OFF OR FALL ASLEEP WHILE SITTING AND READING: MODERATE CHANCE OF DOZING

## 2023-12-01 ENCOUNTER — HOSPITAL ENCOUNTER (OUTPATIENT)
Dept: CT IMAGING | Facility: HOSPITAL | Age: 68
Discharge: HOME | End: 2023-12-01
Attending: FAMILY MEDICINE | Admitting: FAMILY MEDICINE
Payer: COMMERCIAL

## 2023-12-01 DIAGNOSIS — J01.90 ACUTE SINUSITIS TREATED WITH ANTIBIOTICS IN THE PAST 60 DAYS: ICD-10-CM

## 2023-12-01 PROCEDURE — 70486 CT MAXILLOFACIAL W/O DYE: CPT

## 2023-12-03 ENCOUNTER — MYC MEDICAL ADVICE (OUTPATIENT)
Dept: FAMILY MEDICINE | Facility: CLINIC | Age: 68
End: 2023-12-03
Payer: COMMERCIAL

## 2023-12-04 NOTE — RESULT ENCOUNTER NOTE
Your CT suggests sinus inflammation.  If you continue to have symptoms despite treatment with antibiotics, Next step is to see ENT specialist.

## 2023-12-05 NOTE — TELEPHONE ENCOUNTER
"Patient read CT result note on 12/5/23-see below:    \"Your CT suggests sinus inflammation.  If you continue to have symptoms despite treatment with antibiotics, Next step is to see ENT specialist.    Written by Bertha Patel MD on 12/4/2023  1:37 PM CST View Past Comments  Seen by patient Clarisse Romero on 12/5/2023 11:06 AM\"    Writer will done this message to patient.    Giuliana Galarza RN, BSN  [facility]    "

## 2023-12-06 ENCOUNTER — OFFICE VISIT (OUTPATIENT)
Dept: SLEEP MEDICINE | Facility: CLINIC | Age: 68
End: 2023-12-06
Attending: FAMILY MEDICINE
Payer: COMMERCIAL

## 2023-12-06 VITALS
SYSTOLIC BLOOD PRESSURE: 146 MMHG | HEART RATE: 62 BPM | DIASTOLIC BLOOD PRESSURE: 103 MMHG | OXYGEN SATURATION: 100 % | WEIGHT: 252.7 LBS | BODY MASS INDEX: 38.3 KG/M2 | HEIGHT: 68 IN

## 2023-12-06 DIAGNOSIS — R06.83 SNORING: Primary | ICD-10-CM

## 2023-12-06 DIAGNOSIS — R53.82 CHRONIC FATIGUE: ICD-10-CM

## 2023-12-06 DIAGNOSIS — R06.81 WITNESSED APNEIC SPELLS: ICD-10-CM

## 2023-12-06 DIAGNOSIS — R40.0 SLEEPINESS: ICD-10-CM

## 2023-12-06 PROCEDURE — 99204 OFFICE O/P NEW MOD 45 MIN: CPT

## 2023-12-06 NOTE — PATIENT INSTRUCTIONS
"          MY TREATMENT INFORMATION FOR SLEEP APNEA-  Clarisse Romero    DOCTOR : OLINDA Francis CNP    Am I having a sleep study at a sleep center?  --->Due to normal delays, you will be contacted within 2-4 weeks to schedule    Am I having a home sleep study?  --->Watch the video for the device you are using:    -/drop off device- https://www.[employer]pt[employer].com/watch?v=yGGFBdELGhk    Frequently asked questions:  1. What is Obstructive Sleep Apnea (AMINATA)? AMINATA is the most common type of sleep apnea. Apnea means, \"without breath.\"  Apnea is most often caused by narrowing or collapse of the upper airway as muscles relax during sleep.   Almost everyone has occasional apneas. Most people with sleep apnea have had brief interruptions at night frequently for many years.  The severity of sleep apnea is related to how frequent and severe the events are.   2. What are the consequences of AMINATA? Symptoms include: feeling sleepy during the day, snoring loudly, gasping or stopping of breathing, trouble sleeping, and occasionally morning headaches or heartburn at night.  Sleepiness can be serious and even increase the risk of falling asleep while driving. Other health consequences may include development of high blood pressure and other cardiovascular disease in persons who are susceptible. Untreated AMINATA can contribute to heart disease, stroke and diabetes.   3. What are the treatment options? In most situations, sleep apnea is a lifelong disease that must be managed with daily therapy. Medications are not effective for sleep apnea and surgery is generally not considered until other therapies have been tried. Your treatment is your choice . Continuous Positive Airway (CPAP) works right away and is the therapy that is effective in nearly everyone. An oral device to hold your jaw forward is usually the next most reliable option. Other options include postioning devices (to keep you off your back), weight loss, and surgery " including a tongue pacing device. There is more detail about some of these options below.  4. Are my sleep studies covered by insurance? Although we will request verification of coverage, we advise you also check in advance of the study to ensure there is coverage.    Important tips for those choosing CPAP and similar devices  For new devices, sign up for device ERLIN to monitor your device for your followup visits  We encourage you to utilize the [employer] erlin or website (myAir web (resmed.com) ) to monitor your therapy progress and share the data with your healthcare team when you discuss your sleep apnea.                                                    Know your equipment:  CPAP is continuous positive airway pressure that prevents obstructive sleep apnea by keeping the throat from collapsing while you are sleeping. In most cases, the device is  smart  and can slowly self-adjusts if your throat collapses and keeps a record every day of how well you are treated-this information is available to you and your care team.  BPAP is bilevel positive airway pressure that keeps your throat open and also assists each breath with a pressure boost to maintain adequate breathing.  Special kinds of BPAP are used in patients who have inadequate breathing from lung or heart disease. In most cases, the device is  smart  and can slowly self-adjusts to assist breathing. Like CPAP, the device keeps a record of how well you are treated.  Your mask is your connection to the device. You get to choose what feels most comfortable and the staff will help to make sure if fits. Here: are some examples of the different masks that are available: Magnetic mask aids may assist with use but there are safety issues that should be addressed when considering with magnets* ( see end of discussion).       Key points to remember on your journey with sleep apnea:  Sleep study.  PAP devices often need to be adjusted during a sleep study to show  that they are effective and adjusted right.  Good tips to remember: Try wearing just the mask during a quiet time during the day so your body adapts to wearing it. A humidifier is recommended for comfort in most cases to prevent drying of your nose and throat. Allergy medication from your provider may help you if you are having nasal congestion.  Getting settled-in. It takes more than one night for most of us to get used to wearing a mask. Try wearing just the mask during a quiet time during the day so your body adapts to wearing it. A humidifier is recommended for comfort in most cases. Our team will work with you carefully on the first day and will be in contact within 4 days and again at 2 and 4 weeks for advice and remote device adjustments. Your therapy is evaluated by the device each day.   Use it every night. The more you are able to sleep naturally for 7-8 hours, the more likely you will have good sleep and to prevent health risks or symptoms from sleep apnea. Even if you use it 4 hours it helps. Occasionally all of us are unable to use a medical therapy, in sleep apnea, it is not dangerous to miss one night.   Communicate. Call our skilled team on the number provided on the first day if your visit for problems that make it difficult to wear the device. Over 2 out of 3 patients can learn to wear the device long-term with help from our team. Remember to call our team or your sleep providers if you are unable to wear the device as we may have other solutions for those who cannot adapt to mask CPAP therapy. It is recommended that you sleep your sleep provider within the first 3 months and yearly after that if you are not having problems.   Use it for your health. We encourage use of CPAP masks during daytime quiet periods to allow your face and brain to adapt to the sensation of CPAP so that it will be a more natural sensation to awaken to at night or during naps. This can be very useful during the first few  weeks or months of adapting to CPAP though it does not help medically to wear CPAP during wakefulness and  should not be used as a strategy just to meet guidelines.  Take care of your equipment. Make sure you clean your mask and tubing using directions every day and that your filter and mask are replaced as recommended or if they are not working.     *Masks with magnets:  Updated Contraindications  Masks with magnetic components are contraindicated for use by patients where they, or anyone in close physical contact while using the mask, have the following:   Active medical implants that interact with magnets (i.e., pacemakers, implantable cardioverter defibrillators (ICD), neurostimulators, cerebrospinal fluid (CSF) shunts, insulin/infusion pumps)   Metallic implants/objects containing ferromagnetic material (i.e., aneurysm clips/flow disruption devices, embolic coils, stents, valves, electrodes, implants to restore hearing or balance with implanted magnets, ocular implants, metallic splinters in the eye)  Updated Warning  Keep the mask magnets at a safe distance of at least 6 inches (150 mm) away from implants or medical devices that may be adversely affected by magnetic interference. This warning applies to you or anyone in close physical contact with your mask. The magnets are in the frame and lower headgear clips, with a magnetic field strength of up to 400mT. When worn, they connect to secure the mask but may inadvertently detach while asleep.  Implants/medical devices, including those listed within contraindications, may be adversely affected if they change function under external magnetic fields or contain ferromagnetic materials that attract/repel to magnetic fields (some metallic implants, e.g., contact lenses with metal, dental implants, metallic cranial plates, screws, kristine hole covers, and bone substitute devices). Consult your physician and  of your implant / other medical device for  information on the potential adverse effects of magnetic fields.    BESIDES CPAP, WHAT OTHER THERAPIES ARE THERE?    Positioning Device  Positioning devices are generally used when sleep apnea is mild and only occurs on your back.This example shows a pillow that straps around the waist. It may be appropriate for those whose sleep study shows milder sleep apnea that occurs primarily when lying flat on one's back. Preliminary studies have shown benefit but effectiveness at home may need to be verified by a home sleep test. These devices are generally not covered by medical insurance.  Examples of devices that maintain sleeping on the back to prevent snoring and mild sleep apnea.    Belt type body positioner  http://BlueSpace/    Electronic reminder  http://nightshifttherapy.com/            Oral Appliance  What is oral appliance therapy?  An oral appliance device fits on your teeth at night like a retainer used after having braces. The device is made by a specialized dentist and requires several visits over 1-2 months before a manufactured device is made to fit your teeth and is adjusted to prevent your sleep apnea. Once an oral device is working properly, snoring should be improved. A home sleep test may be recommended at that time if to determine whether the sleep apnea is adequately treated.       Some things to remember:  -Oral devices are often, but not always, covered by your medical insurance. Be sure to check with your insurance provider.   -If you are referred for oral therapy, you will be given a list of specialized dentists to consider or you may choose to visit the Web site of the American Academy of Dental Sleep Medicine  -Oral devices are less likely to work if you have severe sleep apnea or are extremely overweight.     More detailed information  An oral appliance is a small acrylic device that fits over the upper and lower teeth  (similar to a retainer or a mouth guard). This device slightly moves  jaw forward, which moves the base of the tongue forward, opens the airway, improves breathing for effective treat snoring and obstructive sleep apnea in perhaps 7 out of 10 people .  The best working devices are custom-made by a dental device  after a mold is made of the teeth 1, 2, 3.  When is an oral appliance indicated?  Oral appliance therapy is recommended as a first-line treatment for patients with primary snoring, mild sleep apnea, and for patients with moderate sleep apnea who prefer appliance therapy to use of CPAP4, 5. Severity of sleep apnea is determined by sleep testing and is based on the number of respiratory events per hour of sleep.   How successful is oral appliance therapy?  The success rate of oral appliance therapy in patients with mild sleep apnea is 75-80% while in patients with moderate sleep apnea it is 50-70%. The chance of success in patients with severe sleep apnea is 40-50%. The research also shows that oral appliances have a beneficial effect on the cardiovascular health of AMINATA patients at the same magnitude as CPAP therapy7.  Oral appliances should be a second-line treatment in cases of severe sleep apnea, but if not completely successful then a combination therapy utilizing CPAP plus oral appliance therapy may be effective. Oral appliances tend to be effective in a broad range of patients although studies show that the patients who have the highest success are females, younger patients, those with milder disease, and less severe obesity. 3, 6.   Finding a dentist that practices dental sleep medicine  Specific training is available through the American Academy of Dental Sleep Medicine for dentists interested in working in the field of sleep. To find a dentist who is educated in the field of sleep and the use of oral appliances, near you, visit the Web site of the American Academy of Dental Sleep Medicine.    References  1. gaudencio Cha al. Objectively measured vs  self-reported compliance during oral appliance therapy for sleep-disordered breathing. Chest 2013; 144(5): 7153-7964.  2. Dio, et al. Objective measurement of compliance during oral appliance therapy for sleep-disordered breathing. Thorax 2013; 68(1): 91-96.  3. Meghan et al. Mandibular advancement devices in 620 men and women with AMINATA and snoring: tolerability and predictors of treatment success. Chest 2004; 125: 7221-0189.  4. Sharath et al. Oral appliances for snoring and AMINATA: a review. Sleep 2006; 29: 244-262.  5. Jodie et al. Oral appliance treatment for AMINATA: an update. J Clin Sleep Med 2014; 10(2): 215-227.  6. Ying et al. Predictors of OSAH treatment outcome. J Dent Res 2007; 86: 3363-2241.      Weight Loss:    Weight loss is a long-term strategy that may improve sleep apnea in some patients.    Weight management is a personal decision and the decision should be based on your interest and the potential benefits.  If you are interested in exploring weight loss strategies, the following discussion covers the impact on weight loss on sleep apnea and the approaches that may be successful.    Being overweight does not necessarily mean you will have health consequences.  Those who have BMI over 35 or over 27 with existing medical conditions carries greater risk.   Weight loss decreases severity of sleep apnea in most people with obesity. For those with mild obesity who have developed snoring with weight gain, even 15-30 pound weight loss can improve and occasionally eliminate sleep apnea.  Structured and life-long dietary and health habits are necessary to lose weight and keep healthier weight levels.     Though there may be significant health benefits from weight loss, long-term weight loss is very difficult to achieve- studies show success with dietary management in less than 10% of people. In addition, substantial weight loss may require years of dietary control and may be difficult if  patients have severe obesity. In these cases, surgical management may be considered.  Finally, older individuals who have tolerated obesity without health complications may be less likely to benefit from weight loss strategies.      BMI 38    Surgery:    Surgery for obstructive sleep apnea is considered generally only when other therapies fail to work. Surgery may be discussed with you if you are having a difficult time tolerating CPAP and or when there is an abnormal structure that requires surgical correction.  Nose and throat surgeries often enlarge the airway to prevent collapse.  Most of these surgeries create pain for 1-2 weeks and up to half of the most common surgeries are not effective throughout life.  You should carefully discuss the benefits and drawbacks to surgery with your sleep provider and surgeon to determine if it is the best solution for you.   More information  Surgery for AMINATA is directed at areas that are responsible for narrowing or complete obstruction of the airway during sleep.  There are a wide range of procedures available to enlarge and/or stabilize the airway to prevent blockage of breathing in the three major areas where it can occur: the palate, tongue, and nasal regions.  Successful surgical treatment depends on the accurate identification of the factors responsible for obstructive sleep apnea in each person.  A personalized approach is required because there is no single treatment that works well for everyone.  Because of anatomic variation, consultation with an examination by a sleep surgeon is a critical first step in determining what surgical options are best for each patient.  In some cases, examination during sedation may be recommended in order to guide the selection of procedures.  Patients will be counseled about risks and benefits as well as the typical recovery course after surgery. Surgery is typically not a cure for a person s AMINATA.  However, surgery will often  significantly improve one s AMINATA severity (termed  success rate ).  Even in the absence of a cure, surgery will decrease the cardiovascular risk associated with OSA7; improve overall quality of life8 (sleepiness, functionality, sleep quality, etc).  Palate Procedures:  Patients with AMINATA often have narrowing of their airway in the region of their tonsils and uvula.  The goals of palate procedures are to widen the airway in this region as well as to help the tissues resist collapse.  Modern palate procedure techniques focus on tissue conservation and soft tissue rearrangement, rather than tissue removal.  Often the uvula is preserved in this procedure. Residual sleep apnea is common in patient after pharyngoplasty with an average reduction in sleep apnea events of 33%2.    Tongue Procedures:  ExamWhile patients are awake, the muscles that surround the throat are active and keep this region open for breathing. These muscles relax during sleep, allowing the tongue and other structures to collapse and block breathing.  There are several different tongue procedures available.  Selection of a tongue base procedure depends on characteristics seen on physical exam.  Generally, procedures are aimed at removing bulky tissues in this area or preventing the back of the tongue from falling back during sleep.  Success rates for tongue surgery range from 50-62%3.  Hypoglossal Nerve Stimulation:  Hypoglossal nerve stimulation has recently received approval from the [place] Food and Drug Administration for the treatment of obstructive sleep apnea.  This is based on research showing that the system was safe and effective in treating sleep apnea6.  Results showed that the median AHI score decreased 68%, from 29.3 to 9.0. This therapy uses an implant system that senses breathing patterns and delivers mild stimulation to airway muscles, which keeps the airway open during sleep.  The system consists of three fully implanted  components: a small generator (similar in size to a pacemaker), a breathing sensor, and a stimulation lead.  Using a small handheld remote, a patient turns the therapy on before bed and off upon awakening.    Candidates for this device must be greater than 18 years of age, have moderate to severe AMINATA (AHI between 15-65), BMI less than 35, have tried CPAP/oral appliance for at least 8 weeks without success, and have appropriate upper airway anatomy (determined by a sleep endoscopy performed by Dr. Vern Mcnulty).  Hypoglossal Nerve Stimulation Pathway:    The sleep surgeon s office will work with the patient through the insurance prior-authorization process (including communications and appeals).    Nasal Procedures:  Nasal obstruction can interfere with nasal breathing during the day and night.  Studies have shown that relief of nasal obstruction can improve the ability of some patients to tolerate positive airway pressure therapy for obstructive sleep apnea1.  Treatment options include medications such as nasal saline, topical corticosteroid and antihistamine sprays, and oral medications such as antihistamines or decongestants. Non-surgical treatments can include external nasal dilators for selected patients. If these are not successful by themselves, surgery can improve the nasal airway either alone or in combination with these other options.  Combination Procedures:  Combination of surgical procedures and other treatments may be recommended, particularly if patients have more than one area of narrowing or persistent positional disease.  The success rate of combination surgery ranges from 66-80%2,3.    References  Adrienne BRADY. The Role of the Nose in Snoring and Obstructive Sleep Apnoea: An Update.  Eur Arch Otorhinolaryngol. 2011; 268: 1365-73.   Ion SM; Alicia JA; Terri JR; Pallanch JF; Tiny RIZO; Angelia RIVERA; Kenia GARDUNO. Surgical modifications of the upper airway for obstructive sleep apnea in adults: a  systematic review and meta-analysis. SLEEP 2010;33(10):7675-5697. Luis HARRIS. Hypopharyngeal surgery in obstructive sleep apnea: an evidence-based medicine review.  Arch Otolaryngol Head Neck Surg. 2006 Feb;132(2):206-13.  Wally YH1, Caitlin Y, Tono JAYDE. The efficacy of anatomically based multilevel surgery for obstructive sleep apnea. Otolaryngol Head Neck Surg. 2003 Oct;129(4):327-35.  Kezirian E, Goldberg A. Hypopharyngeal Surgery in Obstructive Sleep Apnea: An Evidence-Based Medicine Review. Arch Otolaryngol Head Neck Surg. 2006 Feb;132(2):206-13.  Christopher GARCIA et al. Upper-Airway Stimulation for Obstructive Sleep Apnea.  N Engl J Med. 2014 Jan 9;370(2):139-49.  Howard Y et al. Increased Incidence of Cardiovascular Disease in Middle-aged Men with Obstructive Sleep Apnea. Am J Respir Crit Care Med; 2002 166: 159-165  Coronel EM et al. Studying Life Effects and Effectiveness of Palatopharyngoplasty (SLEEP) study: Subjective Outcomes of Isolated Uvulopalatopharyngoplasty. Otolaryngol Head Neck Surg. 2011; 144: 623-631.  WHAT IF I ONLY HAVE SNORING?    Mandibular advancement devices, lateral sleep positioning, long-term weight loss and treatment of nasal allergies have been shown to improve snoring.  Exercising tongue muscles with a game (https://apps.[employer]/us/erlin/soundly-reduce-snoring/jx9344589487) or stimulating the tongue during the day with a device (https://doi.org/10.3390/plt15581937) have improved snoring in some individuals.    Remember to Drive Safe... Drive Alive     Sleep health profoundly affects your health, mood, and your safety.  Thirty three percent of the population (one in three of us) is not getting enough sleep and many have a sleep disorder. Not getting enough sleep or having an untreated / undertreated sleep condition may make us sleepy without even knowing it. In fact, our driving could be dramatically impaired due to our sleep health. As your provider, here are some things I would like you to  know about driving:     Here are some warning signs for impairment and dangerous drowsy driving:              -Having been awake more than 16 hours               -Looking tired               -Eyelid drooping              -Head nodding (it could be too late at this point)              -Driving for more than 30 minutes     Some things you could do to make the driving safer if you are experiencing some drowsiness:              -Stop driving and rest              -Call for transportation              -Make sure your sleep disorder is adequately treated     Some things that have been shown NOT to work when experiencing drowsiness while driving:              -Turning on the radio              -Opening windows              -Eating any  distracting  /  entertaining  foods (e.g., sunflower seeds, candy, or any other)              -Talking on the phone      Your decision may not only impact your life, but also the life of others. Please, remember to drive safe for yourself and all of us.    Your blood pressure was checked while you were in clinic today.  Please read the guidelines below about what these numbers mean and what you should do about them.  Your systolic blood pressure is the top number.  This is the pressure when the heart is pumping.  Your diastolic blood pressure is the bottom number.  This is the pressure in between beats.  If your systolic blood pressure is less than 120 and your diastolic blood pressure is less than 80, then your blood pressure is normal. There is nothing more that you need to do about it  If your systolic blood pressure is 120-139 or your diastolic blood pressure is 80-89, your blood pressure may be higher than it should be.  You should have your blood pressure re-checked within a year by a primary care provider.  If your systolic blood pressure is 140 or greater or your diastolic blood pressure is 90 or greater, you may have high blood pressure.  High blood pressure is treatable, but if  left untreated over time it can put you at risk for heart attack, stroke, or kidney failure.  You should have your blood pressure re-checked by a primary care provider within the next four weeks.

## 2023-12-06 NOTE — PROGRESS NOTES
Outpatient Sleep Medicine Consultation:      Name: Clarisse Romero MRN# [id number]   Age: 68 year old YOB: 1955     Date of Consultation: December 6, 2023  Consultation is requested by: Catarino Nicholson MD  [address]  [place] [address] Catarino Nicholson  Primary care provider: Catarino Nicholson       Reason for Sleep Consult:     Clarisse Romero is sent by Catarino Nicholson for a sleep consultation regarding snoring.    Patient s Reason for visit  Clarisse Romero main reason for visit: Headaches and tiredness. Unable to return to sleep after waking.  Patient states problem(s) started: 6-8 months  Clarisse Romero's goals for this visit: Assessment of sleep apnea and snoring.           Assessment and Plan:     Summary Sleep Diagnoses:  (R06.83) Snoring (primary encounter diagnosis) (R06.81) Witnessed apneic spells (Z68.38) BMI 38.0-38.9, adult (R40.0) Sleepiness (R53.82) Chronic fatigue  Comment: Clarisse is a 68-year-old female who presents to the sleep clinic accompanied by her , Ike, with symptoms of snoring, fatigue, and witnessed apneic spells. Clarisse doesn't feel rested in the morning. She feels like she could lie down and nap shortly after waking up. She has been more fatigued for about 1 year. Clarisse has been snoring for a long time. Her son started telling her about 1 year ago that he can hear her snoring from the basement. She will have occasional snort/gasp arousals. Her  has witnessed apnea. Denies restless legs. No unusual nocturnal behavior. Her STOP-BANG is 7/8- snoring, fatigue/sleepiness (ESS 8/24 consistent with a higher level of normal daytime sleepiness), observed apnea, HTN, BMI >35 (38), age >50 (68), and neck circumference >40 cm (45 cm).         Plan: Comprehensive Sleep Study  Clarisse is at high risk for obstructive sleep apnea. Recommended a polysomnogram. We reviewed treatment options for obstructive sleep apnea including PAP therapy, mandibular advancement device,  positional therapy, surgery, weight management, and hypoglossal nerve stimulator. Patient would be interested in trying CPAP depending on the results of her sleep study.    Comorbid Diagnoses:  HTN, depression, obesity    Summary Recommendations:  Orders Placed This Encounter   Procedures    Comprehensive Sleep Study     Summary Counseling:    Sleep Testing Reviewed  Obstructive Sleep Apnea Reviewed  Complications of Untreated Sleep Apnea Reviewed    Results of her sleep study will be communicated via MyChart or telephone.  OLINDA Francis CNP    Total time spent reviewing medical records, history and physical examination, review of previous testing and interpretation as well as documentation on this date: 45 minutes    CC: Catarino Nicholson MD          History of Present Illness:     Clarisse presents to the sleep clinic with symptoms of snoring and fatigue. She doesn't feel rested in the morning. She feels like she could lie down and nap shortly after waking up. She has been more fatigued for about 1 year. Clarisse has been snoring for a long time her  says. Her son started telling her about 1 year ago that he can hear her snoring from the basement. She will have occasional snort/gasp arousals.        Past Sleep Evaluations: None    SLEEP-WAKE SCHEDULE:     Work/School Days: Patient goes to school/work: No   Usually gets into bed at 11:00 pm 10:30-11 PM  Takes patient about Not sure to fall asleep  Has trouble falling asleep Read until very sleepy nights per week  Wakes up in the middle of the night Several times.  Wakes up due to Use the bathroom;Anxiety Bathroom 2-3 times per night, sometimes she is up worrying about who is going to care for her mother or get her to her appointments  She has trouble falling back asleep 2x a week times a week.   It usually takes When i have trouble 45 minutes to hours to get back to sleep  Patient is usually up at 7:00am to 8:00am  Uses alarm: No    Weekends/Non-work Days/All  Other Days:  Usually gets into bed at 10:30-11:00pm   Takes patient about I read until very sleepy to fall asleep  Patient is usually up at Around 8-8:30  Uses alarm: No    Sleep Need  Patient gets 7 1/2-8 hours sleep on average   Patient thinks she needs about 8hours sleep    Clarisse Romero prefers to sleep in this position(s): Side She will wake up on her back  Patient states they do the following activities in bed: Read    Naps  Patient takes a purposeful nap 0 days times a week and naps are usually Not sure in duration  She feels better after a nap: Yes  She dozes off unintentionally 0 days per week. She will unintentionally fall asleep while watching TV.   Patient has had a driving accident or near-miss due to sleepiness/drowsiness: No    SLEEP DISRUPTIONS:    Breathing/Snoring  Patient snores: Yes  Other people complain about her snoring: Yes  Patient has been told she stops breathing in her sleep: Yes, her  has seen her stop breathing in her sleep.   She has issues with the following: Morning headaches;Morning mouth dryness;Stuffy nose when you wake up;Getting up to urinate more than once She has been getting headaches since this summer. She attributes some of her headaches to stress related to caring for her elderly mother. She can get headaches at different times of the day.    Movement:  Patient gets pain, discomfort, with an urge to move: Yes Denies restless legs. She denies pain or discomfort.    It happens when she is resting: Yes  It happens more at night: No  Patient has been told she kicks her legs at night: No     Behaviors in Sleep:  Clarisse Romero has experienced the following behaviors while sleeping: Recurring Nightmares;Sleep-talking;Night terrors (screaming,yelling or acting afraid but not recalling event) She doesn't recall a lot of dreams. She doesn't have nightmares but has strange dreams. Every once in a while her  will hear her scream out.   She has experienced sudden muscle  weakness during the day: Yes, she has knee pain that will cause weakness during the day.   Pt denies bruxism, sleep walking, and dream enactment behavior. Pt denies sleep paralysis, hypnagogue and cataplexy.    Is there anything else you would like your sleep provider to know: No    CAFFEINE AND OTHER SUBSTANCES:    Patient consumes caffeinated beverages per day: 1-coffee  Last caffeine use is usually: Morning  List of any prescribed or over the counter stimulants that patient takes: Not sure  List of any prescribed or over the counter sleep medication patient takes: None  List of previous sleep medications that patient has tried: None  Patient drinks alcohol to help them sleep: No  Patient drinks alcohol near bedtime: No    Family History:  Patient has a family member been diagnosed with a sleep disorder: Yes  Mother,  Her  and mother have CPAP machines.      Social History: She lives at home with her  and her son who lives in the basement.     SCALES:    EPWORTH SLEEPINESS SCALE         11/30/2023    11:25 AM    [place] Sleepiness Scale ( JAYDEN Chaparro  [id number]<br>ESS - [place]/English - Final version - 21 Nov 07 - [facility] [place].)   Sitting and reading Moderate chance of dozing   Watching TV Slight chance of dozing   Sitting, inactive in a public place (e.g. a theatre or a meeting) Slight chance of dozing   As a passenger in a car for an hour without a break Moderate chance of dozing   Lying down to rest in the afternoon when circumstances permit Moderate chance of dozing   Sitting and talking to someone Would never doze   Sitting quietly after a lunch without alcohol Would never doze   In a car, while stopped for a few minutes in traffic Would never doze   [place] Score (MC) 8   [place] Score (Sleep) 8         INSOMNIA SEVERITY INDEX (TAMIE)          11/30/2023    10:54 AM   Insomnia Severity Index (TAMIE)   Difficulty falling asleep 3   Difficulty staying asleep 3   Problems waking up too  "early 3   How SATISFIED/DISSATISFIED are you with your CURRENT sleep pattern? 3   How NOTICEABLE to others do you think your sleep problem is in terms of impairing the quality of your life? 4   How WORRIED/DISTRESSED are you about your current sleep problem? 3   To what extent do you consider your sleep problem to INTERFERE with your daily functioning (e.g. daytime fatigue, mood, ability to function at work/daily chores, concentration, memory, mood, etc.) CURRENTLY? 4   TAMIE Total Score 23       Guidelines for Scoring/Interpretation:  Total score categories:  0-7 = No clinically significant insomnia   8-14 = Subthreshold insomnia   15-21 = Clinical insomnia (moderate severity)  22-28 = Clinical insomnia (severe)  Used via courtesy of www.Revionicsth.va.gov with permission from Silvio Fermin PhD., [facility]      STOP BANG         12/6/2023    12:00 PM   STOP BANG Questionnaire (  2008, the American Society of Anesthesiologists, Inc. [employer]   [facility] Clinic: 146/103         GAD7         No data to display                  CAGE-AID         No data to display                CAGE-AID reprinted with permission from the [place] Medical Journal, SHAJI Mcdaniel. and MAKI Mackenzie, \"Conjoint screening questionnaires for alcohol and drug abuse\" [place] Medical Journal 94: 135-140, 1995.      PATIENT HEALTH QUESTIONNAIRE-9 (PHQ - 9)        8/28/2023     9:53 AM   PHQ-9 ([employer])   1.  Little interest or pleasure in doing things 0   2.  Feeling down, depressed, or hopeless 0   3.  Trouble falling or staying asleep, or sleeping too much 1   4.  Feeling tired or having little energy 0   5.  Poor appetite or overeating 0   6.  Feeling bad about yourself - or that you are a failure or have let yourself or your family down 0   7.  Trouble concentrating on things, such as reading the newspaper or watching television 0   8.  Moving or speaking so slowly that other people could have noticed. Or the opposite - " being so fidgety or restless that you have been moving around a lot more than usual 0   9.  Thoughts that you would be better off dead, or of hurting yourself in some way 0   PHQ-9 Total Score 1   6.  Feeling bad about yourself 0   7.  Trouble concentrating 0   8.  Moving slowly or restless 0   9.  Suicidal or self-harm thoughts 0   1.  Little interest or pleasure in doing things Not at all   2.  Feeling down, depressed, or hopeless Not at all   3.  Trouble falling or staying asleep, or sleeping too much Several days   4.  Feeling tired or having little energy Not at all   5.  Poor appetite or overeating Not at all   6.  Feeling bad about yourself Not at all   7.  Trouble concentrating Not at all   8.  Moving slowly or restless Not at all   9.  Suicidal or self-harm thoughts Not at all   PHQ-9 via [employer] TOTAL SCORE-----> 1 (Minimal depression)   Difficulty at work, home, or with people Not difficult at all       Developed by Devan Donato, Nieves Tyler, Nabil Troy and colleagues, with an educational kyaw from [employer] No permission required to reproduce, translate, display or distribute.        Allergies:    No Known Allergies    Medications:    Current Outpatient Medications   Medication Sig Dispense Refill    atenolol (TENORMIN) 50 MG tablet Take 1 tablet (50 mg) by mouth daily 90 tablet 3    citalopram (CELEXA) 20 MG tablet Take 1 tablet (20 mg) by mouth daily 90 tablet 3    fluticasone (FLONASE) 50 MCG/ACT nasal spray Inhale 2 sprays in each nostril once daily. 16 g 3    losartan (COZAAR) 50 MG tablet Take 1 tablet (50 mg) by mouth daily 90 tablet 3       Problem List:  Patient Active Problem List    Diagnosis Date Noted    Chronic pain of both knees 09/06/2023     Priority: Medium    Class 2 severe obesity due to excess calories with serious comorbidity in adult (H) 08/28/2023     Priority: Medium    Urinary incontinence 08/22/2023     Priority: Medium    Benign neoplasm of descending  colon 11/14/2019     Priority: Medium    Diverticular disease of large intestine 11/12/2019     Priority: Medium    Polyp of colon 11/12/2019     Priority: Medium    Major depression in partial remission (H24)      Priority: Medium     Created by Conversion        Migraine Headache      Priority: Medium     Created by Conversion  Replacement Utility updated for latest IMO load        Hypertension      Priority: Medium     Created by Conversion  Replacement Utility updated for latest IMO load        Sinusitis      Priority: Medium     Created by Conversion  Replacement Utility updated for latest IMO load        Allergic rhinitis 01/26/2016     Priority: Medium    Joint Pain, Localized In The Knee      Priority: Medium     Created by Conversion            Past Medical/Surgical History:  Past Medical History:   Diagnosis Date    Depressive disorder     Hypertension      Past Surgical History:   Procedure Laterality Date    RELEASE CARPAL TUNNEL  06/01/2016    RELEASE TRIGGER FINGER  06/01/2016       Social History:  Social History     Socioeconomic History    Marital status:      Spouse name: Not on file    Number of children: Not on file    Years of education: Not on file    Highest education level: Not on file   Occupational History    Not on file   Tobacco Use    Smoking status: Never    Smokeless tobacco: Never   Vaping Use    Vaping Use: Never used   Substance and Sexual Activity    Alcohol use: Yes     Comment: Very rarely    Drug use: Never    Sexual activity: Not on file   Other Topics Concern    Not on file   Social History Narrative    Not on file     Social Determinants of Health     Financial Resource Strain: Low Risk  (11/21/2023)    Financial Resource Strain     Within the past 12 months, have you or your family members you live with been unable to get utilities (heat, electricity) when it was really needed?: No   Food Insecurity: Low Risk  (11/21/2023)    Food Insecurity     Within the past 12  months, did you worry that your food would run out before you got money to buy more?: No     Within the past 12 months, did the food you bought just not last and you didn t have money to get more?: No   Transportation Needs: Low Risk  (11/21/2023)    Transportation Needs     Within the past 12 months, has lack of transportation kept you from medical appointments, getting your medicines, non-medical meetings or appointments, work, or from getting things that you need?: No   Physical Activity: Not on file   Stress: Not on file   Social Connections: Not on file   Interpersonal Safety: Low Risk  (11/22/2023)    Interpersonal Safety     Do you feel physically and emotionally safe where you currently live?: Yes     Within the past 12 months, have you been hit, slapped, kicked or otherwise physically hurt by someone?: No     Within the past 12 months, have you been humiliated or emotionally abused in other ways by your partner or ex-partner?: No   Housing Stability: Low Risk  (11/21/2023)    Housing Stability     Do you have housing? : Yes     Are you worried about losing your housing?: No       Family History:  Family History   Problem Relation Age of Onset    Sleep Apnea Mother     Breast Cancer Maternal Aunt        Review of Systems:  A complete review of systems reviewed by me is negative with the exeption of what has been mentioned in the history of present illness.  In the last TWO WEEKS have you experienced any of the following symptoms?  Fevers: No  Night Sweats: No  Weight Gain: No  Pain at Night: No  Double Vision: No  Changes in Vision: No  Difficulty Breathing through Nose: Yes  Sore Throat in Morning: Yes  Dry Mouth in the Morning: Yes  Shortness of Breath Lying Flat: No  Shortness of Breath With Activity: No  Awakening with Shortness of Breath: No  Increased Cough: No  Heart Racing at Night: No  Swelling in Feet or Legs: No  Diarrhea at Night: No  Heartburn at Night: No  Urinating More than Once at Night:  "Yes  Losing Control of Urine at Night: Yes  Joint Pains at Night: No  Headaches in Morning: Yes  Weakness in Arms or Legs: No  Depressed Mood: No  Anxiety: No     Physical Examination:  Vitals: BP (!) 146/103   Pulse 62   Ht 1.727 m (5' 7.99\")   Wt 114.6 kg (252 lb 11.2 oz)   SpO2 100%   BMI 38.43 kg/m    BMI= Body mass index is 38.43 kg/m .    Neck Cir (cm): 45 cm    GENERAL APPEARANCE: healthy, alert, no distress, cooperative, and obese  EYES: Eyes grossly normal to inspection  HENT: oropharynx crowded, tongue base enlarged, and scalloping noted to tongue  NECK: no asymmetry, masses, or scars  RESP: no increased work of breathing noted, no audible cough or wheeze   NEURO: mentation intact and speech normal  PSYCH: mentation appears normal and affect normal/bright  Mallampati Class: III.  Tonsillar Stage: 1 hidden by pillars.         Data: All pertinent previous laboratory data reviewed     Recent Labs   Lab Test 08/28/23  1022 08/22/22  1030    138   POTASSIUM 4.1 4.4   CHLORIDE 101 100   CO2 28 29   ANIONGAP 10 9   * 122*   BUN 11.1 10.7   CR 0.68 0.63   ALYSSIA 9.8 10.1       No results for input(s): \"WBC\", \"RBC\", \"HGB\", \"HCT\", \"MCV\", \"MCH\", \"MCHC\", \"RDW\", \"PLT\" in the last 51886 hours.    Recent Labs   Lab Test 08/28/23  1022   PROTTOTAL 7.0   ALBUMIN 4.4   BILITOTAL 0.8   ALKPHOS 70   AST 23   ALT 20       No results found for: \"TSH\"    No results found for: \"UAMP\", \"UBARB\", \"BENZODIAZEUR\", \"UCANN\", \"UCOC\", \"OPIT\", \"UPCP\"    No results found for: \"IRONSAT\", \"FW77269\", \"EZEQUIEL\"    No results found for: \"PH\", \"PHARTERIAL\", \"PO2\", \"XD3CZTQCOLB\", \"SAT\", \"PCO2\", \"HCO3\", \"BASEEXCESS\", \"FIDEL\", \"BEB\"    @LABRCNTIPR(phv:4,pco2v:4,po2v:4,hco3v:4,danita:4,o2per:4)@    Echocardiology: No results found for this or any previous visit (from the past 4320 hour(s)).    Chest x-ray: No results found for this or any previous visit from the past 365 days.      Chest CT: No results found for this or any previous visit from " "the past 365 days.      PFT: Most Recent [facility] Pulmonary Function Testing    No results found for: \"20001\"  No results found for: \"20002\"  No results found for: \"20003\"  No results found for: \"20015\"  No results found for: \"20016\"  No results found for: \"20027\"  No results found for: \"20028\"  No results found for: \"20029\"  No results found for: \"20079\"  No results found for: \"20080\"  No results found for: \"20081\"  No results found for: \"20335\"  No results found for: \"20105\"  No results found for: \"20053\"  No results found for: \"20054\"  No results found for: \"20055\"      Susan Davis, APRN CNP 12/6/2023   "

## 2023-12-07 ASSESSMENT — SLEEP AND FATIGUE QUESTIONNAIRES
HOW LIKELY ARE YOU TO NOD OFF OR FALL ASLEEP WHILE LYING DOWN TO REST IN THE AFTERNOON WHEN CIRCUMSTANCES PERMIT: HIGH CHANCE OF DOZING
HOW LIKELY ARE YOU TO NOD OFF OR FALL ASLEEP WHILE WATCHING TV: SLIGHT CHANCE OF DOZING
HOW LIKELY ARE YOU TO NOD OFF OR FALL ASLEEP WHILE SITTING AND READING: SLIGHT CHANCE OF DOZING
HOW LIKELY ARE YOU TO NOD OFF OR FALL ASLEEP WHILE SITTING INACTIVE IN A PUBLIC PLACE: SLIGHT CHANCE OF DOZING
HOW LIKELY ARE YOU TO NOD OFF OR FALL ASLEEP WHILE SITTING QUIETLY AFTER LUNCH WITHOUT ALCOHOL: SLIGHT CHANCE OF DOZING
HOW LIKELY ARE YOU TO NOD OFF OR FALL ASLEEP WHILE SITTING AND TALKING TO SOMEONE: WOULD NEVER DOZE
HOW LIKELY ARE YOU TO NOD OFF OR FALL ASLEEP IN A CAR, WHILE STOPPED FOR A FEW MINUTES IN TRAFFIC: WOULD NEVER DOZE
HOW LIKELY ARE YOU TO NOD OFF OR FALL ASLEEP WHEN YOU ARE A PASSENGER IN A CAR FOR AN HOUR WITHOUT A BREAK: MODERATE CHANCE OF DOZING

## 2023-12-08 ENCOUNTER — TELEPHONE (OUTPATIENT)
Dept: FAMILY MEDICINE | Facility: CLINIC | Age: 68
End: 2023-12-08
Payer: COMMERCIAL

## 2023-12-08 DIAGNOSIS — J32.9 CHRONIC SINUSITIS, UNSPECIFIED LOCATION: Primary | ICD-10-CM

## 2023-12-08 NOTE — TELEPHONE ENCOUNTER
Reason for call:  Other     Patient called regarding (reason for call): call back    Additional comments: Patient is calling and asking if she can get a referral for ENT. Please advise and call patient back please and thank you.    Phone number to reach patient:  Cell number on file:    Telephone Information:   Mobile [phone]       Best Time:  any    Can we leave a detailed message on this number?  YES

## 2023-12-11 NOTE — TELEPHONE ENCOUNTER
FUTURE VISIT INFORMATION      FUTURE VISIT INFORMATION:  Date: 1/12/23  Time: 2:40PM  Location: [facility]  REFERRAL INFORMATION:  Referring provider:  Bertha Patel MD   Referring providers clinic:  [facility]   Reason for visit/diagnosis  per patient chronic sinus infections confirmed [facility]     RECORDS REQUESTED FROM:       Clinic name Comments Records Status Imaging Status   [facility] 11/22/23- OV Bertha Patel MD   10/9/23- OV Catarino Nicholson MD  [facility] 10/2/23- OV Wu Anguiano PA-C     MORE IN EPIC  EPIC     IMAGING 12/1/23- CT SINUS  7/8/14- CT SINUS  [facility] 3/8/2016- OV        
yes

## 2023-12-12 ENCOUNTER — THERAPY VISIT (OUTPATIENT)
Dept: SLEEP MEDICINE | Facility: CLINIC | Age: 68
End: 2023-12-12
Payer: COMMERCIAL

## 2023-12-12 DIAGNOSIS — R40.0 SLEEPINESS: ICD-10-CM

## 2023-12-12 DIAGNOSIS — G47.33 OSA (OBSTRUCTIVE SLEEP APNEA): Primary | ICD-10-CM

## 2023-12-12 DIAGNOSIS — R06.81 WITNESSED APNEIC SPELLS: ICD-10-CM

## 2023-12-12 DIAGNOSIS — R53.82 CHRONIC FATIGUE: ICD-10-CM

## 2023-12-12 DIAGNOSIS — R06.83 SNORING: ICD-10-CM

## 2023-12-12 PROCEDURE — 95810 POLYSOM 6/> YRS 4/> PARAM: CPT | Performed by: INTERNAL MEDICINE

## 2023-12-13 ENCOUNTER — THERAPY VISIT (OUTPATIENT)
Dept: PHYSICAL THERAPY | Facility: REHABILITATION | Age: 68
End: 2023-12-13
Payer: COMMERCIAL

## 2023-12-13 DIAGNOSIS — M25.562 CHRONIC PAIN OF BOTH KNEES: Primary | ICD-10-CM

## 2023-12-13 DIAGNOSIS — M25.561 CHRONIC PAIN OF BOTH KNEES: Primary | ICD-10-CM

## 2023-12-13 DIAGNOSIS — G89.29 CHRONIC PAIN OF BOTH KNEES: Primary | ICD-10-CM

## 2023-12-13 PROCEDURE — 97110 THERAPEUTIC EXERCISES: CPT | Mod: GP | Performed by: PHYSICAL THERAPIST

## 2023-12-13 NOTE — PROGRESS NOTES
[facility]                                                                                   OUTPATIENT PHYSICAL THERAPY    PLAN OF TREATMENT FOR OUTPATIENT REHABILITATION   Patient's Last Name, First Name, Clarisse Mix YOB: 1955   Provider's Name   [facility]   Medical Record No.  [id number]     Onset Date: 08/28/23  Start of Care Date: 09/06/23     Medical Diagnosis:  M25.561, M25.562, G89.29 (ICD-10-CM) - Chronic pain of both knees      PT Treatment Diagnosis:  functional lower extremity weakness, impairments with balance Plan of Treatment  Frequency/Duration: weekly/ 30 weeks (patient travelling [facility] for Jan-March)    Certification date from 11/30/23 to 04/03/24         See note for plan of treatment details and functional goals     Refugio Smith PT                         I CERTIFY THE NEED FOR THESE SERVICES FURNISHED UNDER        THIS PLAN OF TREATMENT AND WHILE UNDER MY CARE     (Physician attestation of this document indicates review and certification of the therapy plan).              Referring Provider:  Catarino Nicholson    Initial Assessment  See Epic Evaluation- Start of Care Date: 09/06/23 12/13/23 0500   Appointment Info   Signing clinician's name / credentials Refugio Smith PT   Total/Authorized Visits 12   Visits Used 3   Medical Diagnosis M25.561, M25.562, G89.29 (ICD-10-CM) - Chronic pain of both knees   PT Tx Diagnosis functional lower extremity weakness, impairments with balance   Precautions/Limitations none   Quick Adds Certification   Progress Note/Certification   Start of Care Date 09/06/23   Onset of illness/injury or Date of Surgery 08/28/23   Therapy Frequency weekly   Predicted Duration 30 weeks  (patient travelling [facility] for Jan-March)   Certification date from 11/30/23   Certification date to 04/03/24   Progress Note Due Date 04/03/23   Progress Note Completed Date 12/13/23   GOALS  "  PT Goals 2;3   PT Goal 1   Goal Identifier HEP   Goal Description Patient will demonstrate >50% compliance with home exercise program to promote independence and progress towards   Goal Progress not met   Target Date 10/18/23   PT Goal 2   Goal Identifier 30 sec STS   Goal Description Patient will achieve a score of 9 or greater repetitions in 30 second sit to stand test to reflect normative trunk and lower extremity strength values.   Goal Progress not met   Target Date 11/29/23   PT Goal 3   Goal Identifier single leg balance   Goal Description Patient will improve single leg balance to at least 10 seconds bilaterally to reduce falls risk and improve balance confidence   Goal Progress not met   Target Date 11/29/23   Subjective Report   Subjective Report patient reports having improvement, less aching pain in the morning. continues to have problems with descending stairs, acending is getting better. going camping in [place] in January with ,torrie quintana looking forward to having more time to work on herself. has been very busy with taking care of mother transitioning to an assistid living and cleaning out her  mothers house getting it ready for sale. will leave 3rd week fo january and return 2nd week of march   Treatment Interventions (PT)   Interventions Therapeutic Procedure/Exercise;Self Care/Home Management   Therapeutic Procedure/Exercise   Therapeutic Procedures: strength, endurance, ROM, flexibillity minutes (59568) 45   Ther Proc 1 - Details treadmill walk 10 minutes (subjective measures gathered). single leg balance 3-5 sec x 5-6 reps each. standing TA with march x 10 reps. sit to stands 2 x 10 reps. LAQ's L1 RB x15 reps hip abduction x 10 reps. single leg balance 5-10 sec x 10 reps.step ups on 8\" x 10 reps B using bilateral railings. SLR x15 reps.   Skilled Intervention Patient was instructed in their home exercise program. Patient performed at least 1 set of each exercise during the session. Cues were " provided to ensure proper movement and control. Patient reported appropriate tolerance with each exercise.   Education   Learner/Method Patient   Plan   Plan for next session review and progress HEP as appropriate   Comments   Comments Patient is a 68 year old female returning for 3rd visit to address ongoing chronic bilateral knee pain (right more painful than left) and functionall ower extremity weakness and balance concerns.  The following significant findings have been identified: Pain, Decreased strength, Impaired balance, Impaired muscle performance, and Decreased activity tolerance. These impairments interfere with their ability to perform household mobility and community mobility as compared to previous level of function. 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 Patient is also traveling south for 3 months, and wants to resume PT upon her return to the state. Patient will benefit from skilled physical therapy to address impairments and functional limitations in order to achieve their goals.   Total Session Time   Timed Code Treatment Minutes 45   Total Treatment Time (sum of timed and untimed services) 45     Refugio Smith, PT

## 2023-12-14 PROBLEM — G47.33 OSA (OBSTRUCTIVE SLEEP APNEA): Status: ACTIVE | Noted: 2023-12-14

## 2023-12-15 LAB — SLPCOMP: NORMAL

## 2023-12-15 NOTE — PROCEDURES
"   SLEEP STUDY INTERPRETATION  DIAGNOSTIC POLYSOMNOGRAPHY REPORT      Patient: DION BARONE  YOB: 1955  Study Date: 12/12/2023  MRN: [id number]  Referring Provider: MD Nicholson Michael  Ordering Provider: OLINDA Davis CNP, Colie    Indications for Polysomnography: The patient is a 68 year old Female who is 5' 8\" and weighs 252.0 lbs. Her BMI is 38.6, [place] sleepiness scale 8 and neck circumference is 45 cm. A diagnostic polysomnogram was performed to evaluate for sleep apnea.    Polysomnogram Data: A full night polysomnogram recorded the standard physiologic parameters including EEG, EOG, EMG, ECG, nasal and oral airflow. Respiratory parameters of chest and abdominal movements were recorded with respiratory inductance plethysmography. Oxygen saturation was recorded by pulse oximetry. Hypopnea scoring rule used: 1B 4%.    Sleep Architecture: Fragmented sleep with reduce sleep efficiency.   The total recording time of the polysomnogram was 532.0 minutes. The total sleep time was 424.0 minutes. Sleep latency was increased at 37.5 minutes without the use of a sleep aid. REM latency was 411.5 minutes. Arousal index was increased at 74.2 arousals per hour. Sleep efficiency was decreased at 79.7%. Wake after sleep onset was 70.5 minutes. The patient spent 13.0% of total sleep time in Stage N1, 70.6% in Stage N2, 10.6% in Stage N3, and 5.8% in REM. Time in REM supine was - minutes.    Respiration: Severe obstructive sleep apnea.   Events ? The polysomnogram revealed a presence of 39 obstructive, 2 central, and 5 mixed apneas resulting in an apnea index of 6.5 events per hour. There were 228 obstructive hypopneas and - central hypopneas resulting in an obstructive hypopnea index of 32.3 and central hypopnea index of - events per hour. The combined apnea/hypopnea index was 38.8 events per hour (central apnea/hypopnea index was 0.3 events per hour). The REM AHI was 7.3 events per hour. The supine AHI was 96.9 " events per hour. The RERA index was 2.5 events per hour.  The RDI was 41.3 events per hour.  Snoring - was reported as moderate.  Respiratory rate and pattern - was notable for normal respiratory rate and pattern.  Sustained Sleep Associated Hypoventilation - Transcutaneous carbon dioxide monitoring was not used.  Sleep Associated Hypoxemia - (Greater than 5 minutes O2 sat at or below 88%) was present. Baseline oxygen saturation was 90.4%. Lowest oxygen saturation was 82.0%. Time spent less than or equal to 88% was 58.0 minutes. Time spent less than or equal to 89% was 118.7 minutes.    Movement Activity: Mildly increased periodic limb movements of sleep and associated arousals.   Periodic Limb Activity - There were 209 PLMs during the entire study. The PLM index was 29.6 movements per hour. The PLM Arousal Index was 11.7 per hour.  REM EMG Activity - Excessive transient/sustained muscle activity was not present.  Nocturnal Behavior - Abnormal sleep related behaviors were not noted during/arising out of NREM / REM sleep.   Bruxism - None apparent.    Cardiac Summary: Sinus rhythm.   The average pulse rate was 64.3 bpm. The minimum pulse rate was 53.0 bpm while the maximum pulse rate was 109.0 bpm.      Assessment:   Severe obstructive sleep apnea with associated oxygen desaturations, sleep fragmentation and sleep associated hypoxemia. Sleep apnea events were exacerbated in supine sleep.     Recommendations:  CPAP therapy is recommended for severe obstructive sleep apnea. Treatment could be empirically initiated with Auto-titrating PAP therapy with a range of 5 to 15 cmH2O. Recommend clinical follow up in 1-3 months after starting treatment to monitor response, compliance and CPAP download data.   If CPAP is not successful, alternative considerations include dental appliance through referral to Sleep Dentistry or evaluation of possible surgical options through referral to specialized ENT-Sleep provider.  Suggest  optimizing sleep schedule and avoiding sleep deprivation.  Weight management (if BMI > 30).  Pharmacologic therapy should be used for management of restless legs syndrome only if present and clinically indicated and not based on the presence of periodic limb movements alone.    Diagnostic Codes:   Obstructive Sleep Apnea G47.33  Sleep Hypoxemia G47.36   Repetitive Intrusions Into Sleep F51.8    12/12/2023 [facility] Sleep Study (252.0 lbs) - AHI 38.8, RDI 41.3, Supine AHI 96.9, REM AHI 7.3, Low O2 82.0%, Time Spent ?88% 58.0 minutes / Time Spent ?89% 118.7 minutes.      _____________________________________   Electronically Signed By: Darío Montaño MD 12/14/2023

## 2023-12-18 ENCOUNTER — DOCUMENTATION ONLY (OUTPATIENT)
Dept: SLEEP MEDICINE | Facility: CLINIC | Age: 68
End: 2023-12-18
Payer: COMMERCIAL

## 2023-12-18 ENCOUNTER — DOCUMENTATION ONLY (OUTPATIENT)
Dept: SLEEP MEDICINE | Facility: CLINIC | Age: 68
End: 2023-12-18

## 2023-12-18 DIAGNOSIS — G47.33 OSA (OBSTRUCTIVE SLEEP APNEA): Primary | ICD-10-CM

## 2023-12-18 DIAGNOSIS — G47.33 OBSTRUCTIVE SLEEP APNEA (ADULT) (PEDIATRIC): Primary | ICD-10-CM

## 2023-12-18 NOTE — PROGRESS NOTES
Patient was offered choice of vendor and chose [facility].  Patient Clarisse Romero was set up at [place] on December 18, 2023. Patient received a [employer]med Airsense 10 Pressures were set at  5-15 cm H2O.   Patient s ramp is 5 cm H2O for Auto and FLEX/EPR is EPR, 3.  Patient received a [employer] [place] Pleasure Mask name: Vitera Full Face mask size Small, heated tubing and heated humidifier.  Patient has the following compliance requirements: using and visit requirements  Patient has a follow up on 3/27/24 with MARJAN Krause

## 2023-12-21 ENCOUNTER — DOCUMENTATION ONLY (OUTPATIENT)
Dept: SLEEP MEDICINE | Facility: CLINIC | Age: 68
End: 2023-12-21
Payer: COMMERCIAL

## 2023-12-21 DIAGNOSIS — G47.33 OSA (OBSTRUCTIVE SLEEP APNEA): Primary | ICD-10-CM

## 2023-12-21 NOTE — PROGRESS NOTES
3 day Sleep therapy management telephone visit    Diagnostic AHI: 38.8  PSG    Confirmed with patient at time of call- N/A Patient is still interested in STM service       Message left for patient to return call.        Objective data     Order Settings for PAP  CPAP min     CPAP max              Device settings from machine CPAP min 5.0     CPAP max 15.0           EPR Setting THREE    RESMED soft response  ON     Assessment: Nightly usage over four hours      Action plan: Patient to have 14 day STM visit. Patient has a follow up visit scheduled:   yes within 31-90 days of set up    Replacement device: No  STM ordered by provider: Yes     Total time spent on accessing and  interpreting remote patient PAP therapy data  10 minutes    Total time spent counseling, coaching  and reviewing PAP therapy data with patient  1 minutes    [id number] no

## 2023-12-22 ENCOUNTER — DOCUMENTATION ONLY (OUTPATIENT)
Dept: SLEEP MEDICINE | Facility: CLINIC | Age: 68
End: 2023-12-22
Payer: COMMERCIAL

## 2023-12-22 DIAGNOSIS — G47.33 OSA (OBSTRUCTIVE SLEEP APNEA): Primary | ICD-10-CM

## 2023-12-22 NOTE — PROGRESS NOTES
STM Recheck : Patient called back she stated she sleeping so much better since started to use CPAP.  Patient is having skin breakout from her mask.  She will use an old T-Shirt to work as a barrier between the skin and mask.

## 2024-01-09 ENCOUNTER — MYC MEDICAL ADVICE (OUTPATIENT)
Dept: FAMILY MEDICINE | Facility: CLINIC | Age: 69
End: 2024-01-09

## 2024-01-11 ENCOUNTER — ALLIED HEALTH/NURSE VISIT (OUTPATIENT)
Dept: FAMILY MEDICINE | Facility: CLINIC | Age: 69
End: 2024-01-11
Payer: COMMERCIAL

## 2024-01-11 VITALS — SYSTOLIC BLOOD PRESSURE: 136 MMHG | DIASTOLIC BLOOD PRESSURE: 86 MMHG

## 2024-01-11 DIAGNOSIS — Z01.30 BLOOD PRESSURE CHECK: Primary | ICD-10-CM

## 2024-01-11 PROCEDURE — 99207 PR NO CHARGE NURSE ONLY: CPT

## 2024-01-11 NOTE — PROGRESS NOTES
I met with Clarisse Romero at the request of Dr. Catarino Nicholson to recheck her blood pressure.  Blood pressure medications on the med list were reviewed with patient.    Patient has taken all medications as per usual regimen: Yes  Patient reports tolerating them without any issues or concerns: Yes    Vitals:    01/11/24 1043 01/11/24 1052   BP: (!) 140/84 136/86   BP Location: Right arm Right arm   Patient Position: Sitting Sitting   Cuff Size: Adult Large Adult Large       After 5 minutes, the patient's blood pressure was <140/90, the previous encounter was reviewed, recorded blood pressure below 140/90. Patient was discharged and the note will be sent to the provider for final review.

## 2024-01-12 ENCOUNTER — PRE VISIT (OUTPATIENT)
Dept: OTOLARYNGOLOGY | Facility: CLINIC | Age: 69
End: 2024-01-12

## 2024-01-12 ENCOUNTER — OFFICE VISIT (OUTPATIENT)
Dept: OTOLARYNGOLOGY | Facility: CLINIC | Age: 69
End: 2024-01-12
Attending: FAMILY MEDICINE
Payer: COMMERCIAL

## 2024-01-12 VITALS
WEIGHT: 260.5 LBS | OXYGEN SATURATION: 95 % | DIASTOLIC BLOOD PRESSURE: 93 MMHG | HEIGHT: 68 IN | HEART RATE: 65 BPM | SYSTOLIC BLOOD PRESSURE: 164 MMHG | BODY MASS INDEX: 39.48 KG/M2

## 2024-01-12 DIAGNOSIS — J34.2 DEVIATED NASAL SEPTUM: ICD-10-CM

## 2024-01-12 DIAGNOSIS — G47.33 OSA (OBSTRUCTIVE SLEEP APNEA): ICD-10-CM

## 2024-01-12 DIAGNOSIS — G43.909 MIGRAINE WITHOUT STATUS MIGRAINOSUS, NOT INTRACTABLE, UNSPECIFIED MIGRAINE TYPE: Primary | ICD-10-CM

## 2024-01-12 PROCEDURE — 99204 OFFICE O/P NEW MOD 45 MIN: CPT | Mod: 25 | Performed by: STUDENT IN AN ORGANIZED HEALTH CARE EDUCATION/TRAINING PROGRAM

## 2024-01-12 PROCEDURE — 31231 NASAL ENDOSCOPY DX: CPT | Performed by: STUDENT IN AN ORGANIZED HEALTH CARE EDUCATION/TRAINING PROGRAM

## 2024-01-12 ASSESSMENT — PAIN SCALES - GENERAL: PAINLEVEL: NO PAIN (0)

## 2024-01-12 NOTE — PATIENT INSTRUCTIONS
You were seen in the ENT Clinic today by Dr. Hand. If you have any questions or concerns after your appointment, please contact us (see below)    Please return to clinic as needed    How to Contact Us:  Send a Zura! message to your provider. Our team will respond to you via Zura!. Occasionally, we will need to call you to get further information.  For urgent matters (Monday-Friday), call the ENT Clinic: [phone] and speak with a call center team member - they will route your call appropriately.   If you'd like to speak directly with a nurse, please find our contact information below. We do our best to check voicemail frequently throughout the day, and will work to call you back within 1-2 days. For urgent matters, please use the general clinic phone numbers listed above.    Lynette CASTELLANO RN, BSN   RN Care Coordinator, ENT Clinic  [facility]  Direct: [phone]  Taryn STONE LPN  Direct: [phone]
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
## 2024-03-07 ENCOUNTER — TELEPHONE (OUTPATIENT)
Dept: FAMILY MEDICINE | Facility: CLINIC | Age: 69
End: 2024-03-07
Payer: COMMERCIAL

## 2024-03-07 DIAGNOSIS — I10 ESSENTIAL HYPERTENSION: ICD-10-CM

## 2024-03-07 RX ORDER — ATENOLOL 50 MG/1
50 TABLET ORAL DAILY
Qty: 90 TABLET | Refills: 3 | Status: SHIPPED | OUTPATIENT
Start: 2024-03-07 | End: 2024-09-16

## 2024-03-07 NOTE — TELEPHONE ENCOUNTER
Reason for Call:  Other prescription    Detailed comments: REFILL NEEDED AT AN OUT OF TOWN PHARMACY    PATIENT HAS 3 LEFT    PHARMACY SAID THAT THEY DID A DIRECT SCRIPT AND CAN NOT USE REFILLS LISTED ON CHART    atenolol (TENORMIN) 50 MG tablet [2012]       [facility] #[id number]  [address]   [place] [address]    [phone]    Phone Number Patient can be reached at: Cell number on file:    Telephone Information:   Mobile [phone]       Best Time: ASAP    Can we leave a detailed message on this number? YES    Call taken on 3/7/2024 at 11:13 AM by Rosalina Verdugo

## 2024-03-18 ENCOUNTER — NURSE TRIAGE (OUTPATIENT)
Dept: FAMILY MEDICINE | Facility: CLINIC | Age: 69
End: 2024-03-18

## 2024-03-18 DIAGNOSIS — U07.1 INFECTION DUE TO 2019 NOVEL CORONAVIRUS: Primary | ICD-10-CM

## 2024-03-18 NOTE — TELEPHONE ENCOUNTER
Reason for call:  Patient reporting a symptom    Symptom or request: COVID POSITIVE WOULD LIKE TO SPEAK TO A COVID NURSE    Duration (how long have symptoms been present): 1 DAY    Have you been treated for this before? No    Additional comments: COVID TEST AT HOME 3-18-24    Phone Number patient can be reached at:  Cell number on file:    Telephone Information:   Mobile [phone]       Best Time:  ASAP    Can we leave a detailed message on this number:  YES    Call taken on 3/18/2024 at 12:15 PM by Rosalina Verdugo

## 2024-03-18 NOTE — TELEPHONE ENCOUNTER
Nurse Triage SBAR    Is this a 2nd Level Triage? NO    Situation:   Writer calling patient regarding positive COVID test     Background:   Risks: Age, obesity, htn, depression   Labs: No concerns  Meds: No concerns     Sx start: 3/14   Positive test: 3/18    Using mucinex with cough suppressant     Assessment:   Current Sx: Congestion/Drainage, Dry Cough, mild Fevers & chills, headaches, dry heaves, nausea   Patient denies chest pain or pressure, no SOB - CPAP is helping     Protocol Recommended Disposition:   Discuss With PCP And Callback By Nurse Within 1 Hour    Recommendation:   Home care advice given   Patient had COVID July 2022 and used Paxlovid then, patient does not feel she needs it at this time because sx are improving      Routed to provider as FYI    Does the patient meet one of the following criteria for ADS visit consideration? 16+ years old, with an [facility] PCP     TIP  Providers, please consider if this condition is appropriate for management at one of our Acute and Diagnostic Services sites.     If patient is a good candidate, please use dotphrase <dot>triageresponse and select Refer to ADS to document.    Reason for Disposition   HIGH RISK patient (e.g., weak immune system, age > 64 years, obesity with BMI of 30 or higher, pregnant, chronic lung disease or other chronic medical condition) and COVID symptoms (e.g., cough, fever)  (Exceptions: Already seen by doctor or NP/PA and no new or worsening symptoms.)    Additional Information   Negative: SEVERE difficulty breathing (e.g., struggling for each breath, speaks in single words)   Negative: Difficult to awaken or acting confused (e.g., disoriented, slurred speech)   Negative: Bluish (or gray) lips or face now   Negative: Shock suspected (e.g., cold/pale/clammy skin, too weak to stand, low BP, rapid pulse)   Negative: Sounds like a life-threatening emergency to the triager   Negative: Lives with someone known to have influenza (flu test positive)  and flu-like symptoms (e.g., cough, runny nose, sore throat, SOB; with or without fever)   Negative: SEVERE or constant chest pain or pressure  (Exception: Mild central chest pain, present only when coughing.)   Negative: MODERATE difficulty breathing (e.g., speaks in phrases, SOB even at rest, pulse 100-120)   Negative: Headache and stiff neck (can't touch chin to chest)   Negative: Oxygen level (e.g., pulse oximetry) 90% or lower   Negative: Chest pain or pressure  (Exception: MILD central chest pain, present only when coughing.)   Negative: Drinking very little and dehydration suspected (e.g., no urine > 12 hours, very dry mouth, very lightheaded)   Negative: Patient sounds very sick or weak to the triager   Negative: MILD difficulty breathing (e.g., minimal/no SOB at rest, SOB with walking, pulse <100)   Negative: Fever > 103 F (39.4 C)   Negative: Fever > 101 F (38.3 C) and over 60 years of age   Negative: Fever > 100.0 F (37.8 C) and bedridden (e.g., CVA, chronic illness, recovering from surgery)    Protocols used: Coronavirus (COVID-19) Diagnosed or [id number] SoteloN, RN  [facility]

## 2024-03-19 NOTE — TELEPHONE ENCOUNTER
FYI - Status Update    Who is Calling: patient    Update: patient is now requesting treatment, please advise    Does caller want a call/response back: Yes     Could we send this information to you in My [employer] or would you prefer to receive a phone call?:   Patient would prefer a phone call   Okay to leave a detailed message?: Yes at Cell number on file:    Telephone Information:   Mobile [phone]

## 2024-03-19 NOTE — TELEPHONE ENCOUNTER
S-(situation):   Writer calling patient back to discuss Paxlovid Tx    B-(background):   Risks: Age, obesity, htn, depression   Labs: No concerns  Meds: No concerns      Sx start: 3/14   Positive test: 3/18    A-(assessment):   No changes in sx since triage on 3/18/24    R-(recommendations):   RN COVID Tx Protcol utilized and rx sent to pharmacy.    RN COVID TREATMENT VISIT  03/19/24      The patient has been triaged and does not require a higher level of care.    Clarisse Romero  69 year old  Current weight? 252 lbs    Has the patient been seen by a primary care provider at an [facility] or [facility] within the past two years? Yes.   Have you been in close proximity to/do you have a known exposure to a person with a confirmed case of influenza? No.     General treatment eligibility:  Date of positive COVID test (PCR or at home)?  3/18/24    Are you or have you been hospitalized for this COVID-19 infection? No.   Have you received monoclonal antibodies or antiviral treatment for COVID-19 since this positive test? No.   Do you have any of the following conditions that place you at risk of being very sick from COVID-19?   - Age 50 years or older  - Heart conditions such as cardiomyopathies, congenital heart defects, coronary artery disease, heart arrhythmias, heart failure, hypertension, valve disorders   - Mental health disorders including mood disorders, depression, schizophrenia spectrum disorders   - Overweight or Obesity (BMI >85th percentile or BMI 25 or higher)  Yes, patient has at least one high risk condition as noted above.     Current COVID symptoms:   - fever or chills  - cough  - fatigue  - congestion or runny nose  - nausea or vomiting  Yes. Patient has at least one symptom as selected.     How many days since symptoms started? 5 days or less. Established patient, 12 years or older weighing at least 88.2 lbs, who has symptoms that started in the past 5 days, has not been  hospitalized nor received treatment already, and is at risk for being very sick from COVID-19.     Treatment eligibility by RN:  Are you currently pregnant or nursing? No  Do you have a clinically significant hypersensitivity to nirmatrelvir or ritonavir, or toxic epidermal necrolysis (TEN) or Ho-Gabriel Syndrome? No  Do you have a history of hepatitis, any hepatic impairment on the Problem List (such as Child-Francisco Class C, cirrhosis, fatty liver disease, alcoholic liver disease), or was the last liver lab (hepatic panel, ALT, AST, ALK Phos, bilirubin) elevated in the past 6 months? No  Do you have any history of severe renal impairment (eGFR < 30mL/min)? No    Is patient eligible to continue? Yes, patient meets all eligibility requirements for the RN COVID treatment (as denoted by all no responses above).     Current Outpatient Medications   Medication Sig Dispense Refill    atenolol (TENORMIN) 50 MG tablet Take 1 tablet (50 mg) by mouth daily 90 tablet 3    citalopram (CELEXA) 20 MG tablet Take 1 tablet (20 mg) by mouth daily 90 tablet 3    fluticasone (FLONASE) 50 MCG/ACT nasal spray Inhale 2 sprays in each nostril once daily. 16 g 3    losartan (COZAAR) 50 MG tablet Take 1 tablet (50 mg) by mouth daily 90 tablet 3       Medications from List 1 of the standing order (on medications that exclude the use of Paxlovid) that patient is taking: NONE. Is patient taking Elana's Wort? No  Is patient taking [place]'s Wort or any meds from List 1? No.   Medications from List 2 of the standing order (on meds that provider needs to adjust) that patient is taking: NONE. Is patient on any of the meds from List 2? No.   Medications from List 3 of standing order (on meds that a RN needs to adjust) that patient is taking: NONE. Is patient on any meds from List 3? No.     Paxlovid has an approximate 90% reduction in hospitalization. Paxlovid can possibly cause altered sense of taste, diarrhea (loose, watery stools), high  blood pressure, muscle aches.     Would patient like a Paxlovid prescription?   Yes.   Lab Results   Component Value Date    GFRESTIMATED >90 08/28/2023       Was last eGFR reduced? No, eGFR 60 or greater/ No Result on record. Patient can receive the normal renal function dose. Paxlovid Rx sent to [facility]     Temporary change to home medications: None    All medication adjustments (holds, etc) were discussed with the patient and patient was asked to repeat back (teachback) their med adjustment.  Did patient understand med adjustment? No medication adjustments needed.         Reviewed the following instructions with the patient:    Paxlovid (nimatrelvir and ritonavir)    How it works  Two medicines (nirmatrelvir and ritonavir) are taken together. They stop the virus from growing. Less amount of virus is easier for your body to fight.    How to take  Medicine comes in a daily container with both medicine tablets. Take by mouth twice daily (once in the morning, once at night) for 5 days.  The number of tablets to take varies by patient.  Don't chew or break capsules. Swallow whole.    When to take  Take as soon as possible after positive COVID-19 test result, and within 5 days of your first symptoms.    Possible side effects  Can cause altered sense of taste, diarrhea (loose, watery stools), high blood pressure, muscle aches.    Cheryl Dunn, RN     Cheryl Dunn, IVYN, RN  [facility]

## 2024-03-22 NOTE — TELEPHONE ENCOUNTER
Patient Quality Outreach    Patient is due for the following:   Hypertension -  BP check    Next Steps:   Patient was scheduled for BP check    Type of outreach:    Phone, spoke to patient/parent.        Questions for provider review:    None           Cheryl Dunn RN

## 2024-03-27 ENCOUNTER — TELEPHONE (OUTPATIENT)
Dept: FAMILY MEDICINE | Facility: CLINIC | Age: 69
End: 2024-03-27

## 2024-03-27 NOTE — TELEPHONE ENCOUNTER
General Call    Contacts         Type Contact Phone/Fax    03/27/2024 02:41 PM CDT Phone (Incoming) Clarisse Romero (Self) [phone] (M)          Reason for Call:  CPAP Compliance    What are your questions or concerns:  PT had an appt for today for CPAP compliance but had to reschedule, next appt is into August 14th for a video visit Susan Davis. Pt was told by [facility] to contact provider to see what to do in the mean time because if she doesn't have an appt before the end of March, she will have to return the machine. Pt is also added to the wait list    Date of last appointment with provider: 12/6/2023    Could we send this information to you [facility] or would you prefer to receive a phone call?:   Patient would prefer a phone call   Okay to leave a detailed message?: Yes at Cell number on file:    Telephone Information:   Mobile [phone]

## 2024-03-28 ASSESSMENT — SLEEP AND FATIGUE QUESTIONNAIRES
HOW LIKELY ARE YOU TO NOD OFF OR FALL ASLEEP IN A CAR, WHILE STOPPED FOR A FEW MINUTES IN TRAFFIC: WOULD NEVER DOZE
HOW LIKELY ARE YOU TO NOD OFF OR FALL ASLEEP WHEN YOU ARE A PASSENGER IN A CAR FOR AN HOUR WITHOUT A BREAK: SLIGHT CHANCE OF DOZING
HOW LIKELY ARE YOU TO NOD OFF OR FALL ASLEEP WHILE SITTING AND TALKING TO SOMEONE: WOULD NEVER DOZE
HOW LIKELY ARE YOU TO NOD OFF OR FALL ASLEEP WHILE LYING DOWN TO REST IN THE AFTERNOON WHEN CIRCUMSTANCES PERMIT: SLIGHT CHANCE OF DOZING
HOW LIKELY ARE YOU TO NOD OFF OR FALL ASLEEP WHILE SITTING AND READING: SLIGHT CHANCE OF DOZING
HOW LIKELY ARE YOU TO NOD OFF OR FALL ASLEEP WHILE SITTING QUIETLY AFTER LUNCH WITHOUT ALCOHOL: WOULD NEVER DOZE
HOW LIKELY ARE YOU TO NOD OFF OR FALL ASLEEP WHILE SITTING INACTIVE IN A PUBLIC PLACE: WOULD NEVER DOZE
HOW LIKELY ARE YOU TO NOD OFF OR FALL ASLEEP WHILE WATCHING TV: SLIGHT CHANCE OF DOZING

## 2024-03-28 NOTE — TELEPHONE ENCOUNTER
FYI - Status Update    Who is Calling: patient    Update: CPAP Compliance visit. PT called again because she has not heard anything. Pt states that she contacted [employer] and they said that if the provider sends over a prior authorization for the CPAP machine, they would be able to let her keep the machine and over ride the 90 day time frame. Pt had to have an appt by the end of March to be able to keep her machine. Ended up scheduling the patient for an appt 04/02/2024 with Dr. Enriquez instead of Susan Davis. Please reach out to patient with any questions.     Does caller want a call/response back: Yes     Could we send this information to you in Couple or would you prefer to receive a phone call?:   Patient would prefer a phone call   Okay to leave a detailed message?: Yes at Cell number on file:    Telephone Information:   Mobile [phone]

## 2024-04-02 ENCOUNTER — OFFICE VISIT (OUTPATIENT)
Dept: SLEEP MEDICINE | Facility: CLINIC | Age: 69
End: 2024-04-02
Payer: COMMERCIAL

## 2024-04-02 VITALS
HEIGHT: 68 IN | HEART RATE: 68 BPM | OXYGEN SATURATION: 95 % | WEIGHT: 250 LBS | BODY MASS INDEX: 37.89 KG/M2 | SYSTOLIC BLOOD PRESSURE: 153 MMHG | DIASTOLIC BLOOD PRESSURE: 105 MMHG

## 2024-04-02 DIAGNOSIS — E66.9 OBESITY (BMI 30-39.9): ICD-10-CM

## 2024-04-02 DIAGNOSIS — G47.33 OSA (OBSTRUCTIVE SLEEP APNEA): ICD-10-CM

## 2024-04-02 DIAGNOSIS — G47.36 HYPOXEMIA ASSOCIATED WITH SLEEP: Primary | ICD-10-CM

## 2024-04-02 PROCEDURE — 99214 OFFICE O/P EST MOD 30 MIN: CPT | Performed by: INTERNAL MEDICINE

## 2024-04-02 NOTE — PROGRESS NOTES
[facility]  Sleep clinic follow-up visit note    Date: April 2, 2024     Chief complaint:  Follow-up of AMINATA, review CPAP compliance measures     Clarisse Romero is a 69 year old  female who presents to sleep clinic for follow-up of previously diagnosed  obstructive sleep apnea that is currently managed with CPAP therapy.  She was diagnosed with severe obstructive sleep apnea with sleep associated hypoxemia during polysomnography obtained on December 12, 2023.  She was set up at [facility] on December 18, 2023. Patient received a Resmed Airsense 10 Pressures were set at  5-15 cm H2O.   Patient s ramp is 5 cm H2O for Auto and FLEX/EPR is EPR, 3.  Patient received a [employer] Mask name: Vitera Full Face mask size Small, heated tubing and heated humidifier.  Patient has the following compliance requirements: using and visit requirements     Previous sleep study report::  Sleep study date: 12/12/23  Sleep Architecture: Fragmented sleep with reduce sleep efficiency.   The total recording time of the polysomnogram was 532.0 minutes. The total sleep time was 424.0 minutes. Sleep latency was increased at 37.5 minutes without the use of a sleep aid. REM latency was 411.5 minutes. Arousal index was increased at 74.2 arousals per hour. Sleep efficiency was decreased at 79.7%. Wake after sleep onset was 70.5 minutes. The patient spent 13.0% of total sleep time in Stage N1, 70.6% in Stage N2, 10.6% in Stage N3, and 5.8% in REM. Time in REM supine was - minutes.     Respiration: Severe obstructive sleep apnea.   Events ? The polysomnogram revealed a presence of 39 obstructive, 2 central, and 5 mixed apneas resulting in an apnea index of 6.5 events per hour. There were 228 obstructive hypopneas and - central hypopneas resulting in an obstructive hypopnea index of 32.3 and central hypopnea index of - events per hour. The combined apnea/hypopnea index was 38.8 events per hour (central apnea/hypopnea index was 0.3  events per hour). The REM AHI was 7.3 events per hour. The supine AHI was 96.9 events per hour. The RERA index was 2.5 events per hour.  The RDI was 41.3 events per hour.  Snoring - was reported as moderate.  Respiratory rate and pattern - was notable for normal respiratory rate and pattern.  Sustained Sleep Associated Hypoventilation - Transcutaneous carbon dioxide monitoring was not used.  Sleep Associated Hypoxemia - (Greater than 5 minutes O2 sat at or below 88%) was present. Baseline oxygen saturation was 90.4%. Lowest oxygen saturation was 82.0%. Time spent less than or equal to 88% was 58.0 minutes. Time spent less than or equal to 89% was 118.7 minutes.     Movement Activity: Mildly increased periodic limb movements of sleep and associated arousals.   Periodic Limb Activity - There were 209 PLMs during the entire study. The PLM index was 29.6 movements per hour. The PLM Arousal Index was 11.7 per hour.  REM EMG Activity - Excessive transient/sustained muscle activity was not present.  Nocturnal Behavior - Abnormal sleep related behaviors were not noted during/arising out of NREM / REM sleep.   Bruxism - None apparent.     Cardiac Summary: Sinus rhythm.   The average pulse rate was 64.3 bpm. The minimum pulse rate was 53.0 bpm while the maximum pulse rate was 109.0 bpm.       Assessment:   Severe obstructive sleep apnea with associated oxygen desaturations, sleep fragmentation and sleep associated hypoxemia. Sleep apnea events were exacerbated in supine sleep.        Pressure settings on CPAP ranged between 5 to 15 cmH2O  Patient reports using the CPAP regularly during sleep. She uses  full face mask. She uses a mask liner she got through [employer] which is washable-with which there has not been any issues with the rash.  There are no reports of snoring, apneas or awakenings due to gasping with the device use.   Pressure settings feel comfortable.  Patient reports good sleep quality with the device use.  "  Bedtime time: 10:30pm , asleep by 11pm. Wake time: 8am  Patient denies nonrestorative sleep, fatigue or excessive daytime sleepiness. ESS: 4 /24.  There are no reports of  drowsiness while driving.         DOWNLOADABLE COMPLIANCE DATA:   ResMed   Auto-PAP 5.0 - 15.0 cmH2O 30 day usage data:    93% of days with > 4 hours of use. 1/30 days with no use.   Average use 488 minutes per day.   95%ile Leak 49.08 L/min.   CPAP 95% pressure 9.7 cm.   AHI 1.25 events per hour.      Past medical/surgical history, family history, social history, medications and allergies were reviewed.            Physical Examination:   BP (!) 157/104   Pulse 68   Ht 1.725 m (5' 7.9\")   Wt 113.4 kg (250 lb)   SpO2 95%   BMI 38.12 kg/m    General: Pleasant. Cooperative. In no apparent distress.  Pulmonary: Able to speak in full sentences easily. No cough or wheeze.   Neurologic: Alert, oriented x3.  Psychiatric: Mood euthymic. Affect congruent with full range and intensity.     ASSESSMENT/PLAN:  Obstructive sleep apnea: Pt reports adequate compliance with CPAP and reports positive benefits with CPAP treatment. Based on compliance measures, AMINATA is adequately controlled with CPAP at the current settings.  Recommended to continue using the CPAP regularly during sleep and getting the supplies for the CPAP replaced regularly.  She also was instructed to follow-up with the DME provider for mask optimization so she can get a different type of fullface mask in the future.  Patient instructed to remember to bring PAP with her if hospitalized and if anticipating procedure that requires sedation/surgery to be sure to discuss with the provider/surgeon that she has sleep apnea and uses PAP therapy.    Sleep associated hypoxemia: Recommended obtaining overnight oximetry with the auto CPAP at the current pressure settings to check for resolution of hypoxemia that was observed during the previous sleep study.  Orders for overnight oximetry generated in " "epic.  Plan is to communicate  test results with the patient via [email or web address].     Hypertension: Her blood pressure is elevated today. She was recommended to follow-up with her primary care provider for optimizing the management of hypertension.    Obesity: We discussed weight management with healthy diet, and exercise.     Patient was strongly advised to avoid driving, operating any heavy machinery or other hazardous situations while drowsy or sleepy.  Patient was counseled on the importance of driving while alert, to pull over if drowsy, or nap before getting into the vehicle if sleepy.      If the overnight oximetry shows resolution of hypoxemia, she will follow-up ollow-up with sleep clinic  in 1 year  or sooner if any concerns.     The above note was dictated using voice recognition software. Although reviewed after completion, some word and grammatical error may remain . Please contact the author for any clarifications.      \" Total time spent was 23 minutes  for this appointment on this date of service which include time spent before, during and after the visit for chart review, patient care, counseling and coordination of care. Including documentation\"      Beto Hernandez MD  [facility]  [address], [address], [place] [address]        "

## 2024-04-02 NOTE — PATIENT INSTRUCTIONS
Your BMI is Body mass index is 38.12 kg/m .    What is BMI?  Body mass index (BMI) is one way to tell whether you are at a healthy weight, overweight, or obese. It measures your weight in relation to your height.  A BMI of 18.5 to 24.9 is in the healthy range. A person with a BMI of 25 to 29.9 is considered overweight, and someone with a BMI of 30 or greater is considered obese.  Another way to find out if you are at risk for health problems caused by overweight and obesity is to measure your waist. If you are a woman and your waist is more than 35 inches, or if you are a man and your waist is more than 40 inches, your risk of disease may be higher.  More than two-thirds of American adults are considered overweight or obese. Being overweight or obese increases the risk for further weight gain.  Excess weight may lead to heart disease and diabetes. Creating and following plans for healthy eating and physical activity may help you improve your health.    Methods for maintaining or losing weight.  Weight control is part of healthy lifestyle and includes exercise, emotional health, and healthy eating habits.  Careful eating habits lifelong is the mainstay of weight control.  Though there are significant health benefits from weight loss, long-term weight loss with diet alone may be very difficult to achieve- studies show long-term success with dietary management in less than 10% of people. Attaining a healthy weight may be especially difficult to achieve in those with severe obesity. In some cases, medications, devices and surgical management might be considered.    What can you do?  If you are overweight or obese and are interested in methods for weight loss, you should discuss this with your provider. In addition, we recommend that you review healthy life styles and methods for weight loss available through the National Institutes of Health patient information sites:    http://win.niddk.nih.gov/publications/index.htm      Your blood pressure was checked while you were in clinic today.  Please read the guidelines below about what these numbers mean and what you should do about them.  Your systolic blood pressure is the top number.  This is the pressure when the heart is pumping.  Your diastolic blood pressure is the bottom number.  This is the pressure in between beats.  If your systolic blood pressure is less than 120 and your diastolic blood pressure is less than 80, then your blood pressure is normal. There is nothing more that you need to do about it  If your systolic blood pressure is 120-139 or your diastolic blood pressure is 80-89, your blood pressure may be higher than it should be.  You should have your blood pressure re-checked within a year by a primary care provider.  If your systolic blood pressure is 140 or greater or your diastolic blood pressure is 90 or greater, you may have high blood pressure.  High blood pressure is treatable, but if left untreated over time it can put you at risk for heart attack, stroke, or kidney failure.  You should have your blood pressure re-checked by a primary care provider within the next four weeks.

## 2024-04-09 DIAGNOSIS — G43.909 MIGRAINE WITHOUT STATUS MIGRAINOSUS, NOT INTRACTABLE, UNSPECIFIED MIGRAINE TYPE: Primary | ICD-10-CM

## 2024-04-09 NOTE — PROGRESS NOTES
Returned call from patient regarding her continued symptoms and request for a neurology referral for further follow up. Left voicemail for patient indicating we have placed referral and that team will reach out to schedule once the referral has been reviewed. Direct call back number left in voicemail for further questions or concerns.     Lynette Santos, RN, BSN  RN Care Coordinator, ENT Clinic

## 2024-04-10 ENCOUNTER — ALLIED HEALTH/NURSE VISIT (OUTPATIENT)
Dept: FAMILY MEDICINE | Facility: CLINIC | Age: 69
End: 2024-04-10
Payer: COMMERCIAL

## 2024-04-10 VITALS — SYSTOLIC BLOOD PRESSURE: 166 MMHG | DIASTOLIC BLOOD PRESSURE: 100 MMHG | HEART RATE: 71 BPM

## 2024-04-10 DIAGNOSIS — I10 ESSENTIAL HYPERTENSION: Primary | ICD-10-CM

## 2024-04-10 PROCEDURE — 99207 PR NO CHARGE NURSE ONLY: CPT

## 2024-04-10 NOTE — PROGRESS NOTES
I met with Clarisse Romero at the request of Catarino Nicholson to recheck her blood pressure.  Blood pressure medications on the med list were reviewed with patient.    Patient has taken all medications as per usual regimen: Yes  Patient reports tolerating them without any issues or concerns: Yes    Vitals:    04/10/24 1039 04/10/24 1055   BP: (!) 170/91 (!) 166/100   Pulse: 74 71       After 5 minutes, the patient's blood pressure remained greater than or equal to 140/90.    Is the patient currently having any chest pain? No  Does the patient currently have a headache? No  Does the patient currently have any vision changes? No  Does the patient currently have any nausea? No  Does the patient currently have any abdominal pain? No    The previous encounter was reviewed.  The patient was discharged and the note will be sent to the provider for final review.    Shelia Isaac MA

## 2024-04-10 NOTE — PROGRESS NOTES
I met with Clarisse Romero at the request of Bharat to recheck her blood pressure.  Blood pressure medications on the med list were reviewed with patient.    Patient has taken all medications as per usual regimen: Yes  Patient reports tolerating them without any issues or concerns: Yes    Vitals:    04/10/24 1039   BP: (!) 170/91   Pulse: 74       Blood pressure was taken, previous encounter was reviewed, {AMB BP FOLLOW UP:66902}

## 2024-04-16 ENCOUNTER — DOCUMENTATION ONLY (OUTPATIENT)
Dept: SLEEP MEDICINE | Facility: CLINIC | Age: 69
End: 2024-04-16

## 2024-04-16 DIAGNOSIS — G47.36 HYPOXEMIA ASSOCIATED WITH SLEEP: Primary | ICD-10-CM

## 2024-04-16 NOTE — Clinical Note
Kelby Griffin and Robert, I request either 1 of you to kindly call patient to schedule the WatchPAT HST to be obtained with the CPAP as soon as possible.   Thank you, Jayla

## 2024-04-18 ENCOUNTER — THERAPY VISIT (OUTPATIENT)
Dept: PHYSICAL THERAPY | Facility: REHABILITATION | Age: 69
End: 2024-04-18
Payer: COMMERCIAL

## 2024-04-18 DIAGNOSIS — G89.29 CHRONIC PAIN OF BOTH KNEES: Primary | ICD-10-CM

## 2024-04-18 DIAGNOSIS — M25.561 CHRONIC PAIN OF BOTH KNEES: Primary | ICD-10-CM

## 2024-04-18 DIAGNOSIS — M25.562 CHRONIC PAIN OF BOTH KNEES: Primary | ICD-10-CM

## 2024-04-18 PROCEDURE — 97110 THERAPEUTIC EXERCISES: CPT | Mod: GP | Performed by: PHYSICAL THERAPIST

## 2024-04-18 NOTE — PROGRESS NOTES
[facility]                                                                                   OUTPATIENT PHYSICAL THERAPY    PLAN OF TREATMENT FOR OUTPATIENT REHABILITATION   Patient's Last Name, First Name, Clarisse Mix YOB: 1955   Provider's Name   [facility]   Medical Record No.  [id number]     Onset Date: 08/28/23  Start of Care Date: 09/06/23     Medical Diagnosis:  M25.561, M25.562, G89.29 (ICD-10-CM) - Chronic pain of both knees      PT Treatment Diagnosis:  functional lower extremity weakness, impairments with balance Plan of Treatment  Frequency/Duration: weekly/ 30 weeks ([place])    Certification date from 11/30/23 to 07/11/24         See note for plan of treatment details and functional goals     Refugio Smith PT                         I CERTIFY THE NEED FOR THESE SERVICES FURNISHED UNDER        THIS PLAN OF TREATMENT AND WHILE UNDER MY CARE     (Physician attestation of this document indicates review and certification of the therapy plan).              Referring Provider:  Catarino Nicholson    Initial Assessment  See Epic Evaluation- Start of Care Date: 09/06/23            PLAN  Continue therapy per current plan of care.    Beginning/End Dates of Progress Note Reporting Period:  04/18/24 to 04/18/2024    Referring Provider:  Catarino Nicholson       04/18/24 0500   Appointment Info   Signing clinician's name / credentials Refugio Smith PT   Total/Authorized Visits 12   Visits Used 4   Medical Diagnosis M25.561, M25.562, G89.29 (ICD-10-CM) - Chronic pain of both knees   PT Tx Diagnosis functional lower extremity weakness, impairments with balance   Precautions/Limitations none   Quick Adds Certification   Progress Note/Certification   Start of Care Date 09/06/23   Onset of illness/injury or Date of Surgery 08/28/23   Therapy Frequency weekly   Predicted Duration 30 weeks  (patient  travelling [facility] for Jan-March)   Certification date from 11/30/23   Certification date to 07/11/24   Progress Note Due Date 07/11/24   Progress Note Completed Date 04/18/24   GOALS   PT Goals 2;3   PT Goal 1   Goal Identifier HEP   Goal Description Patient will demonstrate >50% compliance with home exercise program to promote independence and progress towards   Goal Progress not met   Target Date 10/18/23   PT Goal 2   Goal Identifier 30 sec STS   Goal Description Patient will achieve a score of 9 or greater repetitions in 30 second sit to stand test to reflect normative trunk and lower extremity strength values.   Goal Progress not met   Target Date 11/29/23   PT Goal 3   Goal Identifier single leg balance   Goal Description Patient will improve single leg balance to at least 10 seconds bilaterally to reduce falls risk and improve balance confidence   Goal Progress not met   Target Date 11/29/23   Subjective Report   Subjective Report patient reports continued difficulty with descending stairs. also now more recently having difficulty ascending stairs. rates painm 0/10 while sitting, has to be more careful and isn't sure she can do 10 reps of sit to stands.   Treatment Interventions (PT)   Interventions Therapeutic Procedure/Exercise;Self Care/Home Management   Therapeutic Procedure/Exercise   Therapeutic Procedures: strength, endurance, ROM, flexibility minutes (41377) 40   Ther Proc 1 - Details treadmill walk  1.6mph performed for 5 minutes (subjective measures gathered). single leg balance 3-5 sec x 5-6 reps each. sit to stands 2 x 10 reps emphasis on slow eccentric lowering. LAQ's x10 reps (reviewed using with theraband). standing hip abduction x 10 reps B. tandem walking x 30 steps. education about increasing daily walking gradually - try to achieve 30 minutes of walking total in a day (beginning with 5-10 minutes as able, repeating up to 30 minutes).   Skilled Intervention Patient was instructed in their  home exercise program. Patient performed at least 1 set of each exercise during the session. Cues were provided to ensure proper movement and control. Patient reported appropriate tolerance with each exercise.   Education   Learner/Method Patient   Plan   Plan for next session review and progress HEP as appropriate   Comments   Comments Patient is a 68 year old female returning to PT for follow-up visit upon return from out of state travel. Today's visit was to address ongoing chronic bilateral knee pain (right more painful than left) and functional lower extremity weakness and balance concerns.  The following significant findings have been identified: Pain, Decreased functional strength, Impaired balance, Impaired muscle performance, and Decreased activity tolerance. These impairments interfere with their ability to perform household mobility and community mobility as compared to previous level of function. 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 Patient will benefit from skilled physical therapy to address impairments and functional limitations in order to achieve their goals.   Total Session Time   Timed Code Treatment Minutes 40   Total Treatment Time (sum of timed and untimed services) 40     Refugio Smith, PT

## 2024-05-22 ENCOUNTER — THERAPY VISIT (OUTPATIENT)
Dept: PHYSICAL THERAPY | Facility: REHABILITATION | Age: 69
End: 2024-05-22
Payer: COMMERCIAL

## 2024-05-22 DIAGNOSIS — M25.561 CHRONIC PAIN OF BOTH KNEES: Primary | ICD-10-CM

## 2024-05-22 DIAGNOSIS — G89.29 CHRONIC PAIN OF BOTH KNEES: Primary | ICD-10-CM

## 2024-05-22 DIAGNOSIS — M25.562 CHRONIC PAIN OF BOTH KNEES: Primary | ICD-10-CM

## 2024-05-22 PROCEDURE — 97110 THERAPEUTIC EXERCISES: CPT | Mod: GP | Performed by: PHYSICAL THERAPIST

## 2024-05-22 NOTE — PROGRESS NOTES
DISCHARGE  Reason for Discharge: Patient chooses to discontinue therapy.    Discharge Plan: Patient to continue home program.    Referring Provider:  Catarino Nicholson       05/22/24 0500   Appointment Info   Signing clinician's name / credentials Refugio Smith PT   Total/Authorized Visits 12   Visits Used 5   Medical Diagnosis M25.561, M25.562, G89.29 (ICD-10-CM) - Chronic pain of both knees   PT Tx Diagnosis functional lower extremity weakness, impairments with balance   Precautions/Limitations none   Quick Adds Certification   Progress Note/Certification   Start of Care Date 09/06/23   Onset of illness/injury or Date of Surgery 08/28/23   Therapy Frequency weekly   Predicted Duration 30 weeks  ([place] for [place])   Certification date from 11/30/23   Certification date to 07/11/24   Progress Note Due Date 07/11/24   Progress Note Completed Date 04/18/24   GOALS   PT Goals 2;3   PT Goal 1   Goal Identifier HEP   Goal Description Patient will demonstrate >50% compliance with home exercise program to promote independence and progress towards   Goal Progress not met   Target Date 10/18/23   PT Goal 2   Goal Identifier 30 sec STS   Goal Description Patient will achieve a score of 9 or greater repetitions in 30 second sit to stand test to reflect normative trunk and lower extremity strength values.   Goal Progress not met   Target Date 11/29/23   PT Goal 3   Goal Identifier single leg balance   Goal Description Patient will improve single leg balance to at least 10 seconds bilaterally to reduce falls risk and improve balance confidence   Goal Progress not met   Target Date 11/29/23   Subjective Report   Subjective Report reports she has been physically active in her garden, knees will be sore afterwards. most concerned about descending stairs and lack of stability.   Treatment Interventions (PT)   Interventions Therapeutic Procedure/Exercise;Self Care/Home Management   Therapeutic  Procedure/Exercise   Therapeutic Procedures: strength, endurance, ROM, flexibility minutes (88085) 25  (patient late for check-in)   Ther Proc 1 - Details treadmill walk  1.6mph performed for 5 minutes (subjective measures gathered). single leg balance 3-5 sec x 5-6 reps each. sit to stands x10 reps. mini squat x 10 reps. standing hip abduction with finger support on wall x 10 reps B   Skilled Intervention Patient was instructed in their home exercise program. Patient performed at least 1 set of each exercise during the session. Cues were provided to ensure proper movement and control. Patient reported appropriate tolerance with each exercise.   Education   Learner/Method Patient   Plan   Plan for next session discharge   Comments   Comments Patient is a 68 year old female returning to PT for follow-up visit upon return from out of state travel. Today's visit was to address ongoing chronic bilateral knee pain (right more painful than left) and functional lower extremity weakness and balance concerns.  The following significant findings have been identified: Pain, Decreased functional strength, Impaired balance, Impaired muscle performance, and Decreased activity tolerance. These impairments interfere with their ability to perform household mobility and community mobility as compared to previous level of function. 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 Patient wishes to discontiune formal PT and work independently on HEP. Patient will therefore be dsicharged at this time.   Total Session Time   Timed Code Treatment Minutes 25   Total Treatment Time (sum of timed and untimed services) 25     Refugio Smith, PT

## 2024-06-09 ASSESSMENT — PATIENT HEALTH QUESTIONNAIRE - PHQ9
SUM OF ALL RESPONSES TO PHQ QUESTIONS 1-9: 1
SUM OF ALL RESPONSES TO PHQ QUESTIONS 1-9: 1
10. IF YOU CHECKED OFF ANY PROBLEMS, HOW DIFFICULT HAVE THESE PROBLEMS MADE IT FOR YOU TO DO YOUR WORK, TAKE CARE OF THINGS AT HOME, OR GET ALONG WITH OTHER PEOPLE: SOMEWHAT DIFFICULT

## 2024-06-10 ENCOUNTER — OFFICE VISIT (OUTPATIENT)
Dept: FAMILY MEDICINE | Facility: CLINIC | Age: 69
End: 2024-06-10
Payer: COMMERCIAL

## 2024-06-10 VITALS
BODY MASS INDEX: 37.77 KG/M2 | RESPIRATION RATE: 15 BRPM | WEIGHT: 247.7 LBS | DIASTOLIC BLOOD PRESSURE: 96 MMHG | OXYGEN SATURATION: 94 % | TEMPERATURE: 97.6 F | SYSTOLIC BLOOD PRESSURE: 132 MMHG | HEART RATE: 64 BPM

## 2024-06-10 DIAGNOSIS — M25.562 ACUTE PAIN OF LEFT KNEE: ICD-10-CM

## 2024-06-10 DIAGNOSIS — M54.42 CHRONIC LEFT-SIDED LOW BACK PAIN WITH LEFT-SIDED SCIATICA: Primary | ICD-10-CM

## 2024-06-10 DIAGNOSIS — G89.29 CHRONIC LEFT-SIDED LOW BACK PAIN WITH LEFT-SIDED SCIATICA: Primary | ICD-10-CM

## 2024-06-10 DIAGNOSIS — I10 ESSENTIAL HYPERTENSION: ICD-10-CM

## 2024-06-10 PROCEDURE — 99214 OFFICE O/P EST MOD 30 MIN: CPT | Performed by: FAMILY MEDICINE

## 2024-06-10 ASSESSMENT — PAIN SCALES - GENERAL: PAINLEVEL: MILD PAIN (3)

## 2024-06-10 NOTE — PROGRESS NOTES
Clarisse Romero  BP (!) 132/96 (BP Location: Right arm, Patient Position: Sitting, Cuff Size: Adult Large)   Pulse 64   Temp 97.6  F (36.4  C) (Temporal)   Resp 15   Wt 112.4 kg (247 lb 11.2 oz)   SpO2 94%   BMI 37.77 kg/m       Assessment/Plan:                Clarisse was seen today for musculoskeletal problem and hypertension.    Diagnoses and all orders for this visit:    Chronic left-sided low back pain with left-sided sciatica  -     Physical Therapy  Referral; Future    Acute pain of left knee  -     Physical Therapy  Referral; Future    Essential hypertension         DISCUSSION  Pain symptoms in the leg have some similarity to a IT band syndrome but I think it is more consistent with a sciatica.  It is somewhat unusual that there is a focus of pain in the posterolateral knee region.  There may be a combination of factors which could be tendinopathy and a sciatica.  Recommended cautious use of ibuprofen on a regular basis to manage pain and reduce inflammation.  Recommended experimenting with ice to see if areas of pain improve with this modality of treatment.  Recommended relative rest and we will get her set up to see physical therapist.  If at any point symptoms worsen we will consider reevaluation and/or formulating a new treatment approach.    For blood pressure continue losartan 100 mg, continue atenolol at current dose.  Monitor blood pressure report readings to me in about 2 weeks decide on further course of action, plan follow-up in August for annual wellness visit.  Subjective:     HPI:    Clarisse Romero is a 69 year old female is here today to discuss blood pressure and left leg pain.    She reports that since January she has had 2 distinct bouts of significant left leg pain.  She reports that while doing some hiking and camping in January she developed pain in the left posterior leg that gradually resolved.  More recently she was at her cabin and doing some gardening outdoors.  She  reports walking on a steep slope with concerns that she was having some toe pain which altered how she walks.  Following this she developed severe pain in the lateral portion of the knee, down into the lower leg and calf region and radiating upward through the posterior upper leg into the buttocks and low back region.  She reports the pain is significant and limiting of activities.  She reports it is a deep aching sensation.  Massaging the area resulted in more discomfort.  She has tried some ibuprofen that helps to a minimal extent.  She does not report weakness or numbness.  Pain is worsened with walking or prolonged sitting.  She does have a history of similar findings on the right leg and had had a surgical intervention for the right side low back in the past.  More recently has been working doing physical therapy for knee pain.    She has hypertension.  She is on atenolol and losartan.  Just 4 days ago losartan dose was increased.  Blood pressure is still higher than ideal but improved from previous numbers.  Discussed continuation of current medication treatment dose following up with reported blood pressures in about 2 weeks.  She will schedule a annual exam for late August when she is due.    ROS:  Complete review of systems is obtained.  Other than the specific considerations noted above complete review of systems is negative.          Objective:   Medications:  Current Outpatient Medications   Medication Sig Dispense Refill    atenolol (TENORMIN) 50 MG tablet Take 1 tablet (50 mg) by mouth daily 90 tablet 3    citalopram (CELEXA) 20 MG tablet Take 1 tablet (20 mg) by mouth daily 90 tablet 3    fluticasone (FLONASE) 50 MCG/ACT nasal spray Inhale 2 sprays in each nostril once daily. 16 g 3    losartan (COZAAR) 100 MG tablet Take 1 tablet (100 mg) by mouth daily 90 tablet 3     No current facility-administered medications for this visit.        Allergies:   No Known Allergies     Social History      Socioeconomic History    Marital status:      Spouse name: Not on file    Number of children: Not on file    Years of education: Not on file    Highest education level: Not on file   Occupational History    Not on file   Tobacco Use    Smoking status: Never     Passive exposure: Never    Smokeless tobacco: Never   Vaping Use    Vaping status: Never Used   Substance and Sexual Activity    Alcohol use: Yes     Comment: Very rarely    Drug use: Never    Sexual activity: Not on file   Other Topics Concern    Not on file   Social History Narrative    Not on file     Social Determinants of Health     Financial Resource Strain: Low Risk  (11/21/2023)    Financial Resource Strain     Within the past 12 months, have you or your family members you live with been unable to get utilities (heat, electricity) when it was really needed?: No   Food Insecurity: Low Risk  (11/21/2023)    Food Insecurity     Within the past 12 months, did you worry that your food would run out before you got money to buy more?: No     Within the past 12 months, did the food you bought just not last and you didn t have money to get more?: No   Transportation Needs: Low Risk  (11/21/2023)    Transportation Needs     Within the past 12 months, has lack of transportation kept you from medical appointments, getting your medicines, non-medical meetings or appointments, work, or from getting things that you need?: No   Physical Activity: Not on file   Stress: Not on file   Social Connections: Not on file   Interpersonal Safety: Low Risk  (6/10/2024)    Interpersonal Safety     Do you feel physically and emotionally safe where you currently live?: Yes     Within the past 12 months, have you been hit, slapped, kicked or otherwise physically hurt by someone?: No     Within the past 12 months, have you been humiliated or emotionally abused in other ways by your partner or ex-partner?: No   Housing Stability: Low Risk  (11/21/2023)    Housing Stability      Do you have housing? : Yes     Are you worried about losing your housing?: No       Family History   Problem Relation Age of Onset    Sleep Apnea Mother     Breast Cancer Maternal Aunt         Most Recent Immunizations   Administered Date(s) Administered    COVID-19 12+ (2023-24) (MODERNA) 09/26/2023    COVID-19 Bivalent 12+ (Pfizer) 11/30/2022    COVID-19 MONOVALENT 12+ (Pfizer) 10/22/2021    COVID-19 Monovalent 12+ (Pfizer 2022) 04/18/2022    DT (PEDS <7y) 04/07/2000    Flu, Unspecified 10/19/2022    Influenza (H1N1) 12/22/2009    Influenza (High Dose) 3 valent vaccine 10/23/2018    Influenza (IIV3) PF 09/30/2013    Influenza Vaccine 18-64 (Flublok) 10/09/2019    Influenza Vaccine 65+ (Fluzone HD) 12/03/2023    Influenza Vaccine >6 months,quad, PF 10/03/2017    Influenza Vaccine, 6+MO IM (QUADRIVALENT W/PRESERVATIVES) 09/29/2016    Influenza, seasonal, injectable, PF 09/17/2010    Pneumo Conj 13-V (2010&after) 08/13/2019    Pneumococcal 23 valent 08/17/2020    RSV Vaccine (Abrysvo) 01/12/2024    TD,PF 7+ (Tenivac) 08/17/2020    TDAP (Adacel,Boostrix) 10/15/2010    Td, Absorbed, Pf, Adult, Lf Unspecified 08/17/2020    Zoster recombinant adjuvanted (SHINGRIX) 11/22/2020    Zoster vaccine, live 08/21/2013        Wt Readings from Last 3 Encounters:   06/10/24 112.4 kg (247 lb 11.2 oz)   04/02/24 113.4 kg (250 lb)   01/12/24 118.2 kg (260 lb 8 oz)        BP Readings from Last 6 Encounters:   06/10/24 (!) 132/96   04/10/24 (!) 166/100   04/02/24 (!) 153/105   01/12/24 (!) 164/93   01/11/24 136/86   12/06/23 (!) 146/103          PHYSICAL EXAM:    BP (!) 132/96 (BP Location: Right arm, Patient Position: Sitting, Cuff Size: Adult Large)   Pulse 64   Temp 97.6  F (36.4  C) (Temporal)   Resp 15   Wt 112.4 kg (247 lb 11.2 oz)   SpO2 94%   BMI 37.77 kg/m       General: Patient no signs of distress    Examination of her leg reveals no bruising redness swelling muscle atrophy.  There is some discomfort reported on  palpation of the lateral aspect of the knee.  No specific joint line tenderness.  No swelling of the knee joint no redness.  Full flexion extension without limitation but does report pain.  She is able to forward flex at the hips with reports of some discomfort into the posterior lateral lower leg as described.  She is able to heel and toe walk but does report discomfort in the area described that worsen slightly.  No apparent weakness.  She can squat return to a standing position which again causes some discomfort in the area described.        Answers submitted by the patient for this visit:  Patient Health Questionnaire (Submitted on 6/9/2024)  If you checked off any problems, how difficult have these problems made it for you to do your work, take care of things at home, or get along with other people?: Somewhat difficult  PHQ9 TOTAL SCORE: 1  General Questionnaire (Submitted on 6/9/2024)  Chief Complaint: Chronic problems general questions HPI Form  What is the reason for your visit today? : Pain in left back of knee and chalf.  How many servings of fruits and vegetables do you eat daily?: 2-3  On average, how many sweetened beverages do you drink each day (Examples: soda, juice, sweet tea, etc.  Do NOT count diet or artificially sweetened beverages)?: 1  How many minutes a day do you exercise enough to make your heart beat faster?: 10 to 19  How many days a week do you exercise enough to make your heart beat faster?: 3 or less  How many days per week do you miss taking your medication?: 0

## 2024-07-02 ENCOUNTER — THERAPY VISIT (OUTPATIENT)
Dept: PHYSICAL THERAPY | Facility: REHABILITATION | Age: 69
End: 2024-07-02
Attending: FAMILY MEDICINE
Payer: COMMERCIAL

## 2024-07-02 DIAGNOSIS — M25.562 ACUTE PAIN OF LEFT KNEE: ICD-10-CM

## 2024-07-02 DIAGNOSIS — M54.42 CHRONIC LEFT-SIDED LOW BACK PAIN WITH LEFT-SIDED SCIATICA: ICD-10-CM

## 2024-07-02 DIAGNOSIS — G89.29 CHRONIC LEFT-SIDED LOW BACK PAIN WITH LEFT-SIDED SCIATICA: ICD-10-CM

## 2024-07-02 PROCEDURE — 97110 THERAPEUTIC EXERCISES: CPT | Mod: GP

## 2024-07-02 PROCEDURE — 97161 PT EVAL LOW COMPLEX 20 MIN: CPT | Mod: GP

## 2024-07-02 NOTE — PROGRESS NOTES
PHYSICAL THERAPY EVALUATION  Type of Visit: Evaluation       Fall Risk Screen:  Have you fallen 2 or more times in the past year?: No  Have you fallen and had an injury in the past year?: No  Is patient a fall risk?: No    Subjective       Presenting condition or subjective complaint: Pain in knee which radiates down leg and upward into hip and buttocks.  Patient presents to PT for left knee pain and back pain that started back in January after hiking.  Patient reports that her left knee pain started in January after hiking and was exacerbated this past spring while pulling weeds doing gardening work.  Patient reports pain along the semitendinosis tendon along the distal insertion.  Patient reports pain is a deep ache that is chronic and oftentimes is 3 out of 10 pain.  She notes that she noticed this pain increased when pivoting in her kitchen.  She saw her provider and notes that the pain could be stemming from her sciatic nerve.  She has been taking ibuprofen which has been helping relive pain.  She used a SEC for about 4 days and the pain got to be significant. She does not use this anymore. Has not had too much difficulty with sleeping.  Date of onset: 01/01/24    Relevant medical history: High blood pressure; Incontinence; Severe headaches; Sleep disorder like apnea   Dates & types of surgery: See chart    Prior diagnostic imaging/testing results:       Prior therapy history for the same diagnosis, illness or injury: No      Prior Level of Function  Transfers: Independent  Ambulation: Independent  ADL: Independent  IADL:     Living Environment  Social support: With a significant other or spouse   Type of home: House; Basement   Stairs to enter the home: Yes 1 Is there a railing: No     Ramp: No   Stairs inside the home: Yes 13 Is there a railing: Yes     Help at home: None  Equipment owned: Fuze     Employment: No    Hobbies/Interests: [place], [place]    Patient goals for  therapy: More activities and walking    Pain assessment: Pain present     Objective   KNEE EVALUATION  PAIN: Pain Quality: Aching and Dull  INTEGUMENTARY (edema, incisions): WNL  POSTURE: Sitting Posture: Rounded shoulders, Forward head, Lordosis decreased, Thoracic kyphosis increased  GAIT:  Weightbearing Status:   Assistive Device(s): None  Gait Deviations: Antalgic  Left toe out, bilateral pes planus  BALANCE/PROPRIOCEPTION:  Will assess next session  ROM:   (Degrees) Left AROM Left PROM  Right AROM Right PROM   Knee Flexion 131 degrees  132 degrees    Knee Extension wnl  wnl    Pain: slight discomfort over the L anterior medial knee  End feel: normal    STRENGTH:   Pain: - none + mild ++ moderate +++ severe  Strength Scale: 0-5/5 Left Right   Hip Flexion 4-, ++ (mod) 5   Hip Extension 5 5   Hip Abduction 5 5   Hip Adduction 5- 5-   Hip Internal Rotation 5- 5   Hip External Rotation 5- 5   Knee Flexion 4+, + (mild) 5   Knee Extension 5 5     FLEXIBILITY:   SPECIAL TESTS:    Left Right   Apley's (Meniscus)     Meaghan's (Meniscus) Positive Negative    Tay's (ITB/TFL)     Patellar Apprehension Test Negative  Negative    Patella Tracking     Ligamentous Stability     Anterior Drawer (ACL) Negative,   Negative,     Posterior Drawer (PCL) Negative,   Negative,     Prone Dial Test at 30 Deg and 90 Deg (PCL/PLC)     Valgus Stress Testing at 0 Deg and 30 Deg Trace,   Negative,     Varus Stress Testing at 0 Deg and 30 Deg  Positive,   Negative,         PALPATION:   + Tenderness At Location Left Right   Medial Joint Line     Lateral Joint Line +    Patellar Tendon     IT Band     Incisional     Popliteal +    Biceps Femoris     Semitendinosis +    Semimembranosis -    Glut Medius     Patellar Medial     Patellar Lateral     Patellar Superior     Patellar Inferior            LUMBAR SPINE EVALUATION  ROM: AROM WFL  PELVIC/SI SCREEN:   STRENGTH:  See chart above    MYOTOMES:  See chart above  DTR S:   CORD SIGNS:    DERMATOMES: WNL  NEURAL TENSION:    Left Right   SLR Negative  Negative    SLR with DF Negative  Negative    Femoral Nerve     Slump Trace Negative    Nathaniel (Lumbar)     Nathaniel (Thoracic)     Nathaniel (Cervical)     Median     Ulnar     Radial        FLEXIBILITY:   LUMBAR/HIP Special Tests:    Left Right   EPIFANIO Trace Negative    FADIR/Labrum/JACKY Negative  Negative    Femoral Nerve     Tay's     Piriformis     Quadrant Testing     SLR Negative  Negative    Slump     Stork with Extension     Scott               PALPATION:   + Tenderness At Location Left Right   Quadratus Lumborum     Erector Spinae     Piriformis  +    PSIS +    ASIS     Iliac Crest     Glut Medius +    Greater Trochanter +    Ischial Tuberosity     Hamstrings +    Hip Flexors     Vertebral          Assessment & Plan   CLINICAL IMPRESSIONS  Medical Diagnosis: Chronic left-sided low back pain with left-sided sciatica, Acute pain of left knee    Treatment Diagnosis: Lumbar pain, weakness, poor posture, knee pain   Impression/Assessment: Patient is a 69 year old female with left knee pain and lumbar complaints.  The following significant findings have been identified: Pain, Decreased ROM/flexibility, Decreased joint mobility, Decreased strength, Impaired balance, Impaired gait, Impaired muscle performance, Decreased activity tolerance, Impaired posture, and Instability. These impairments interfere with their ability to perform self care tasks, work tasks, recreational activities, household chores, driving , household mobility, and community mobility as compared to previous level of function.     Clinical Decision Making (Complexity):  Clinical Presentation: Stable/Uncomplicated  Clinical Presentation Rationale: based on medical and personal factors listed in PT evaluation  Clinical Decision Making (Complexity): Low complexity    PLAN OF CARE  Treatment Interventions:  Modalities: Dry Needling, E-stim  Interventions: Gait Training, Manual Therapy,  Neuromuscular Re-education, Therapeutic Activity, Therapeutic Exercise, Self-Care/Home Management    Long Term Goals     PT Goal 1  Goal Identifier: HEP  Goal Description: Pt will demonstrate understanding and independece of HEP in 30 days.  Rationale: to maximize safety and independence with performance of ADLs and functional tasks  Goal Progress: initial  Target Date: 07/31/24  PT Goal 2  Goal Identifier: JOY  Goal Description: Pt will improve JOY by at least 11 points by the end of therapy in order to demonstrate MCID in score to demonstrate improved functional mobility and improved ADLs.  Goal Progress: initial  Target Date: 09/29/24  PT Goal 3  Goal Identifier: walking  Goal Description: Pt will be able to walk > 30 minutes while reporting < 2/10 symptoms by the end of therapy in order to improve daily functioning.  Goal Progress: initial  Target Date: 09/29/24      Frequency of Treatment: 1 x / week  Duration of Treatment: 90 days    Recommended Referrals to Other Professionals:   Education Assessment:   Learner/Method: Patient;Demonstration    Risks and benefits of evaluation/treatment have been explained.   Patient/Family/caregiver agrees with Plan of Care.     Evaluation Time:     PT Eval, Low Complexity Minutes (50027): 28       Signing Clinician: Angeli Castanon, PT        [facility]                                                                                   OUTPATIENT PHYSICAL THERAPY      PLAN OF TREATMENT FOR OUTPATIENT REHABILITATION   Patient's Last Name, First Name, Clarisse Mix YOB: 1955   Provider's Name   [facility]   Medical Record No.  [id number]     Onset Date: 01/01/24  Start of Care Date: 07/02/24     Medical Diagnosis:  Chronic left-sided low back pain with left-sided sciatica, Acute pain of left knee      PT Treatment Diagnosis:  Lumbar pain, weakness, poor posture, knee pain Plan of  Treatment  Frequency/Duration: 1 x / week/ 90 days    Certification date from 07/02/24 to 09/29/24         See note for plan of treatment details and functional goals     Angeli Castanon, PT                         I CERTIFY THE NEED FOR THESE SERVICES FURNISHED UNDER        THIS PLAN OF TREATMENT AND WHILE UNDER MY CARE     (Physician attestation of this document indicates review and certification of the therapy plan).              Referring Provider:  Catarino Nicholson    Initial Assessment  See Epic Evaluation- Start of Care Date: 07/02/24

## 2024-07-12 ASSESSMENT — MIGRAINE DISABILITY ASSESSMENT (MIDAS)
HOW MANY DAYS DID YOU MISS WORK OR SCHOOL BECAUSE OF HEADACHES: 0
HOW MANY DAYS WAS HOUSEWORK PRODUCTIVITY CUT IN HALF DUE TO HEADACHES: 60
HOW MANY DAYS IN THE PAST 3 MONTHS HAVE YOU HAD A HEADACHE: 60
ON A SCALE FROM 0-10 ON AVERAGE HOW PAINFUL WERE HEADACHES: 3
HOW OFTEN WERE SOCIAL ACTIVITIES MISSED DUE TO HEADACHES: 12
HOW MANY DAYS WAS YOUR PRODUCTIVITY CUT IN HALF BECAUSE OF HEADACHES: 0
TOTAL SCORE: 132
HOW MANY DAYS DID YOU NOT DO HOUSEWORK BECAUSE OF HEADACHES: 60

## 2024-07-12 ASSESSMENT — HEADACHE IMPACT TEST (HIT 6)
HOW OFTEN HAVE YOU FELT TOO TIRED TO WORK BECAUSE OF YOUR HEADACHES: SOMETIMES
WHEN YOU HAVE HEADACHES HOW OFTEN IS THE PAIN SEVERE: SOMETIMES
HOW OFTEN DO HEADACHES LIMIT YOUR DAILY ACTIVITIES: VERY OFTEN
HIT6 TOTAL SCORE: 66
WHEN YOU HAVE A HEADACHE HOW OFTEN DO YOU WISH YOU COULD LIE DOWN: ALWAYS
HOW OFTEN HAVE YOU FELT FED UP OR IRRITATED BECAUSE OF YOUR HEADACHES: VERY OFTEN
HOW OFTEN DID HEADACHS LIMIT CONCENTRATION ON WORK OR DAILY ACTIVITY: VERY OFTEN

## 2024-07-16 ENCOUNTER — TELEPHONE (OUTPATIENT)
Dept: FAMILY MEDICINE | Facility: CLINIC | Age: 69
End: 2024-07-16

## 2024-07-16 ENCOUNTER — OFFICE VISIT (OUTPATIENT)
Dept: NEUROLOGY | Facility: CLINIC | Age: 69
End: 2024-07-16
Attending: STUDENT IN AN ORGANIZED HEALTH CARE EDUCATION/TRAINING PROGRAM
Payer: COMMERCIAL

## 2024-07-16 VITALS
DIASTOLIC BLOOD PRESSURE: 96 MMHG | HEART RATE: 65 BPM | BODY MASS INDEX: 37.44 KG/M2 | HEIGHT: 68 IN | WEIGHT: 247 LBS | SYSTOLIC BLOOD PRESSURE: 150 MMHG | OXYGEN SATURATION: 97 %

## 2024-07-16 DIAGNOSIS — G43.909 MIGRAINE WITHOUT STATUS MIGRAINOSUS, NOT INTRACTABLE, UNSPECIFIED MIGRAINE TYPE: ICD-10-CM

## 2024-07-16 PROCEDURE — 99204 OFFICE O/P NEW MOD 45 MIN: CPT

## 2024-07-16 NOTE — TELEPHONE ENCOUNTER
Patient has been keeping track of her blood pressure -   And would like for you to see it.   7/10/24 - 160/104     2:13pm   7/11/24 - 157/106 - no BP meds -  10:49 am  7/12/24 - 147/101 - pulse 73  7:14 am  7/12/24 - 152/99 - pulse 60   2:22pm   7/13/24 - 160/107 - no BP meds - pulse 70 - 7:41pm   7/15/24 - 142/97 pulse 68   9:30am
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
## 2024-07-16 NOTE — NURSING NOTE
"Clarisse Romero is a 69 year old female who presents for:  Chief Complaint   Patient presents with    Headache     headache Ref Alvaro Hand MD in [facility]      Patient is aware of high BP. Working with PCP on BP.   - denies any symptoms: lightheaded, dizzy, notes sleepy.    Initial Vitals:  Pulse 65   Ht 1.725 m (5' 7.9\")   Wt 112 kg (247 lb)   SpO2 94%   BMI 37.67 kg/m   Estimated body mass index is 37.67 kg/m  as calculated from the following:    Height as of this encounter: 1.725 m (5' 7.9\").    Weight as of this encounter: 112 kg (247 lb).. Body surface area is 2.32 meters squared. BP completed using cuff size: vicki Barry CMA    "

## 2024-07-17 ENCOUNTER — THERAPY VISIT (OUTPATIENT)
Dept: PHYSICAL THERAPY | Facility: REHABILITATION | Age: 69
End: 2024-07-17
Payer: COMMERCIAL

## 2024-07-17 DIAGNOSIS — G89.29 CHRONIC LEFT-SIDED LOW BACK PAIN WITH LEFT-SIDED SCIATICA: Primary | ICD-10-CM

## 2024-07-17 DIAGNOSIS — M54.42 CHRONIC LEFT-SIDED LOW BACK PAIN WITH LEFT-SIDED SCIATICA: Primary | ICD-10-CM

## 2024-07-17 DIAGNOSIS — M25.562 ACUTE PAIN OF LEFT KNEE: ICD-10-CM

## 2024-07-17 PROCEDURE — 97110 THERAPEUTIC EXERCISES: CPT | Mod: GP

## 2024-07-24 ENCOUNTER — THERAPY VISIT (OUTPATIENT)
Dept: PHYSICAL THERAPY | Facility: REHABILITATION | Age: 69
End: 2024-07-24
Payer: COMMERCIAL

## 2024-07-24 DIAGNOSIS — G89.29 CHRONIC LEFT-SIDED LOW BACK PAIN WITH LEFT-SIDED SCIATICA: Primary | ICD-10-CM

## 2024-07-24 DIAGNOSIS — M25.562 ACUTE PAIN OF LEFT KNEE: ICD-10-CM

## 2024-07-24 DIAGNOSIS — M54.42 CHRONIC LEFT-SIDED LOW BACK PAIN WITH LEFT-SIDED SCIATICA: Primary | ICD-10-CM

## 2024-07-24 PROCEDURE — 97110 THERAPEUTIC EXERCISES: CPT | Mod: GP

## 2024-07-26 PROBLEM — K63.5 POLYP OF COLON: Status: RESOLVED | Noted: 2019-11-12 | Resolved: 2024-07-26

## 2024-07-29 ENCOUNTER — OFFICE VISIT (OUTPATIENT)
Dept: FAMILY MEDICINE | Facility: CLINIC | Age: 69
End: 2024-07-29
Payer: COMMERCIAL

## 2024-07-29 ENCOUNTER — THERAPY VISIT (OUTPATIENT)
Dept: PHYSICAL THERAPY | Facility: REHABILITATION | Age: 69
End: 2024-07-29
Payer: COMMERCIAL

## 2024-07-29 VITALS
RESPIRATION RATE: 18 BRPM | WEIGHT: 251 LBS | OXYGEN SATURATION: 95 % | DIASTOLIC BLOOD PRESSURE: 84 MMHG | HEART RATE: 65 BPM | HEIGHT: 68 IN | SYSTOLIC BLOOD PRESSURE: 142 MMHG | TEMPERATURE: 98.3 F | BODY MASS INDEX: 38.04 KG/M2

## 2024-07-29 DIAGNOSIS — G89.29 CHRONIC PAIN OF BOTH ANKLES: ICD-10-CM

## 2024-07-29 DIAGNOSIS — M54.42 CHRONIC LEFT-SIDED LOW BACK PAIN WITH LEFT-SIDED SCIATICA: Primary | ICD-10-CM

## 2024-07-29 DIAGNOSIS — L60.0 INGROWN TOENAIL OF LEFT FOOT: ICD-10-CM

## 2024-07-29 DIAGNOSIS — M25.572 CHRONIC PAIN OF BOTH ANKLES: ICD-10-CM

## 2024-07-29 DIAGNOSIS — I10 ESSENTIAL HYPERTENSION: Primary | ICD-10-CM

## 2024-07-29 DIAGNOSIS — M25.571 CHRONIC PAIN OF BOTH ANKLES: ICD-10-CM

## 2024-07-29 DIAGNOSIS — M25.562 ACUTE PAIN OF LEFT KNEE: ICD-10-CM

## 2024-07-29 DIAGNOSIS — G89.29 CHRONIC LEFT-SIDED LOW BACK PAIN WITH LEFT-SIDED SCIATICA: Primary | ICD-10-CM

## 2024-07-29 PROCEDURE — 99214 OFFICE O/P EST MOD 30 MIN: CPT | Performed by: FAMILY MEDICINE

## 2024-07-29 PROCEDURE — 97140 MANUAL THERAPY 1/> REGIONS: CPT | Mod: GP

## 2024-07-29 PROCEDURE — 97110 THERAPEUTIC EXERCISES: CPT | Mod: GP

## 2024-07-29 RX ORDER — HYDROCHLOROTHIAZIDE 12.5 MG/1
12.5 TABLET ORAL DAILY
Qty: 30 TABLET | Refills: 3 | Status: SHIPPED | OUTPATIENT
Start: 2024-07-29 | End: 2024-08-30

## 2024-07-29 ASSESSMENT — PAIN SCALES - GENERAL: PAINLEVEL: MILD PAIN (3)

## 2024-07-29 NOTE — PROGRESS NOTES
"Clarisse Romero  BP (!) 142/84   Pulse 65   Temp 98.3  F (36.8  C)   Resp 18   Ht 1.727 m (5' 8\")   Wt 113.9 kg (251 lb)   SpO2 95%   BMI 38.16 kg/m       Assessment/Plan:                Clarisse was seen today for recheck medication, hypertension and toenail.    Diagnoses and all orders for this visit:    Essential hypertension  -     hydroCHLOROthiazide 12.5 MG tablet; Take 1 tablet (12.5 mg) by mouth daily    Ingrown toenail of left foot  -     Orthopedic  Referral; Future    Chronic pain of both ankles  -     Physical Therapy  Referral; Future         DISCUSSION  See orders above and discussion below.  Subjective:     HPI:    Clarisse Romero is a 69 year old female is here to discuss ingrown toenail, ankle pain and blood pressure.    She has hypertension she is on losartan and atenolol.  After recent adjustment in losartan dose blood pressure only minimally improved.  She is still hypertensive based on readings obtained here as well as at home.  Discussed addition of new medication, specifically discussed hydrochlorothiazide.  Patient agreeable to starting new medication.  Will begin 12.5 mg daily.  Monitor blood pressure over the next couple of weeks report readings to me.  We will plan short-term recheck of electrolytes later this summer.    She is chronic ingrown toenail most bothersome on the right great toe.  Has done daily soaks and has worked with a nail care provider in terms of trimming as well as possible.  Despite this still having a lot of issues with irritation and sometimes infection although no infections there currently.  Discussed referral to podiatry for consideration of nail resection for more permanent treatment course for her chronic recurrent issue.    Patient reports mild ankle pain.  She without the leg pains has responded well to physical therapy she would like to try physical therapy to help with ankle pain management.    ROS:  Complete review of systems is obtained.  " Other than the specific considerations noted above complete review of systems is negative.          Objective:   Medications:  Current Outpatient Medications   Medication Sig Dispense Refill    atenolol (TENORMIN) 50 MG tablet Take 1 tablet (50 mg) by mouth daily 90 tablet 3    citalopram (CELEXA) 20 MG tablet Take 1 tablet (20 mg) by mouth daily 90 tablet 3    fluticasone (FLONASE) 50 MCG/ACT nasal spray Inhale 2 sprays in each nostril once daily. 16 g 3    hydroCHLOROthiazide 12.5 MG tablet Take 1 tablet (12.5 mg) by mouth daily 30 tablet 3    losartan (COZAAR) 100 MG tablet Take 1 tablet (100 mg) by mouth daily 90 tablet 3     No current facility-administered medications for this visit.        Allergies:   No Known Allergies     Social History     Socioeconomic History    Marital status:      Spouse name: Not on file    Number of children: Not on file    Years of education: Not on file    Highest education level: Not on file   Occupational History    Not on file   Tobacco Use    Smoking status: Never     Passive exposure: Never    Smokeless tobacco: Never   Vaping Use    Vaping status: Never Used   Substance and Sexual Activity    Alcohol use: Yes     Comment: Very rarely    Drug use: Never    Sexual activity: Not on file   Other Topics Concern    Not on file   Social History Narrative    Not on file     Social Determinants of Health     Financial Resource Strain: Low Risk  (11/21/2023)    Financial Resource Strain     Within the past 12 months, have you or your family members you live with been unable to get utilities (heat, electricity) when it was really needed?: No   Food Insecurity: Low Risk  (11/21/2023)    Food Insecurity     Within the past 12 months, did you worry that your food would run out before you got money to buy more?: No     Within the past 12 months, did the food you bought just not last and you didn t have money to get more?: No   Transportation Needs: Low Risk  (11/21/2023)     Transportation Needs     Within the past 12 months, has lack of transportation kept you from medical appointments, getting your medicines, non-medical meetings or appointments, work, or from getting things that you need?: No   Physical Activity: Not on file   Stress: Not on file   Social Connections: Not on file   Interpersonal Safety: Low Risk  (6/10/2024)    Interpersonal Safety     Do you feel physically and emotionally safe where you currently live?: Yes     Within the past 12 months, have you been hit, slapped, kicked or otherwise physically hurt by someone?: No     Within the past 12 months, have you been humiliated or emotionally abused in other ways by your partner or ex-partner?: No   Housing Stability: Low Risk  (11/21/2023)    Housing Stability     Do you have housing? : Yes     Are you worried about losing your housing?: No       Family History   Problem Relation Age of Onset    Sleep Apnea Mother     Breast Cancer Maternal Aunt         Most Recent Immunizations   Administered Date(s) Administered    COVID-19 12+ (2023-24) (MODERNA) 09/26/2023    COVID-19 Bivalent 12+ (Pfizer) 11/30/2022    COVID-19 MONOVALENT 12+ (Pfizer) 10/22/2021    COVID-19 Monovalent 12+ (Pfizer 2022) 04/18/2022    DT (PEDS <7y) 04/07/2000    Flu, Unspecified 10/19/2022    Influenza (H1N1) 12/22/2009    Influenza (High Dose) 3 valent vaccine 10/23/2018    Influenza (IIV3) PF 09/30/2013    Influenza Vaccine 18-64 (Flublok) 10/09/2019    Influenza Vaccine 65+ (Fluzone HD) 12/03/2023    Influenza Vaccine >6 months,quad, PF 10/03/2017    Influenza Vaccine, 6+MO IM (QUADRIVALENT W/PRESERVATIVES) 09/29/2016    Influenza, seasonal, injectable, PF 09/17/2010    Pneumo Conj 13-V (2010&after) 08/13/2019    Pneumococcal 23 valent 08/17/2020    RSV Vaccine (Abrysvo) 01/12/2024    TD,PF 7+ (Tenivac) 08/17/2020    TDAP (Adacel,Boostrix) 10/15/2010    Td, Absorbed, Pf, Adult, Lf Unspecified 08/17/2020    Zoster recombinant adjuvanted (SHINGRIX)  "11/22/2020    Zoster vaccine, live 08/21/2013        Wt Readings from Last 3 Encounters:   07/29/24 113.9 kg (251 lb)   07/16/24 112 kg (247 lb)   06/10/24 112.4 kg (247 lb 11.2 oz)        BP Readings from Last 6 Encounters:   07/29/24 (!) 142/84   07/16/24 (!) 150/96   06/10/24 (!) 132/96   04/10/24 (!) 166/100   04/02/24 (!) 153/105   01/12/24 (!) 164/93        No results found for: \"A1C\", \"HEMOGLOBINA1\"           PHYSICAL EXAM:    BP (!) 142/84   Pulse 65   Temp 98.3  F (36.8  C)   Resp 18   Ht 1.727 m (5' 8\")   Wt 113.9 kg (251 lb)   SpO2 95%   BMI 38.16 kg/m       General: Patient no signs of distress    Examination of her toe reveals ingrown toenail without signs of infection on the right great toe.                          Answers submitted by the patient for this visit:  General Questionnaire (Submitted on 7/24/2024)  Chief Complaint: Chronic problems general questions HPI Form  How many servings of fruits and vegetables do you eat daily?: 4 or more  On average, how many sweetened beverages do you drink each day (Examples: soda, juice, sweet tea, etc.  Do NOT count diet or artificially sweetened beverages)?: 0  How many minutes a day do you exercise enough to make your heart beat faster?: 20 to 29  How many days a week do you exercise enough to make your heart beat faster?: 4  How many days per week do you miss taking your medication?: 0  General Concern (Submitted on 7/24/2024)  Chief Complaint: Chronic problems general questions HPI Form  What is the reason for your visit today?: Infected lg toenail on left foot  When did your symptoms begin?: 3-4 weeks ago  What are your symptoms?: Pain, redness, puss, shedding of skin from the left side of toe  How would you describe these symptoms?: Mild  Are your symptoms:: Staying the same  Have you had these symptoms before?: Yes  Have you tried or received treatment for these symptoms before?: No  Is there anything that makes you feel worse?: No  Is there " anything that makes you feel better?: Soaking foot in stevie dish soap

## 2024-08-06 ENCOUNTER — THERAPY VISIT (OUTPATIENT)
Dept: PHYSICAL THERAPY | Facility: REHABILITATION | Age: 69
End: 2024-08-06
Payer: COMMERCIAL

## 2024-08-06 DIAGNOSIS — M25.562 ACUTE PAIN OF LEFT KNEE: ICD-10-CM

## 2024-08-06 DIAGNOSIS — G89.29 CHRONIC LEFT-SIDED LOW BACK PAIN WITH LEFT-SIDED SCIATICA: Primary | ICD-10-CM

## 2024-08-06 DIAGNOSIS — M54.42 CHRONIC LEFT-SIDED LOW BACK PAIN WITH LEFT-SIDED SCIATICA: Primary | ICD-10-CM

## 2024-08-06 PROCEDURE — 97140 MANUAL THERAPY 1/> REGIONS: CPT | Mod: GP

## 2024-08-06 PROCEDURE — 97110 THERAPEUTIC EXERCISES: CPT | Mod: GP

## 2024-08-25 SDOH — HEALTH STABILITY: PHYSICAL HEALTH: ON AVERAGE, HOW MANY MINUTES DO YOU ENGAGE IN EXERCISE AT THIS LEVEL?: 0 MIN

## 2024-08-25 SDOH — HEALTH STABILITY: PHYSICAL HEALTH: ON AVERAGE, HOW MANY DAYS PER WEEK DO YOU ENGAGE IN MODERATE TO STRENUOUS EXERCISE (LIKE A BRISK WALK)?: 0 DAYS

## 2024-08-25 ASSESSMENT — SOCIAL DETERMINANTS OF HEALTH (SDOH): HOW OFTEN DO YOU GET TOGETHER WITH FRIENDS OR RELATIVES?: MORE THAN THREE TIMES A WEEK

## 2024-08-27 ENCOUNTER — THERAPY VISIT (OUTPATIENT)
Dept: PHYSICAL THERAPY | Facility: REHABILITATION | Age: 69
End: 2024-08-27
Payer: COMMERCIAL

## 2024-08-27 DIAGNOSIS — M54.42 CHRONIC LEFT-SIDED LOW BACK PAIN WITH LEFT-SIDED SCIATICA: Primary | ICD-10-CM

## 2024-08-27 DIAGNOSIS — M25.562 ACUTE PAIN OF LEFT KNEE: ICD-10-CM

## 2024-08-27 DIAGNOSIS — G89.29 CHRONIC LEFT-SIDED LOW BACK PAIN WITH LEFT-SIDED SCIATICA: Primary | ICD-10-CM

## 2024-08-27 PROCEDURE — 97110 THERAPEUTIC EXERCISES: CPT | Mod: GP

## 2024-08-30 ENCOUNTER — OFFICE VISIT (OUTPATIENT)
Dept: FAMILY MEDICINE | Facility: CLINIC | Age: 69
End: 2024-08-30
Payer: COMMERCIAL

## 2024-08-30 VITALS
DIASTOLIC BLOOD PRESSURE: 80 MMHG | WEIGHT: 251.7 LBS | RESPIRATION RATE: 18 BRPM | TEMPERATURE: 98.2 F | HEART RATE: 64 BPM | HEIGHT: 68 IN | OXYGEN SATURATION: 95 % | SYSTOLIC BLOOD PRESSURE: 136 MMHG | BODY MASS INDEX: 38.15 KG/M2

## 2024-08-30 DIAGNOSIS — M25.562 CHRONIC PAIN OF BOTH KNEES: ICD-10-CM

## 2024-08-30 DIAGNOSIS — G89.29 CHRONIC PAIN OF BOTH KNEES: ICD-10-CM

## 2024-08-30 DIAGNOSIS — I10 ESSENTIAL HYPERTENSION: ICD-10-CM

## 2024-08-30 DIAGNOSIS — R73.01 ELEVATED FASTING BLOOD SUGAR: ICD-10-CM

## 2024-08-30 DIAGNOSIS — F32.A DEPRESSION, UNSPECIFIED DEPRESSION TYPE: ICD-10-CM

## 2024-08-30 DIAGNOSIS — E78.5 DYSLIPIDEMIA: ICD-10-CM

## 2024-08-30 DIAGNOSIS — M25.561 CHRONIC PAIN OF BOTH KNEES: ICD-10-CM

## 2024-08-30 DIAGNOSIS — Z00.00 ENCOUNTER FOR MEDICARE ANNUAL WELLNESS EXAM: Primary | ICD-10-CM

## 2024-08-30 DIAGNOSIS — G47.33 OSA (OBSTRUCTIVE SLEEP APNEA): ICD-10-CM

## 2024-08-30 LAB
ALBUMIN SERPL BCG-MCNC: 4.4 G/DL (ref 3.5–5.2)
ALP SERPL-CCNC: 89 U/L (ref 40–150)
ALT SERPL W P-5'-P-CCNC: 22 U/L (ref 0–50)
ANION GAP SERPL CALCULATED.3IONS-SCNC: 9 MMOL/L (ref 7–15)
AST SERPL W P-5'-P-CCNC: 25 U/L (ref 0–45)
BILIRUB SERPL-MCNC: 1 MG/DL
BUN SERPL-MCNC: 12.3 MG/DL (ref 8–23)
CALCIUM SERPL-MCNC: 10 MG/DL (ref 8.8–10.4)
CHLORIDE SERPL-SCNC: 105 MMOL/L (ref 98–107)
CHOLEST SERPL-MCNC: 202 MG/DL
CREAT SERPL-MCNC: 0.76 MG/DL (ref 0.51–0.95)
EGFRCR SERPLBLD CKD-EPI 2021: 83 ML/MIN/1.73M2
FASTING STATUS PATIENT QL REPORTED: YES
FASTING STATUS PATIENT QL REPORTED: YES
GLUCOSE SERPL-MCNC: 116 MG/DL (ref 70–99)
HCO3 SERPL-SCNC: 26 MMOL/L (ref 22–29)
HDLC SERPL-MCNC: 41 MG/DL
HOLD SPECIMEN: NORMAL
HOLD SPECIMEN: NORMAL
LDLC SERPL CALC-MCNC: 135 MG/DL
NONHDLC SERPL-MCNC: 161 MG/DL
POTASSIUM SERPL-SCNC: 4.5 MMOL/L (ref 3.4–5.3)
PROT SERPL-MCNC: 7.2 G/DL (ref 6.4–8.3)
SODIUM SERPL-SCNC: 140 MMOL/L (ref 135–145)
TRIGL SERPL-MCNC: 132 MG/DL

## 2024-08-30 PROCEDURE — G0439 PPPS, SUBSEQ VISIT: HCPCS | Performed by: FAMILY MEDICINE

## 2024-08-30 PROCEDURE — 83036 HEMOGLOBIN GLYCOSYLATED A1C: CPT | Performed by: FAMILY MEDICINE

## 2024-08-30 PROCEDURE — 99214 OFFICE O/P EST MOD 30 MIN: CPT | Mod: 25 | Performed by: FAMILY MEDICINE

## 2024-08-30 PROCEDURE — 80061 LIPID PANEL: CPT | Performed by: FAMILY MEDICINE

## 2024-08-30 PROCEDURE — 80053 COMPREHEN METABOLIC PANEL: CPT | Performed by: FAMILY MEDICINE

## 2024-08-30 PROCEDURE — 36415 COLL VENOUS BLD VENIPUNCTURE: CPT | Performed by: FAMILY MEDICINE

## 2024-08-30 RX ORDER — CITALOPRAM HYDROBROMIDE 20 MG/1
20 TABLET ORAL DAILY
Qty: 90 TABLET | Refills: 3 | Status: SHIPPED | OUTPATIENT
Start: 2024-08-30

## 2024-08-30 RX ORDER — HYDROCHLOROTHIAZIDE 25 MG/1
25 TABLET ORAL DAILY
Qty: 90 TABLET | Refills: 3 | Status: SHIPPED | OUTPATIENT
Start: 2024-08-30

## 2024-08-30 ASSESSMENT — ACTIVITIES OF DAILY LIVING (ADL)
WALKING_OR_CLIMBING_STAIRS_DIFFICULTY: NO
DRESSING/BATHING_DIFFICULTY: NO
CHANGE_IN_FUNCTIONAL_STATUS_SINCE_ONSET_OF_CURRENT_ILLNESS/INJURY: NO
DOING_ERRANDS_INDEPENDENTLY_DIFFICULTY: NO
WEAR_GLASSES_OR_BLIND: YES
DIFFICULTY_EATING/SWALLOWING: NO
HEARING_DIFFICULTY_OR_DEAF: NO
CONCENTRATING,_REMEMBERING_OR_MAKING_DECISIONS_DIFFICULTY: NO
FALL_HISTORY_WITHIN_LAST_SIX_MONTHS: NO
DIFFICULTY_COMMUNICATING: NO
TOILETING_ISSUES: NO

## 2024-08-30 ASSESSMENT — PAIN SCALES - GENERAL: PAINLEVEL: NO PAIN (0)

## 2024-08-30 NOTE — PROGRESS NOTES
"Preventive Care Visit  [facility]  Catarino Nicholson MD, MD, Family Medicine  Aug 30, 2024      Assessment & Plan     Clarisse was seen today for recheck medication, wellness visit and hypertension.    Diagnoses and all orders for this visit:    Encounter for Medicare annual wellness exam  -     Extra Tube; Future  -     Extra Tube    Essential hypertension  -     Comprehensive metabolic panel (BMP + Alb, Alk Phos, ALT, AST, Total. Bili, TP)  -     hydrochlorothiazide (HYDRODIURIL) 25 MG tablet; Take 1 tablet (25 mg) by mouth daily.  -     Extra Tube; Future  -     Extra Tube    AMINATA (obstructive sleep apnea)  -     Extra Tube; Future  -     Extra Tube    Depression, unspecified depression type  -     citalopram (CELEXA) 20 MG tablet; Take 1 tablet (20 mg) by mouth daily.  -     Extra Tube; Future  -     Extra Tube    Chronic pain of both knees  -     Orthopedic  Referral; Future  -     Extra Tube; Future  -     Extra Tube    Dyslipidemia  -     Lipid panel reflex to direct LDL Fasting        BMI  Estimated body mass index is 38.27 kg/m  as calculated from the following:    Height as of this encounter: 1.727 m (5' 8\").    Weight as of this encounter: 114.2 kg (251 lb 11.2 oz).   Weight management plan: Discussed healthy diet and exercise guidelines    Counseling  Appropriate preventive services were addressed with this patient via screening, questionnaire, or discussion as appropriate for fall prevention, nutrition, physical activity, Tobacco-use cessation, social engagement, weight loss and cognition.  Checklist reviewing preventive services available has been given to the patient.  Reviewed patient's diet, addressing concerns and/or questions.   She is at risk for psychosocial distress and has been provided with information to reduce risk.   The patient was provided with written information regarding signs of hearing loss.   Information on urinary incontinence and treatment options given " to patient.           Oziel   Clarisse is a 69 year old, presenting for the following:  Recheck Medication, Wellness Visit, and Hypertension    She has hypertension.  Had recent adjustments to medication blood pressure improved but still higher than ideal discussed increasing hydrochlorothiazide dose to gain better control.  Discussed obtaining labs today.      She has obstructive sleep apnea.  She uses CPAP.  There was a recent overnight oximetry performed by her sleep specialist she brings the report with her.  We discussed that she should discuss this further with her sleep specialist.    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  She is doing some physical therapy which is helping to improve overall leg pain and mobility.  She would like to see an orthopedic specialist at this point we discussed a referral.    She has a history of depression is on citalopram.  PHQ-9 is 0.  Overall doing well no indication for any changes.    She has dyslipidemia she is not on medication therapy discussed reassessment of cholesterol.    Reviewed routine preventive measures discussed breast cancer screening she would like to stay on an every 2-year mammography schedule which I think is reasonable based on her overall assessment of her risk.  She is coming due for a colonoscopy this fall she has not scheduled.  We discussed appropriate immunizations.  She had a DEXA scan in 2021 that was normal we have elected to consider repeat in 5 years from that date.                     8/30/2024     9:53 AM   Additional Questions    am cma     Health Care Directive  Patient does not have a Health Care Directive or Living Will: Advance Directive received and scanned. Click on Code in the patient header to view.    Answers submitted by the patient for this visit:  Patient Health Questionnaire (Submitted on 8/30/2024)  If you checked off any problems, how difficult have these problems made it for you to do your  work, take care of things at home, or get along with other people?: Not difficult at all  PHQ9 TOTAL SCORE: 0    Do you have a current opioid prescription? no  Do you use any other controlled substances or medications that are not prescribed by a provider? no             8/25/2024   General Health   How would you rate your overall physical health? Excellent   Feel stress (tense, anxious, or unable to sleep) Only a little      (!) STRESS CONCERN      8/25/2024   Nutrition   Diet: Regular (no restrictions)            8/25/2024   Exercise   Days per week of moderate/strenous exercise 0 days   Average minutes spent exercising at this level 0 min      (!) EXERCISE CONCERN      8/25/2024   Social Factors   Frequency of gathering with friends or relatives More than three times a week   Worry food won't last until get money to buy more No   Food not last or not have enough money for food? No   Do you have housing? (Housing is defined as stable permanent housing and does not include staying ouside in a car, in a tent, in an abandoned building, in an overnight shelter, or couch-surfing.) Yes   Are you worried about losing your housing? No   Lack of transportation? No   Unable to get utilities (heat,electricity)? No            8/30/2024   Fall Risk   Fallen 2 or more times in the past year? No    No   Trouble with walking or balance? No    No       Multiple values from one day are sorted in reverse-chronological order          8/25/2024   Activities of Daily Living- Home Safety   Needs help with the following daily activites None of the above   Safety concerns in the home None of the above            8/25/2024   Dental   Dentist two times every year? Yes            8/25/2024   Hearing Screening   Hearing concerns? (!) I NEED TO ASK PEOPLE TO SPEAK UP OR REPEAT THEMSELVES.            8/25/2024   Driving Risk Screening   Patient/family members have concerns about driving No            8/25/2024   General Alertness/Fatigue  Screening   Have you been more tired than usual lately? No            8/25/2024   Urinary Incontinence Screening   Bothered by leaking urine in past 6 months Yes            8/25/2024   TB Screening   Were you born outside of the [place]? No          Today's PHQ-9 Score:       8/30/2024     9:53 AM   PHQ-9 SCORE   PHQ-9 Total Score MyChart 0   PHQ-9 Total Score 0         8/25/2024   Substance Use   Alcohol more than 3/day or more than 7/wk No   Do you have a current opioid prescription? No   How severe/bad is pain from 1 to 10? 1/10   Do you use any other substances recreationally? No        Social History     Tobacco Use    Smoking status: Never     Passive exposure: Never    Smokeless tobacco: Never   Vaping Use    Vaping status: Never Used   Substance Use Topics    Alcohol use: Yes     Comment: Very rarely    Drug use: Never           8/19/2022   LAST FHS-7 RESULTS   1st degree relative breast or ovarian cancer Unknown   Any relative bilateral breast cancer No   Any male have breast cancer No   Any ONE woman have BOTH breast AND ovarian cancer Yes   Any woman with breast cancer before 50yrs No   2 or more relatives with breast AND/OR ovarian cancer No   2 or more relatives with breast AND/OR bowel cancer Yes               ASCVD Risk   The 10-year ASCVD risk score (Debbi ANDRADE, et al., 2019) is: 16.2%    Values used to calculate the score:      Age: 69 years      Sex: Female      Is Non- : No      Diabetic: No      Tobacco smoker: No      Systolic Blood Pressure: 136 mmHg      Is BP treated: Yes      HDL Cholesterol: 41 mg/dL      Total Cholesterol: 196 mg/dL    Wt Readings from Last 3 Encounters:   08/30/24 114.2 kg (251 lb 11.2 oz)   07/29/24 113.9 kg (251 lb)   07/16/24 112 kg (247 lb)        BP Readings from Last 6 Encounters:   08/30/24 136/80   07/29/24 (!) 142/84   07/16/24 (!) 150/96   06/10/24 (!) 132/96   04/10/24 (!) 166/100   04/02/24 (!) 153/105                Reviewed and  "updated as needed this visit by Provider                      Current providers sharing in care for this patient include:  Patient Care Team:  Catarino Nicholson MD as PCP - General (Family Practice)  Catarino Nicholson MD as Assigned PCP  Alvaro Hand MD as Physician  Alvaro Hand MD as Assigned Surgical Provider  Sofy Tripathi PA-C as Physician Assistant  Beto Hernandez MD as Assigned Sleep Provider  Sofy Tripathi PA-C as Assigned Neuroscience Provider    The following health maintenance items are reviewed in Epic and correct as of today:  Health Maintenance   Topic Date Due    DEPRESSION ACTION PLAN  Never done    MEDICARE ANNUAL WELLNESS VISIT  08/28/2024    COVID-19 Vaccine (8 - 2023-24 season) 10/01/2024 (Originally 1/26/2024)    INFLUENZA VACCINE (1) 09/01/2024    ANNUAL REVIEW OF HM ORDERS  11/22/2024    PHQ-9  02/28/2025    MAMMO SCREENING  08/09/2025    FALL RISK ASSESSMENT  08/30/2025    GLUCOSE  08/28/2026    LIPID  08/28/2028    ADVANCE CARE PLANNING  08/28/2028    COLORECTAL CANCER SCREENING  11/12/2029    DTAP/TDAP/TD IMMUNIZATION (4 - Td or Tdap) 08/17/2030    DEXA  08/26/2036    HEPATITIS C SCREENING  Completed    Pneumococcal Vaccine: 65+ Years  Completed    ZOSTER IMMUNIZATION  Completed    RSV VACCINE  Completed    HPV IMMUNIZATION  Aged Out    MENINGITIS IMMUNIZATION  Aged Out    RSV MONOCLONAL ANTIBODY  Aged Out       Complete review of systems is obtained.  Other than the specific considerations noted above complete review of systems is negative.       Objective    Exam  /80   Pulse 64   Temp 98.2  F (36.8  C)   Resp 18   Ht 1.727 m (5' 8\")   Wt 114.2 kg (251 lb 11.2 oz)   SpO2 95%   BMI 38.27 kg/m     Estimated body mass index is 38.27 kg/m  as calculated from the following:    Height as of this encounter: 1.727 m (5' 8\").    Weight as of this encounter: 114.2 kg (251 lb 11.2 oz).    Physical Exam        General Appearance:    Alert, cooperative, " no distress   Eyes:   No scleral icterus or conjunctival irritation       Ears:    Normal TM's and external ear canals, both ears   Throat:   Lips, mucosa, and tongue normal; teeth and gums normal   Neck:   Supple, symmetrical, trachea midline, no adenopathy;        thyroid:  No enlargement/tenderness/nodules   Lungs:     Clear to auscultation bilaterally, respirations unlabored, no wheezes or crackles   Heart:    Regular rate and rhythm,  No murmur   Extremities:  No edema, no joint swelling or redness, no evidence of any injuries   Skin:  No concerning skin findings, no suspicious moles, no rashes   Neurologic:  On gross examination there is no motor or sensory deficit.  Patient walks with a normal gait                   8/30/2024   Mini Cog   Clock Draw Score 2 Normal   3 Item Recall 3 objects recalled   Mini Cog Total Score 5                 Signed Electronically by: Catarino Nicholson MD, MD

## 2024-08-30 NOTE — LETTER
September 6, 2024      Clarisse Romero  [address] [address]        Dear ,    We are writing to inform you of your test results.    The electrolytes kidney function and liver function tests are normal.     The fasting blood sugar called glucose was higher than ideal at 116.  A follow-up test called a hemoglobin A1c indicates that on average your blood sugar is higher than ideal but fortunately you do not have diabetes.  Work to try to reduce sugar and carbohydrates in your diet along with getting regular exercise.  We should recheck the hemoglobin A1c in about 6 months to make sure it does not continue to increase.     Your cholesterol numbers remain elevated, the numbers are similar to previous test results.  Based on your cholesterol numbers, age and gender your risk for vascular disease is high enough to warrant consideration of medication therapy.  Starting a cholesterol-lowering medication called a statin can help lower your vascular disease risk significantly.  Based on the current information combined with previous numbers we should start medication at this time.      Another option before considering medication would be to pursue a test called a coronary calcium score.  A coronary calcium score involves having a CAT scan or CT scan which is a detailed x-ray picture of the heart.  The test makes a quantification as to the amount of calcified cholesterol plaque in the coronary blood vessels, these are the blood vessels that supply blood to the heart muscle itself.  If there is no cholesterol plaque detectable on this test it generally indicates that despite having higher cholesterol numbers your risk is still low and we could then forego consideration of medication.  However if the amount of cholesterol plaque was high we would definitely want to make sure we put you on medication.  If you wanted to pursue this line of testing we could.      Please let me know your thoughts on the  situation and we can make a further plan.     Resulted Orders   Lipid panel reflex to direct LDL Fasting   Result Value Ref Range    Cholesterol 202 (H) <200 mg/dL    Triglycerides 132 <150 mg/dL    Direct Measure HDL 41 (L) >=50 mg/dL    LDL Cholesterol Calculated 135 (H) <=100 mg/dL    Non HDL Cholesterol 161 (H) <130 mg/dL    Patient Fasting > 8hrs? Yes     Narrative    Cholesterol  Desirable:  <200 mg/dL    Triglycerides  Normal:  Less than 150 mg/dL  Borderline High:  150-199 mg/dL  High:  200-499 mg/dL  Very High:  Greater than or equal to 500 mg/dL    Direct Measure HDL  Female:  Greater than or equal to 50 mg/dL   Male:  Greater than or equal to 40 mg/dL    LDL Cholesterol  Desirable:  <100mg/dL  Above Desirable:  100-129 mg/dL   Borderline High:  130-159 mg/dL   High:  160-189 mg/dL   Very High:  >= 190 mg/dL    Non HDL Cholesterol  Desirable:  130 mg/dL  Above Desirable:  130-159 mg/dL  Borderline High:  160-189 mg/dL  High:  190-219 mg/dL  Very High:  Greater than or equal to 220 mg/dL   Comprehensive metabolic panel (BMP + Alb, Alk Phos, ALT, AST, Total. Bili, TP)   Result Value Ref Range    Sodium 140 135 - 145 mmol/L    Potassium 4.5 3.4 - 5.3 mmol/L    Carbon Dioxide (CO2) 26 22 - 29 mmol/L    Anion Gap 9 7 - 15 mmol/L    Urea Nitrogen 12.3 8.0 - 23.0 mg/dL    Creatinine 0.76 0.51 - 0.95 mg/dL    GFR Estimate 83 >60 mL/min/1.73m2      Comment:      eGFR calculated using 2021 CKD-EPI equation.    Calcium 10.0 8.8 - 10.4 mg/dL      Comment:      Reference intervals for this test were updated on 7/16/2024 to reflect our healthy population more accurately. There may be differences in the flagging of prior results with similar values performed with this method. Those prior results can be interpreted in the context of the updated reference intervals.    Chloride 105 98 - 107 mmol/L    Glucose 116 (H) 70 - 99 mg/dL    Alkaline Phosphatase 89 40 - 150 U/L    AST 25 0 - 45 U/L    ALT 22 0 - 50 U/L    Protein  Total 7.2 6.4 - 8.3 g/dL    Albumin 4.4 3.5 - 5.2 g/dL    Bilirubin Total 1.0 <=1.2 mg/dL    Patient Fasting > 8hrs? Yes    Hemoglobin A1c   Result Value Ref Range    Hemoglobin A1C 5.9 (H) 0.0 - 5.6 %      Comment:      Normal <5.7%   Prediabetes 5.7-6.4%    Diabetes 6.5% or higher     Note: Adopted from ADA consensus guidelines.       If you have any questions or concerns, please call the clinic at the number listed above.       Sincerely,      Catarino Nicholson MD

## 2024-09-03 ENCOUNTER — THERAPY VISIT (OUTPATIENT)
Dept: PHYSICAL THERAPY | Facility: REHABILITATION | Age: 69
End: 2024-09-03
Payer: COMMERCIAL

## 2024-09-03 DIAGNOSIS — M25.562 ACUTE PAIN OF LEFT KNEE: ICD-10-CM

## 2024-09-03 DIAGNOSIS — M54.42 CHRONIC LEFT-SIDED LOW BACK PAIN WITH LEFT-SIDED SCIATICA: Primary | ICD-10-CM

## 2024-09-03 DIAGNOSIS — G89.29 CHRONIC LEFT-SIDED LOW BACK PAIN WITH LEFT-SIDED SCIATICA: Primary | ICD-10-CM

## 2024-09-03 LAB — HBA1C MFR BLD: 5.9 % (ref 0–5.6)

## 2024-09-03 PROCEDURE — 97110 THERAPEUTIC EXERCISES: CPT | Mod: GP

## 2024-09-10 ENCOUNTER — TRANSFERRED RECORDS (OUTPATIENT)
Dept: HEALTH INFORMATION MANAGEMENT | Facility: CLINIC | Age: 69
End: 2024-09-10
Payer: COMMERCIAL

## 2024-09-11 ENCOUNTER — THERAPY VISIT (OUTPATIENT)
Dept: PHYSICAL THERAPY | Facility: REHABILITATION | Age: 69
End: 2024-09-11
Payer: COMMERCIAL

## 2024-09-11 DIAGNOSIS — G89.29 CHRONIC LEFT-SIDED LOW BACK PAIN WITH LEFT-SIDED SCIATICA: Primary | ICD-10-CM

## 2024-09-11 DIAGNOSIS — M25.562 ACUTE PAIN OF LEFT KNEE: ICD-10-CM

## 2024-09-11 DIAGNOSIS — M54.42 CHRONIC LEFT-SIDED LOW BACK PAIN WITH LEFT-SIDED SCIATICA: Primary | ICD-10-CM

## 2024-09-11 PROCEDURE — 97110 THERAPEUTIC EXERCISES: CPT | Mod: GP

## 2024-09-13 NOTE — PROCEDURES
Overnight oximetry report    Study date: April 16, 2024    Overnight oximetry with the CPAP device was recommended at patient's follow-up sleep clinic visit note on April 2, 2024 to check for resolution of the sleep associated hypoxemia that was observed during the diagnostic polysomnography obtained on 12/12/23.  The report was unavailable for a long time and was only recently scanned in epic last month.     Overnight oximetry was done on April 16, 2024 with auto CPAP settings at 5-15 cms of water. (valid recording time: 9 hours). The oximetry was abnormal with sleep associated hypoxemia. Baseline oxygen saturation was 91 % and the lowest oxygen saturation was 74 %. Time spent with oxygen saturations at or below 88% was 50.3 minutes.   Parallel  DOWNLOADABLE COMPLIANCE DATA:   She was compliant with the CPAP treatment with a residual AHI of 1.6 apneas per hour    Assessment:  Based on the compliance data per synopsis, patient was compliant with CPAP therapy and obstructive sleep apnea was optimally controlled with a residual AHI of 1.6/h.  The overnight oximetry shows hypoxemia that is likely artifactual.    Recommendations:  Continue using the CPAP regularly during sleep at the current pressure settings and getting the supplies for the device replaced regularly.  Recommend obtaining a WATCH PAT Home sleep study with the CPAP to re-evaluate the hypoxemia.  Plan to communicate the test results with the patient via [employer].    Beto Hernandez MD    [facility]  [address] [place] [address]      [facility] [place]  [address], [address], [place] [address]

## 2024-09-16 DIAGNOSIS — I10 ESSENTIAL HYPERTENSION: ICD-10-CM

## 2024-09-16 RX ORDER — ATENOLOL 50 MG/1
50 TABLET ORAL DAILY
Qty: 90 TABLET | Refills: 2 | Status: SHIPPED | OUTPATIENT
Start: 2024-09-16

## 2024-11-20 ENCOUNTER — TRANSFERRED RECORDS (OUTPATIENT)
Dept: HEALTH INFORMATION MANAGEMENT | Facility: CLINIC | Age: 69
End: 2024-11-20
Payer: COMMERCIAL

## 2024-12-23 ASSESSMENT — SLEEP AND FATIGUE QUESTIONNAIRES
HOW LIKELY ARE YOU TO NOD OFF OR FALL ASLEEP WHILE SITTING AND READING: SLIGHT CHANCE OF DOZING
HOW LIKELY ARE YOU TO NOD OFF OR FALL ASLEEP WHILE LYING DOWN TO REST IN THE AFTERNOON WHEN CIRCUMSTANCES PERMIT: MODERATE CHANCE OF DOZING
HOW LIKELY ARE YOU TO NOD OFF OR FALL ASLEEP WHILE SITTING INACTIVE IN A PUBLIC PLACE: WOULD NEVER DOZE
HOW LIKELY ARE YOU TO NOD OFF OR FALL ASLEEP WHILE WATCHING TV: SLIGHT CHANCE OF DOZING
HOW LIKELY ARE YOU TO NOD OFF OR FALL ASLEEP WHILE SITTING AND TALKING TO SOMEONE: WOULD NEVER DOZE
HOW LIKELY ARE YOU TO NOD OFF OR FALL ASLEEP IN A CAR, WHILE STOPPED FOR A FEW MINUTES IN TRAFFIC: WOULD NEVER DOZE
HOW LIKELY ARE YOU TO NOD OFF OR FALL ASLEEP WHILE SITTING QUIETLY AFTER LUNCH WITHOUT ALCOHOL: SLIGHT CHANCE OF DOZING
HOW LIKELY ARE YOU TO NOD OFF OR FALL ASLEEP WHEN YOU ARE A PASSENGER IN A CAR FOR AN HOUR WITHOUT A BREAK: SLIGHT CHANCE OF DOZING

## 2024-12-26 NOTE — PROGRESS NOTES
Device has been registered and shipped via [employer] on 12/24/24. Patient was notified that package was mailed out.    Halie Guerrier , Home Sleep Studies Specialist  [place]  / [facility]

## 2024-12-27 ENCOUNTER — VIRTUAL VISIT (OUTPATIENT)
Dept: SLEEP MEDICINE | Facility: CLINIC | Age: 69
End: 2024-12-27
Attending: INTERNAL MEDICINE
Payer: COMMERCIAL

## 2024-12-27 DIAGNOSIS — G47.36 HYPOXEMIA ASSOCIATED WITH SLEEP: ICD-10-CM

## 2024-12-30 ENCOUNTER — DOCUMENTATION ONLY (OUTPATIENT)
Dept: SLEEP MEDICINE | Facility: CLINIC | Age: 69
End: 2024-12-30
Payer: COMMERCIAL

## 2025-01-10 ENCOUNTER — TRANSFERRED RECORDS (OUTPATIENT)
Dept: HEALTH INFORMATION MANAGEMENT | Facility: CLINIC | Age: 70
End: 2025-01-10

## 2025-01-22 NOTE — PROCEDURES
"WatchPAT - HOME SLEEP STUDY INTERPRETATION    Patient: Clarisse Romero  MRN: [id number]  YOB: 1955  Study Date: 12/30/2024  Referring Provider: Catarino Nicholson MD  Ordering Provider: Beto Hernandez MD    Chain of custody patient verification was not enabled.  Chain of custody verification was not present throughout the entire study.     Indications for Home Study: Clarisse Romero is a 69 year old female with previously diagnosed obstructive sleep apnea managed with auto titrating CPAP therapy with pressure settings ranging 5 to 15 cm water. Home sleep study is obtained with the auto CPAP to check for resolution of hypoxemia that was noted during the polysomnography obtained on December 12, 2023.      Estimated body mass index is 38.27 kg/m  as calculated from the following:    Height as of 8/30/24: 1.727 m (5' 8\").    Weight as of 8/30/24: 114.2 kg (251 lb 11.2 oz).  Total score - Morse: 6 (12/23/2024  9:43 AM)      Data: A full night home sleep study was performed recording the standard physiologic parameters including peripheral arterial tonometry (PAT), sound/snoring, body position,  movement, sound, and oxygen saturation by pulse oximetry. Pulse rate was estimated by oximetry recording. Sleep staging (wake, REM, light, and deep sleep) was derived from PAT signal.  This study was considered adequate based on > 4 hours of quality oximetry and respiratory recording. As specified by the AASM Manual for the Scoring of Sleep and Associated events, version 2.3, Rule VIII.D 1B, 4% oxygen desaturation scoring for hypopneas is used as a standard of care on all home sleep apnea testing.    Total Recording Time: 9 hrs, 7 min  Total Sleep Time: 8 hrs, 3 min  % of Sleep Time REM: 9.8%    Respiratory:  Snoring: Snoring was present.  Respiratory events: The PAT respiratory disturbance index [pRDI] was 6.5 events per hour.  The PAT apnea/hypopnea index [pAHI] was 4.4 events per hour.  JOY " was 4.3 events per hour.  During REM sleep the pAHI was 3.8 events per hour.  Sleep Associated Hypoxemia: sustained hypoxemia was not present. Mean oxygen saturation was 92%.  Minimum was 87%.  Time with saturation at or less than 88% was 1.0 minutes.    Heart Rate:By pulse oximetry, the average pulse rate was 66 bpm. The maximum pulse rate was 97 bpm and the minimum pulse rate was 54 bpm.    Position: Percent of time spent: supine -31.8%, prone -6.0%, on right -34.0%, on left -28.2%.  pAHI was 9.1 per hour supine, 0 per hour prone, 2.6 per hour on right side, and 2.2 per hour on left side.     Assessment:   The obstructive sleep apnea is optimally controlled and the sleep associated hypoxemia resolved with the auto titrating CPAP device at the current pressure settings 5 to 15 cm water.    Recommendations:  Recommend continuing the use of auto titrating CPAP at the current pressure settings regularly during sleep and getting the supplies for the device replaced regularly.  Suggest optimizing sleep hygiene and avoiding sleep deprivation.  Weight management.    Diagnosis Code(s): Obstructive Sleep Apnea G47.33    Beto Hernandez MD, January 21, 2025   Diplomate, American Board of Internal Medicine, Sleep Medicine

## 2025-03-12 ASSESSMENT — SLEEP AND FATIGUE QUESTIONNAIRES

## 2025-03-17 ENCOUNTER — TELEPHONE (OUTPATIENT)
Dept: SLEEP MEDICINE | Facility: CLINIC | Age: 70
End: 2025-03-17

## 2025-03-17 ENCOUNTER — VIRTUAL VISIT (OUTPATIENT)
Dept: SLEEP MEDICINE | Facility: CLINIC | Age: 70
End: 2025-03-17
Payer: COMMERCIAL

## 2025-03-17 VITALS — WEIGHT: 250 LBS | HEIGHT: 68 IN | BODY MASS INDEX: 37.89 KG/M2

## 2025-03-17 DIAGNOSIS — G47.30 OBESITY WITH SLEEP APNEA: Primary | ICD-10-CM

## 2025-03-17 DIAGNOSIS — E66.9 OBESITY WITH SLEEP APNEA: Primary | ICD-10-CM

## 2025-03-17 DIAGNOSIS — G47.33 OSA ON CPAP: ICD-10-CM

## 2025-03-17 PROCEDURE — 1126F AMNT PAIN NOTED NONE PRSNT: CPT | Mod: 95 | Performed by: INTERNAL MEDICINE

## 2025-03-17 PROCEDURE — 98006 SYNCH AUDIO-VIDEO EST MOD 30: CPT | Performed by: INTERNAL MEDICINE

## 2025-03-17 ASSESSMENT — PAIN SCALES - GENERAL: PAINLEVEL_OUTOF10: NO PAIN (0)

## 2025-03-17 NOTE — PROGRESS NOTES
Video visit start time: 4:09 PM  Video visit end time: 4:28 PM      [place]  Sleep clinic follow-up visit note              Date: March 17, 2025     Chief complaint:  Follow-up of AMINATA, review WatchPAT HST results and review CPAP compliance measures     Clarisse Romeor is a 70 year old female who presents to sleep clinic for follow-up of previously diagnosed obstructive sleep apnea managed with CPAP therapy.      Previous sleep study (PSG) obtained on December 12, 2023 showed severe obstructive sleep apnea (AHI equal to 38.8/hour) and  sleep associated hypoxemia with lowest O2 saturation at 82% and time spent with O2 saturation less than or equal to 88% of equal to 58 minutes.    She was set up at [facility] on December 18, 2023. Patient received a Resmed Airsense 10 Pressures were set at  5-15 cm H2O.     WatchPAT home sleep study was obtained on December 30, 2024 with the auto CPAP pressure settings 5 to 15 cm water to check for resolution of hypoxemia that is observed during PSG from December 2023.  Study date: December 30, 2024  Total Recording Time: 9 hrs, 7 min  Total Sleep Time: 8 hrs, 3 min  % of Sleep Time REM: 9.8%     Respiratory:  Snoring: Snoring was present.  Respiratory events: The PAT respiratory disturbance index [pRDI] was 6.5 events per hour.  The PAT apnea/hypopnea index [pAHI] was 4.4 events per hour.  JOY was 4.3 events per hour.  During REM sleep the pAHI was 3.8 events per hour.  Sleep Associated Hypoxemia: sustained hypoxemia was not present. Mean oxygen saturation was 92%.  Minimum was 87%.  Time with saturation at or less than 88% was 1.0 minutes.     Heart Rate:By pulse oximetry, the average pulse rate was 66 bpm. The maximum pulse rate was 97 bpm and the minimum pulse rate was 54 bpm.     Position: Percent of time spent: supine -31.8%, prone -6.0%, on right -34.0%, on left -28.2%.  pAHI was 9.1 per hour supine, 0 per hour prone, 2.6 per hour on right side, and 2.2 per hour on  left side.      Assessment:   The obstructive sleep apnea is optimally controlled and the sleep associated hypoxemia resolved with the auto titrating CPAP device at the current pressure settings 5 to 15 cm water.    Results of the WatchPAT HST were previously communicated with patient via [id number] and also discussed during today's visit.    Pressure settings on CPAP ranged between 5 to 15 cmH2O  Patient reports using the CPAP regularly during sleep. She uses full face mask.   There are no reports of snoring, apneas or awakenings due to gasping  with the device use.   Pressure settings feel comfortable but she reports air hunger when she applies the mask initially.    Patient reports good sleep quality with the device use.   Bedtime time: 10 to 10:30 PM   wake time: 7 to 8 AM  Patient denies nonrestorative sleep, fatigue or excessive daytime sleepiness.   There are no reports of  drowsiness while driving.         DOWNLOADABLE COMPLIANCE DATA: (ResMed)  From February 9, 2025 through  March 10, 2025 reveals that patient to use the device for 30 out of 30 days with an averag use of 8 hours and 27 minutes on the days used. 95th percentile on leak was 6.6 L/min; Residual AHI was 0.8 per hour. Median pressure settings: 9.4; 95th percentile : 11.8 and max pressure: 13.1 cm water      Past medical/surgical history, family history, social history, medications and allergies were reviewed.      Problem list:  Patient Active Problem List   Diagnosis    Major depression in partial remission    Migraine Headache    Joint Pain, Localized In The Knee    Hypertension    Sinusitis    Benign neoplasm of descending colon    Diverticular disease of large intestine    Allergic rhinitis    Urinary incontinence    Class 2 severe obesity due to excess calories with serious comorbidity in adult (H)    Chronic pain of both knees    AMINATA (obstructive sleep apnea)               Physical Examination:   General: Pleasant. Cooperative. In no apparent  distress.  Pulmonary: Able to speak in full sentences easily. No cough or wheeze.   Neurologic: Alert, oriented x3.  Psychiatric: Mood euthymic. Affect congruent with full range and intensity.     ASSESSMENT/PLAN:  Obstructive sleep apnea: Pt reports adequate compliance with CPAP and reports positive benefits with CPAP treatment. Based on compliance measures, AMINATA is adequately controlled with CPAP at the current settings.    Sleep associated hypoxemia resolved with CPAP treatment ( Based on recent WatchPAT HST results with the CPAP at the current pressure settings, the hypoxemia resolved)  DME orders were generated for revision of the pressure settings on the auto CPAP to range 8 to 15 cm water  based on the reports of air hunger and compliance download and for renewal of CPAP supplies.  Patient was instructed to communicate via [employer] if she has any difficulty tolerating the revised pressure settings.  Recommended to continue using the CPAP regularly during sleep and getting the supplies for the CPAP replaced regularly.   Patient instructed to remember to bring PAP with her if hospitalized and if anticipating procedure that requires sedation/surgery to be sure to discuss with the provider/surgeon that she has sleep apnea and uses PAP therapy.    Obesity: We discussed weight management with healthy diet, and exercise.  We discussed about referral to medical weight management clinic, patient was interested and referral to medical weight management clinic was provided.     Patient was strongly advised to avoid driving, operating any heavy machinery or other hazardous situations while drowsy or sleepy.  Patient was counseled on the importance of driving while alert, to pull over if drowsy, or nap before getting into the vehicle if sleepy.      Follow-up with sleep clinic via video visit in 1 year  or sooner if any concerns.     The above note was dictated using voice recognition software. Although reviewed after  "completion, some word and grammatical error may remain . Please contact the author for any clarifications.      \" Total time spent was 30 minutes  for this appointment on this date of service which include time spent before, during and after the visit for chart review, patient care, counseling and coordination of care. Including documentation\"      Beto Hernandez MD  [facility]  [address] , [place] [address]   "

## 2025-03-17 NOTE — NURSING NOTE
Current patient location: [address] [place] [id number]    Is the patient currently in the state of [place]? YES    Visit mode: VIDEO    If the visit is dropped, the patient can be reconnected by:VIDEO VISIT: Text to cell phone:   Telephone Information:   Mobile [phone]       Will anyone else be joining the visit? NO  (If patient encounters technical issues they should call [phone])    Are changes needed to the allergy or medication list? No    Are refills needed on medications prescribed by this physician? NO    Rooming Documentation:  Questionnaire(s) completed    Reason for visit: RECHECK    Bobbilynn Grossaint VVF

## 2025-03-17 NOTE — TELEPHONE ENCOUNTER
Patient needs to be rescheduled for their virtual visit due to Reason for Reschedule: No-Show    Scheduling team, please refer to service line late cancellation/no-show policies and reach out to patient at a later date for rescheduling.    Appointment mode: Video  Provider: john

## 2025-04-21 ENCOUNTER — VIRTUAL VISIT (OUTPATIENT)
Dept: FAMILY MEDICINE | Facility: OTHER | Age: 70
End: 2025-04-21
Payer: COMMERCIAL

## 2025-04-21 DIAGNOSIS — J22 LRTI (LOWER RESPIRATORY TRACT INFECTION): Primary | ICD-10-CM

## 2025-04-21 PROCEDURE — 98006 SYNCH AUDIO-VIDEO EST MOD 30: CPT | Performed by: PHYSICIAN ASSISTANT

## 2025-04-21 RX ORDER — BENZONATATE 200 MG/1
200 CAPSULE ORAL 3 TIMES DAILY PRN
Qty: 30 CAPSULE | Refills: 0 | Status: SHIPPED | OUTPATIENT
Start: 2025-04-21

## 2025-04-21 RX ORDER — AZITHROMYCIN 250 MG/1
TABLET, FILM COATED ORAL
Qty: 6 TABLET | Refills: 0 | Status: SHIPPED | OUTPATIENT
Start: 2025-04-21

## 2025-04-21 RX ORDER — ALBUTEROL SULFATE 90 UG/1
2 INHALANT RESPIRATORY (INHALATION) EVERY 4 HOURS PRN
Qty: 18 G | Refills: 0 | Status: SHIPPED | OUTPATIENT
Start: 2025-04-21

## 2025-04-21 NOTE — PROGRESS NOTES
Clarisse is a 70 year old who is being evaluated via a billable video visit.    How would you like to obtain your AVS? MyChart  If the video visit is dropped, the invitation should be resent by: Text to cell phone: [phone]  Will anyone else be joining your video visit? No      Assessment & Plan     LRTI (lower respiratory tract infection)  Concern for possible pneumonia. This could still be viral but her symptoms are worsening instead of getting better and  had same symptoms this last week and was diagnosed and treated for pneumonia. Starting her on Azithromycin as this may help with inflammation in airways as well, will also give her a new albuterol inhaler as she notes this has helped in the past and can use every 4-6 hours. Will send Benzonatate to use as needed for now for the cough as well. She can use the Mucinex product since her Hypertension in general is well controlled and she is still taking her medication for her blood pressure.   - albuterol (PROAIR HFA/PROVENTIL HFA/VENTOLIN HFA) 108 (90 Base) MCG/ACT inhaler; Inhale 2 puffs into the lungs every 4 hours as needed for shortness of breath, wheezing or cough.  - azithromycin (ZITHROMAX) 250 MG tablet; Two tablets first day, then one tablet daily for four days.  - benzonatate (TESSALON) 200 MG capsule; Take 1 capsule (200 mg) by mouth 3 times daily as needed for cough.      Recommended close follow-up if not improving over the next 3-5 days, sooner if worse or new concerns.     Options for treatment and follow-up care were reviewed with the patient and/or guardian. Patient and/or guardian engaged in the decision making process and verbalized understanding of the options discussed and agreed with the final plan.     Subjective   Clarisse is a 70 year old, presenting for the following health issues:  No chief complaint on file.      Video Start Time: 8:37 AM    History of Present Illness       Reason for visit:  Non productive coughing   She is taking  medications regularly.        Acute Illness  Acute illness concerns: Cough -  got this a week ago and he went in and now was diagnosed with pneumonia.   Onset/Duration: Last week Thursday, 5 days   Symptoms:  Fever: No  Chills/Sweats: No  Headache (location?): YES- When she lays down she'll start coughing.  Tylenol does help.   Sinus Pressure: YES- now starting to get runny nose.   Conjunctivitis:  No  Ear Pain: no  Rhinorrhea: YES  Congestion: YES  Sore Throat: YES- Maybe slightly on the first day but thinks it might be drainage, it is now one.   Cough: YES - It has been dry.   Wheeze: Not wheezing really but  notes maybe a little bit.   Decreased Appetite: YES  Nausea: No  Vomiting: No  Diarrhea: No  Sick/Strep Exposure: YES -  had Pneumonia  Therapies tried and outcome: Chloracedin, Mucinex DM    - Used to get albuterol in the past and that has helped with opening up her lungs. But her current Rx was when she had COVID      Review of Systems  Constitutional, HEENT, cardiovascular, pulmonary, gi and gu systems are negative, except as otherwise noted.      Objective           Vitals:  No vitals were obtained today due to virtual visit.    Physical Exam   GENERAL: alert and no distress  EYES: Eyes grossly normal to inspection.  No discharge or erythema, or obvious scleral/conjunctival abnormalities.  RESP: Intermittent dry cough, sounds congested.  No audible wheeze, or visible cyanosis.    SKIN: Visible skin clear. No significant rash, abnormal pigmentation or lesions.  NEURO: Cranial nerves grossly intact.  Mentation and speech appropriate for age.  PSYCH: Appropriate affect, tone, and pace of words        Video-Visit Details    Type of service:  Video Visit   Video End Time:8:44 AM  Originating Location (pt. Location): Home    Distant Location (provider location):  Off-site  Platform used for Video Visit: Cuco  Signed Electronically by: Mario Alberto Hudson PA-C

## 2025-04-30 ENCOUNTER — TRANSCRIBE ORDERS (OUTPATIENT)
Dept: OTHER | Age: 70
End: 2025-04-30

## 2025-04-30 DIAGNOSIS — M17.12 OSTEOARTHRITIS OF LEFT KNEE: Primary | ICD-10-CM

## 2025-05-05 ENCOUNTER — OFFICE VISIT (OUTPATIENT)
Dept: FAMILY MEDICINE | Facility: CLINIC | Age: 70
End: 2025-05-05
Payer: COMMERCIAL

## 2025-05-05 VITALS
DIASTOLIC BLOOD PRESSURE: 76 MMHG | OXYGEN SATURATION: 95 % | TEMPERATURE: 98.2 F | WEIGHT: 262 LBS | BODY MASS INDEX: 39.71 KG/M2 | SYSTOLIC BLOOD PRESSURE: 128 MMHG | RESPIRATION RATE: 16 BRPM | HEART RATE: 66 BPM | HEIGHT: 68 IN

## 2025-05-05 DIAGNOSIS — E66.812 CLASS 2 SEVERE OBESITY DUE TO EXCESS CALORIES WITH SERIOUS COMORBIDITY AND BODY MASS INDEX (BMI) OF 39.0 TO 39.9 IN ADULT (H): ICD-10-CM

## 2025-05-05 DIAGNOSIS — M25.561 CHRONIC PAIN OF BOTH KNEES: ICD-10-CM

## 2025-05-05 DIAGNOSIS — I10 ESSENTIAL HYPERTENSION: ICD-10-CM

## 2025-05-05 DIAGNOSIS — Z01.818 PREOP GENERAL PHYSICAL EXAM: Primary | ICD-10-CM

## 2025-05-05 DIAGNOSIS — M25.562 CHRONIC PAIN OF BOTH KNEES: ICD-10-CM

## 2025-05-05 DIAGNOSIS — G89.29 CHRONIC PAIN OF BOTH KNEES: ICD-10-CM

## 2025-05-05 DIAGNOSIS — E66.01 CLASS 2 SEVERE OBESITY DUE TO EXCESS CALORIES WITH SERIOUS COMORBIDITY AND BODY MASS INDEX (BMI) OF 39.0 TO 39.9 IN ADULT (H): ICD-10-CM

## 2025-05-05 LAB
ANION GAP SERPL CALCULATED.3IONS-SCNC: 10 MMOL/L (ref 7–15)
ATRIAL RATE - MUSE: 66 BPM
BUN SERPL-MCNC: 12.3 MG/DL (ref 8–23)
CALCIUM SERPL-MCNC: 10.3 MG/DL (ref 8.8–10.4)
CHLORIDE SERPL-SCNC: 101 MMOL/L (ref 98–107)
CREAT SERPL-MCNC: 0.71 MG/DL (ref 0.51–0.95)
DIASTOLIC BLOOD PRESSURE - MUSE: NORMAL MMHG
EGFRCR SERPLBLD CKD-EPI 2021: 90 ML/MIN/1.73M2
GLUCOSE SERPL-MCNC: 119 MG/DL (ref 70–99)
HCO3 SERPL-SCNC: 27 MMOL/L (ref 22–29)
HGB BLD-MCNC: 13.4 G/DL (ref 11.7–15.7)
INTERPRETATION ECG - MUSE: NORMAL
P AXIS - MUSE: 69 DEGREES
POTASSIUM SERPL-SCNC: 4.7 MMOL/L (ref 3.4–5.3)
PR INTERVAL - MUSE: 234 MS
QRS DURATION - MUSE: 106 MS
QT - MUSE: 394 MS
QTC - MUSE: 413 MS
R AXIS - MUSE: 74 DEGREES
SODIUM SERPL-SCNC: 138 MMOL/L (ref 135–145)
SYSTOLIC BLOOD PRESSURE - MUSE: NORMAL MMHG
T AXIS - MUSE: 50 DEGREES
VENTRICULAR RATE- MUSE: 66 BPM

## 2025-05-05 PROCEDURE — 80048 BASIC METABOLIC PNL TOTAL CA: CPT

## 2025-05-05 PROCEDURE — 3078F DIAST BP <80 MM HG: CPT

## 2025-05-05 PROCEDURE — 93010 ELECTROCARDIOGRAM REPORT: CPT | Performed by: STUDENT IN AN ORGANIZED HEALTH CARE EDUCATION/TRAINING PROGRAM

## 2025-05-05 PROCEDURE — 85018 HEMOGLOBIN: CPT

## 2025-05-05 PROCEDURE — 99214 OFFICE O/P EST MOD 30 MIN: CPT

## 2025-05-05 PROCEDURE — 93005 ELECTROCARDIOGRAM TRACING: CPT

## 2025-05-05 PROCEDURE — 1126F AMNT PAIN NOTED NONE PRSNT: CPT

## 2025-05-05 PROCEDURE — 36415 COLL VENOUS BLD VENIPUNCTURE: CPT

## 2025-05-05 PROCEDURE — 3074F SYST BP LT 130 MM HG: CPT

## 2025-05-05 ASSESSMENT — PAIN SCALES - GENERAL: PAINLEVEL_OUTOF10: NO PAIN (0)

## 2025-05-05 NOTE — PROGRESS NOTES
Preoperative Evaluation  [facility]  [address]  [facility] [address]  Phone: [phone]  Fax: [phone]  Primary Provider: Catarino Nicholson MD, MD  Pre-op Performing Provider: OLINDA Dotson CNP  May 5, 2025             4/30/2025   Surgical Information   What procedure is being done? left knee surgery   Facility or Hospital where procedure/surgery will be performed: [facility]   Who is doing the procedure / surgery? Dr. Lizarraga   Date of surgery / procedure: May 14, 2025   Time of surgery / procedure: Unknown   Where do you plan to recover after surgery? at home with family     Fax number for surgical facility:     Assessment & Plan     The proposed surgical procedure is considered INTERMEDIATE risk.    Preop general physical exam  70 year old female with PMH of HTN here for pre-operative exam for left knee replacement. She is in good health today. No concerns today. EKG completed without concerns. No previous issues with anesthesia.   - EKG 12-lead, tracing only  - Basic metabolic panel  (Ca, Cl, CO2, Creat, Gluc, K, Na, BUN); Future  - Hemoglobin; Future    Chronic pain of both knees  Proceed with scheduled knee replacement.     Hypertension  Well controlled. Losartan 100mg, hydrochlorothiazide 25mg, atenolol 50mg.     Class 2 severe obesity due to excess calories with serious comorbidity and body mass index (BMI) of 39.0 to 39.9 in adult (H)  Hopeful to lose weight with improved mobility after surgery.             - No identified additional risk factors other than previously addressed    Preoperative Medication Instructions  Antiplatelet or Anticoagulation Medication Instructions   - We reviewed the medication list and the patient is not on an antiplatelet or anticoagulation medications.    Additional Medication Instructions   - Losartan: DO NOT TAKE on day of surgery (minimum 11 hours for general anesthesia).   - hydrochlorothiazide  DO NOT TAKE on the day of surgery.   - Atenolol: Continue taking on the day of surgery.   - Celexa: Continue without modification.     Avoid all supplements, herbs, vitamins 7 days prior to surgery.         Recommendation  Approval given to proceed with proposed procedure, without further diagnostic evaluation.        Oziel Robb is a 70 year old, presenting for the following:  Pre-Op Exam (05.14.25 Knee replacement Dr. [facility])          5/5/2025    10:39 AM   Additional Questions   Roomed by nl     HPI:     Left knee surgery  Has had instability and gait issues for the apst 4 years.   Has jelly doing physical therapy and injections to get her by  Time for replacement  Dr. Lizarraga with [facility]    Just getting over a cold.   No fevers, sore throat, shortness of breath or chest pain    Taking low dose aspirin for 10-14 days per Dr. Lizarraga's (orthopedic surgeon) direction.                 4/30/2025   Pre-Op Questionnaire   Have you ever had a heart attack or stroke? No   Have you ever had surgery on your heart or blood vessels, such as a stent placement, a coronary artery bypass, or surgery on an artery in your head, neck, heart, or legs? No   Do you have chest pain with activity? No   Do you have a history of heart failure? No   Do you currently have a cold, bronchitis or symptoms of other infection? (!) YES, recovering. No fevers, chills, SOB, chest pain   Do you have a cough, shortness of breath, or wheezing? (!) YES, see above   Do you or anyone in your family have previous history of blood clots? (!) UNKNOWN, maternal uncle and maternal grandfather with DVTs    Do you or does anyone in your family have a serious bleeding problem such as prolonged bleeding following surgeries or cuts? No   Have you ever had problems with anemia or been told to take iron pills? No   Have you had any abnormal blood loss such as black, tarry or bloody stools, or abnormal vaginal bleeding? No   Have you ever had a  blood transfusion? No   Are you willing to have a blood transfusion if it is medically needed before, during, or after your surgery? Yes   Have you or any of your relatives ever had problems with anesthesia? No   Do you have sleep apnea, excessive snoring or daytime drowsiness? (!) YES   Do you have a CPAP machine? Yes   Do you have any artifical heart valves or other implanted medical devices like a pacemaker, defibrillator, or continuous glucose monitor? No   Do you have artificial joints? No   Are you allergic to latex? No     Advance Care Planning    Discussed advance care planning with patient; however, patient declined at this time.    Preoperative Review of    reviewed - no record of controlled substances prescribed.      Status of Chronic Conditions:  See problem list for active medical problems.  Problems all longstanding and stable, except as noted/documented.  See ROS for pertinent symptoms related to these conditions.    Patient Active Problem List    Diagnosis Date Noted    AMINATA (obstructive sleep apnea) 12/14/2023     Priority: Medium     12/12/2023 [facility] Study (252.0 lbs) - AHI 38.8, RDI 41.3, Supine AHI 96.9, REM AHI 7.3, Low O2 82.0%, Time Spent <=88% 58.0 minutes / Time Spent <=89% 118.7 minutes.      Chronic pain of both knees 09/06/2023     Priority: Medium    Class 2 severe obesity due to excess calories with serious comorbidity in adult (H) 08/28/2023     Priority: Medium    Urinary incontinence 08/22/2023     Priority: Medium    Benign neoplasm of descending colon 11/14/2019     Priority: Medium    Diverticular disease of large intestine 11/12/2019     Priority: Medium    Major depression in partial remission      Priority: Medium     Created by Conversion        Migraine Headache      Priority: Medium     Created by Conversion  Replacement Utility updated for latest IMO load        Hypertension      Priority: Medium     Created by Conversion  Replacement Utility  updated for latest IMO load        Sinusitis      Priority: Medium     Created by Conversion  Replacement Utility updated for latest IMO load        Allergic rhinitis 01/26/2016     Priority: Medium    Joint Pain, Localized In The Knee      Priority: Medium     Created by Conversion          Past Medical History:   Diagnosis Date    Depressive disorder     Hypertension      Past Surgical History:   Procedure Laterality Date    RELEASE CARPAL TUNNEL  06/01/2016    RELEASE TRIGGER FINGER  06/01/2016     Current Outpatient Medications   Medication Sig Dispense Refill    albuterol (PROAIR HFA/PROVENTIL HFA/VENTOLIN HFA) 108 (90 Base) MCG/ACT inhaler Inhale 2 puffs into the lungs every 4 hours as needed for shortness of breath, wheezing or cough. 18 g 0    atenolol (TENORMIN) 50 MG tablet TAKE ONE TABLET BY MOUTH ONE TIME DAILY 90 tablet 2    azithromycin (ZITHROMAX) 250 MG tablet Two tablets first day, then one tablet daily for four days. 6 tablet 0    benzonatate (TESSALON) 200 MG capsule Take 1 capsule (200 mg) by mouth 3 times daily as needed for cough. 30 capsule 0    citalopram (CELEXA) 20 MG tablet Take 1 tablet (20 mg) by mouth daily. 90 tablet 3    fluticasone (FLONASE) 50 MCG/ACT nasal spray Inhale 2 sprays in each nostril once daily. 16 g 3    hydrochlorothiazide (HYDRODIURIL) 25 MG tablet Take 1 tablet (25 mg) by mouth daily. 90 tablet 3    losartan (COZAAR) 100 MG tablet Take 1 tablet (100 mg) by mouth daily 90 tablet 3       No Known Allergies     Social History     Tobacco Use    Smoking status: Never     Passive exposure: Never    Smokeless tobacco: Never   Substance Use Topics    Alcohol use: Yes     Comment: Very rarely       History   Drug Use Unknown             Review of Systems  Constitutional, neuro, ENT, endocrine, pulmonary, cardiac, gastrointestinal, genitourinary, musculoskeletal, integument and psychiatric systems are negative, except as otherwise noted.    Objective    There were no vitals  "taken for this visit.   Estimated body mass index is 38.01 kg/m  as calculated from the following:    Height as of 3/17/25: 1.727 m (5' 8\").    Weight as of 3/17/25: 113.4 kg (250 lb).  Physical Exam  GENERAL: alert and no distress  EYES: Eyes grossly normal to inspection, PERRL and conjunctivae and sclerae normal  HENT: ear canals and TM's normal,  and mouth without ulcers or lesions  NECK: no adenopathy, no asymmetry, masses, or scars  RESP: lungs clear to auscultation - no rales, rhonchi or wheezes  CV: regular rate and rhythm, normal S1 S2, no S3 or S4, no murmur, click or rub, no peripheral edema  ABDOMEN: soft, nontender, no hepatosplenomegaly, no masses and bowel sounds normal  MS: no gross musculoskeletal defects noted, no edema  SKIN: no suspicious lesions or rashes  NEURO: Normal strength and tone, mentation intact and speech normal  PSYCH: mentation appears normal, affect normal/bright      Diagnostics  Recent Results (from the past 24 hours)   EKG 12-lead, tracing only    Collection Time: 05/05/25 10:49 AM   Result Value Ref Range    Systolic Blood Pressure  mmHg    Diastolic Blood Pressure  mmHg    Ventricular Rate 66 BPM    Atrial Rate 66 BPM    WV Interval 234 ms    QRS Duration 106 ms     ms    QTc 413 ms    P Axis 69 degrees    R AXIS 74 degrees    T Axis 50 degrees    Interpretation ECG       Sinus rhythm with 1st degree A-V block  Otherwise normal ECG  When compared with ECG of 31-Dec-2008 05:53,  WV interval has increased  T wave inversion no longer evident in Inferior leads  Confirmed by DYLAN ROSS MD LOC:JN ([id number]) on 5/5/2025 12:51:07 PM     Basic metabolic panel  (Ca, Cl, CO2, Creat, Gluc, K, Na, BUN)    Collection Time: 05/05/25 11:24 AM   Result Value Ref Range    Sodium 138 135 - 145 mmol/L    Potassium 4.7 3.4 - 5.3 mmol/L    Chloride 101 98 - 107 mmol/L    Carbon Dioxide (CO2) 27 22 - 29 mmol/L    Anion Gap 10 7 - 15 mmol/L    Urea Nitrogen 12.3 8.0 - 23.0 mg/dL    Creatinine " 0.71 0.51 - 0.95 mg/dL    GFR Estimate 90 >60 mL/min/1.73m2    Calcium 10.3 8.8 - 10.4 mg/dL    Glucose 119 (H) 70 - 99 mg/dL   Hemoglobin    Collection Time: 05/05/25 11:24 AM   Result Value Ref Range    Hemoglobin 13.4 11.7 - 15.7 g/dL      EKG: appears normal, NSR, normal axis, normal intervals, no acute ST/T changes c/w ischemia, no LVH by voltage criteria, unchanged from previous tracings    Revised Cardiac Risk Index (RCRI)  The patient has the following serious cardiovascular risks for perioperative complications:   - No serious cardiac risks = 0 points     RCRI Interpretation: 0 points: Class I (very low risk - 0.4% complication rate)         Signed Electronically by: OLINDA Dotson CNP  A copy of this evaluation report is provided to the requesting physician.

## 2025-05-05 NOTE — PATIENT INSTRUCTIONS
Can take Colace (dulcolax) for stool softener    How to Take Your Medication Before Surgery  Preoperative Medication Instructions   Antiplatelet or Anticoagulation Medication Instructions   - We reviewed the medication list and the patient is not on an antiplatelet or anticoagulation medications.    Additional Medication Instructions   - Losartan: DO NOT TAKE on day of surgery (minimum 11 hours for general anesthesia).   - hydrochlorothiazide DO NOT TAKE on the day of surgery.   - Atenolol: Continue taking on the day of surgery.   - Celexa: Continue without modification.     Avoid all supplements, herbs, vitamins 7 days prior to surgery.              Patient Education   Preparing for Your Surgery  For Adults  Getting started  In most cases, a nurse will call to review your health history and instructions. They will give you an arrival time based on your scheduled surgery time. Please be ready to share:  Your doctor's clinic name and phone number  Your medical, surgical, and anesthesia history  A list of allergies and sensitivities  A list of medicines, including herbal treatments and over-the-counter drugs  Whether the patient has a legal guardian (ask how to send us the papers in advance)  Note: You may not receive a call if you were seen at our PAC (Preoperative Assessment Center).  Please tell us if you're pregnant--or if there's any chance you might be pregnant. Some surgeries may injure a fetus (unborn baby), so they require a pregnancy test. Surgeries that are safe for a fetus don't always need a test, and you can choose whether to have one.   Preparing for surgery  Within 10 to 30 days of surgery: Have a pre-op exam (sometimes called an H&P, or History and Physical). This can be done at a clinic or pre-operative center.  If you're having a , you may not need this exam. Talk to your care team.  At your pre-op exam, talk to your care team about all medicines you take. (This includes CBD oil and any  drugs, such as THC, marijuana, and other forms of cannabis.) If you need to stop any medicine before surgery, ask when to start taking it again.  This is for your safety. Many medicines and drugs can make you bleed too much during surgery. Some change how well surgery (anesthesia) drugs work.  Call your insurance company to let them know you're having surgery. (If you don't have insurance, call [phone].)  Call your clinic if there's any change in your health. This includes a scrape or scratch near the surgery site, or any signs of a cold (sore throat, runny nose, cough, rash, fever).  Eating and drinking guidelines  For your safety: Unless your surgeon tells you otherwise, follow the guidelines below.  Eat and drink as normal until 8 hours before you arrive for surgery. After that, no food or milk. You can spit out gum when you arrive.  Drink clear liquids until 2 hours before you arrive. These are liquids you can see through, like water, Gatorade, and Propel Water. They also include plain black coffee and tea (no cream or milk).  No alcohol for 24 hours before you arrive. The night before surgery, stop any drinks that contain THC.  If your care team tells you to take medicine on the morning of surgery, it's okay to take it with a sip of water. No other medicines or drugs are allowed (including CBD oil)--follow your care team's instructions.  If you have questions the day of surgery, call your hospital or surgery center.   Preventing infection  Shower or bathe the night before and the morning of surgery. Follow the instructions your clinic gave you. (If no instructions, use regular soap.)  Don't shave or clip hair near your surgery site. We'll remove the hair if needed.  Don't smoke or vape the morning of surgery. No chewing tobacco for 6 hours before you arrive. A nicotine patch is okay. You may spit out nicotine gum when you arrive.  For some surgeries, the surgeon will tell you to fully quit smoking and  nicotine.  We will make every effort to keep you safe from infection. We will:  Clean our hands often with soap and water (or an alcohol-based hand rub).  Clean the skin at your surgery site with a special soap that kills germs.  Give you a special gown to keep you warm. (Cold raises the risk of infection.)  Wear hair covers, masks, gowns, and gloves during surgery.  Give antibiotic medicine, if prescribed. Not all surgeries need this medicine.  What to bring on the day of surgery  Photo ID and insurance card  Copy of your health care directive, if you have one  Glasses and hearing aids (bring cases)  You can't wear contacts during surgery  Inhaler and eye drops, if you use them (tell us about these when you arrive)  CPAP machine or breathing device, if you use them  A few personal items, if spending the night  If you have . . .  A pacemaker, ICD (cardiac defibrillator), or other implant: Bring the ID card.  An implanted stimulator: Bring the remote control.  A legal guardian: Bring a copy of the certified (court-stamped) guardianship papers.  Please remove any jewelry, including body piercings. Leave jewelry and other valuables at home.  If you're going home the day of surgery  You must have a responsible adult drive you home. They should stay with you overnight as well.  If you don't have someone to stay with you, and you aren't safe to go home alone, we may keep you overnight. Insurance often won't pay for this.  After surgery  If it's hard to control your pain or you need more pain medicine, please call your surgeon's office.  Questions?   If you have any questions for your care team, list them here:   ____________________________________________________________________________________________________________________________________________________________________________________________________________________________________________________________  For informational purposes only. Not to replace the advice of  your health care provider. Copyright   2003, 2019 [facility]. All rights reserved. Clinically reviewed by Long Colon MD. Kngroo [id number] - REV 08/24.

## 2025-05-09 ENCOUNTER — LAB REQUISITION (OUTPATIENT)
Dept: LAB | Facility: CLINIC | Age: 70
End: 2025-05-09
Payer: COMMERCIAL

## 2025-05-09 DIAGNOSIS — Z00.00 ENCOUNTER FOR GENERAL ADULT MEDICAL EXAMINATION WITHOUT ABNORMAL FINDINGS: ICD-10-CM

## 2025-05-09 DIAGNOSIS — Z01.818 ENCOUNTER FOR OTHER PREPROCEDURAL EXAMINATION: ICD-10-CM

## 2025-05-09 LAB
ERYTHROCYTE [DISTWIDTH] IN BLOOD BY AUTOMATED COUNT: 14.4 % (ref 10–15)
HCT VFR BLD AUTO: 41.3 % (ref 35–47)
HGB BLD-MCNC: 13.8 G/DL (ref 11.7–15.7)
MCH RBC QN AUTO: 28.2 PG (ref 26.5–33)
MCHC RBC AUTO-ENTMCNC: 33.4 G/DL (ref 31.5–36.5)
MCV RBC AUTO: 84 FL (ref 78–100)
PLATELET # BLD AUTO: 270 10E3/UL (ref 150–450)
RBC # BLD AUTO: 4.9 10E6/UL (ref 3.8–5.2)
WBC # BLD AUTO: 9 10E3/UL (ref 4–11)

## 2025-05-09 PROCEDURE — 36415 COLL VENOUS BLD VENIPUNCTURE: CPT | Mod: ORL | Performed by: NURSE PRACTITIONER

## 2025-05-09 PROCEDURE — 85027 COMPLETE CBC AUTOMATED: CPT | Mod: ORL | Performed by: NURSE PRACTITIONER

## 2025-05-16 ASSESSMENT — ACTIVITIES OF DAILY LIVING (ADL)
RAW_SCORE: 11
STIFFNESS: THE SYMPTOM AFFECTS MY ACTIVITY SEVERELY
GO DOWN STAIRS: I AM UNABLE TO DO THE ACTIVITY
SQUAT: I AM UNABLE TO DO THE ACTIVITY
SQUAT: I AM UNABLE TO DO THE ACTIVITY
KNEE_ACTIVITY_OF_DAILY_LIVING_SCORE: 15.71
PAIN: THE SYMPTOM AFFECTS MY ACTIVITY SEVERELY
SWELLING: THE SYMPTOM AFFECTS MY ACTIVITY MODERATELY
GO UP STAIRS: ACTIVITY IS VERY DIFFICULT
GIVING WAY, BUCKLING OR SHIFTING OF KNEE: THE SYMPTOM AFFECTS MY ACTIVITY SEVERELY
GIVING WAY, BUCKLING OR SHIFTING OF KNEE: THE SYMPTOM AFFECTS MY ACTIVITY SEVERELY
GO UP STAIRS: ACTIVITY IS VERY DIFFICULT
WALK: ACTIVITY IS VERY DIFFICULT
PLEASE_INDICATE_YOR_PRIMARY_REASON_FOR_REFERRAL_TO_THERAPY:: KNEE
STAND: ACTIVITY IS VERY DIFFICULT
SIT WITH YOUR KNEE BENT: ACTIVITY IS VERY DIFFICULT
STAND: ACTIVITY IS VERY DIFFICULT
KNEEL ON THE FRONT OF YOUR KNEE: I AM UNABLE TO DO THE ACTIVITY
PAIN: THE SYMPTOM AFFECTS MY ACTIVITY SEVERELY
WALK: ACTIVITY IS VERY DIFFICULT
RISE FROM A CHAIR: ACTIVITY IS VERY DIFFICULT
HOW_WOULD_YOU_RATE_THE_OVERALL_FUNCTION_OF_YOUR_KNEE_DURING_YOUR_USUAL_DAILY_ACTIVITIES?: SEVERELY ABNORMAL
STIFFNESS: THE SYMPTOM AFFECTS MY ACTIVITY SEVERELY
AS_A_RESULT_OF_YOUR_KNEE_INJURY,_HOW_WOULD_YOU_RATE_YOUR_CURRENT_LEVEL_OF_DAILY_ACTIVITY?: SEVERELY ABNORMAL
SIT WITH YOUR KNEE BENT: ACTIVITY IS VERY DIFFICULT
SWELLING: THE SYMPTOM AFFECTS MY ACTIVITY MODERATELY
KNEE_ACTIVITY_OF_DAILY_LIVING_SUM: 11
WEAKNESS: THE SYMPTOM PREVENTS ME FROM ALL DAILY ACTIVITIES
GO DOWN STAIRS: I AM UNABLE TO DO THE ACTIVITY
AS_A_RESULT_OF_YOUR_KNEE_INJURY,_HOW_WOULD_YOU_RATE_YOUR_CURRENT_LEVEL_OF_DAILY_ACTIVITY?: SEVERELY ABNORMAL
LIMPING: THE SYMPTOM AFFECTS MY ACTIVITY SEVERELY
HOW_WOULD_YOU_RATE_THE_CURRENT_FUNCTION_OF_YOUR_KNEE_DURING_YOUR_USUAL_DAILY_ACTIVITIES_ON_A_SCALE_FROM_0_TO_100_WITH_100_BEING_YOUR_LEVEL_OF_KNEE_FUNCTION_PRIOR_TO_YOUR_INJURY_AND_0_BEING_THE_INABILITY_TO_PERFORM_ANY_OF_YOUR_USUAL_DAILY_ACTIVITIES?: 10
HOW_WOULD_YOU_RATE_THE_CURRENT_FUNCTION_OF_YOUR_KNEE_DURING_YOUR_USUAL_DAILY_ACTIVITIES_ON_A_SCALE_FROM_0_TO_100_WITH_100_BEING_YOUR_LEVEL_OF_KNEE_FUNCTION_PRIOR_TO_YOUR_INJURY_AND_0_BEING_THE_INABILITY_TO_PERFORM_ANY_OF_YOUR_USUAL_DAILY_ACTIVITIES?: 10
WEAKNESS: THE SYMPTOM PREVENTS ME FROM ALL DAILY ACTIVITIES
KNEEL ON THE FRONT OF YOUR KNEE: I AM UNABLE TO DO THE ACTIVITY
HOW_WOULD_YOU_RATE_THE_OVERALL_FUNCTION_OF_YOUR_KNEE_DURING_YOUR_USUAL_DAILY_ACTIVITIES?: SEVERELY ABNORMAL
LIMPING: THE SYMPTOM AFFECTS MY ACTIVITY SEVERELY
RISE FROM A CHAIR: ACTIVITY IS VERY DIFFICULT

## 2025-05-19 ENCOUNTER — THERAPY VISIT (OUTPATIENT)
Dept: PHYSICAL THERAPY | Facility: REHABILITATION | Age: 70
End: 2025-05-19
Attending: ORTHOPAEDIC SURGERY
Payer: COMMERCIAL

## 2025-05-19 DIAGNOSIS — M25.562 ACUTE PAIN OF LEFT KNEE: Primary | ICD-10-CM

## 2025-05-19 PROCEDURE — 97110 THERAPEUTIC EXERCISES: CPT | Mod: GP | Performed by: PHYSICAL THERAPIST

## 2025-05-19 PROCEDURE — 97161 PT EVAL LOW COMPLEX 20 MIN: CPT | Mod: GP | Performed by: PHYSICAL THERAPIST

## 2025-05-19 NOTE — PROGRESS NOTES
PHYSICAL THERAPY EVALUATION  Type of Visit: Evaluation       Fall Risk Screen:  Have you fallen 2 or more times in the past year?: No  Have you fallen and had an injury in the past year?: No    Subjective         Presenting condition or subjective complaint: Left knee replacement  DOS: 5/14/25  Taking 2 500 mg of tylenol every 6 hours, taking oxy as well   Icing 30 on and 60 off. See doc tomorrow. Doing quite well and managing with pain. Has been doing her exercises with help from her spouse. Has noted a few instances of urinary incontinence. Has a hard time lifting her L leg right now, but is getting a bit better each day.     Date of onset: 05/14/25 (MD visit)    Relevant medical history:     Dates & types of surgery: See chart    Prior diagnostic imaging/testing results: X-ray     Prior therapy history for the same diagnosis, illness or injury: Yes        Living Environment  Social support: With a significant other or spouse   Type of home: House   Stairs to enter the home: Yes 2 Is there a railing: No     Ramp: No   Stairs inside the home: Yes 13 Is there a railing: Yes     Help at home: Self Cares (home health aide/personal care attendant, family, etc); Home management tasks (cooking, cleaning); Medication and/or finances; Home and Yard maintenance tasks; Assist for driving and community activities  Equipment owned: Walker with wheels     Employment: Not Applicable    Hobbies/Interests: Gardening, walking, reading    Patient goals for therapy: Walk    Pain assessment: See objective evaluation for additional pain details     Objective   KNEE EVALUATION  PAIN: Pain Level at Rest: 2/10  Pain Level with Use: 8/10  Pain Location: knee  INTEGUMENTARY (edema, incisions): incision covered by dressing but appears well healing. Purple bruise visible of L lateral thigh. Wearing bilateral FERNANDO hose and ACE wrap   GAIT:  Weightbearing Status: WBAT  Assistive Device(s): Cane (single end), Walker (four wheeled)  Gait  Deviations: Antalgic  Base of support decreased  Stride length decreased  Felicia decreased step to pattern   ROM: Knee flexion 78 deg edge of plinth, 82 deg supine with belt assist. Lacking 15 deg extension    STRENGTH: quad: 3+/5 unable to perform SLR     Assessment & Plan   CLINICAL IMPRESSIONS  Medical Diagnosis: S/P Left TKA    Treatment Diagnosis: S/P Left TKA   Impression/Assessment: Patient is a 70 year old female with knee  complaints.  The following significant findings have been identified: Pain, Decreased ROM/flexibility, Decreased joint mobility, Decreased strength, Decreased proprioception, Impaired sensation, Inflammation, Edema, Impaired gait, Impaired muscle performance, Decreased activity tolerance, and Impaired posture. These impairments interfere with their ability to perform self care tasks, work tasks, recreational activities, household chores, driving , household mobility, and community mobility as compared to previous level of function.     Clinical Decision Making (Complexity):  Clinical Presentation: Stable/Uncomplicated  Clinical Presentation Rationale: based on medical and personal factors listed in PT evaluation  Clinical Decision Making (Complexity): Low complexity    PLAN OF CARE  Treatment Interventions:  Modalities: Cryotherapy, Cupping, E-stim, Hot Pack  Interventions: Gait Training, Manual Therapy, Neuromuscular Re-education, Therapeutic Activity, Therapeutic Exercise, Self-Care/Home Management    Long Term Goals     PT Goal 1  Goal Identifier: Ambulation  Goal Description: The patient will be able to ambulate x30 minutes with pain <3/10.  Rationale: to maximize safety and independence with performance of ADLs and functional tasks;to maximize safety and independence within the home;to maximize safety and independence within the community  Target Date: 08/16/25  PT Goal 2  Goal Identifier: Stairs  Goal Description: The patient will be able to negotiate a flight of stairs with  reciprocal  gait pattern and pain <3/10.  Rationale: to maximize safety and independence with performance of ADLs and functional tasks;to maximize safety and independence within the home;to maximize safety and independence within the community  Target Date: 08/16/25      Frequency of Treatment: 1x/week tapering to 1x every 2 weeks  Duration of Treatment: 90 days    Recommended Referrals to Other Professionals:   Education Assessment:   Learner/Method: Patient  Education Comments: Eager to participate in therapy. Patient demonstrates understanding of plan of care and consents to treatment.    Risks and benefits of evaluation/treatment have been explained.   Patient/Family/caregiver agrees with Plan of Care.     Evaluation Time:     PT Eval, Low Complexity Minutes (38005): 15       Signing Clinician: Deedee Vale, PT        [facility]                                                                                   OUTPATIENT PHYSICAL THERAPY      PLAN OF TREATMENT FOR OUTPATIENT REHABILITATION   Patient's Last Name, First Name, DEVINEleazarLINAEleazar  HeatherClarisse BELTRAN YOB: 1955   Provider's Name   [facility]   Medical Record No.  [id number]     Onset Date: 05/14/25 (MD visit)  Start of Care Date: 05/19/25     Medical Diagnosis:  S/P Left TKA      PT Treatment Diagnosis:  S/P Left TKA Plan of Treatment  Frequency/Duration: 1x/week tapering to 1x every 2 weeks/ 90 days    Certification date from 05/19/25 to 08/16/25         See note for plan of treatment details and functional goals     Deedee Vale, PT                         I CERTIFY THE NEED FOR THESE SERVICES FURNISHED UNDER        THIS PLAN OF TREATMENT AND WHILE UNDER MY CARE .             Physician Signature               Date    X_____________________________________________________                  Referring Provider:  Jcarlos Cruz    Initial Assessment  See Epic Evaluation- Start  of Care Date: 05/19/25

## 2025-05-20 ENCOUNTER — TRANSFERRED RECORDS (OUTPATIENT)
Dept: HEALTH INFORMATION MANAGEMENT | Facility: CLINIC | Age: 70
End: 2025-05-20

## 2025-05-22 ENCOUNTER — THERAPY VISIT (OUTPATIENT)
Dept: PHYSICAL THERAPY | Facility: REHABILITATION | Age: 70
End: 2025-05-22
Attending: ORTHOPAEDIC SURGERY
Payer: COMMERCIAL

## 2025-05-22 DIAGNOSIS — M25.562 ACUTE PAIN OF LEFT KNEE: Primary | ICD-10-CM

## 2025-05-27 ENCOUNTER — THERAPY VISIT (OUTPATIENT)
Dept: PHYSICAL THERAPY | Facility: REHABILITATION | Age: 70
End: 2025-05-27
Attending: ORTHOPAEDIC SURGERY
Payer: COMMERCIAL

## 2025-05-27 DIAGNOSIS — M25.562 ACUTE PAIN OF LEFT KNEE: Primary | ICD-10-CM

## 2025-05-27 PROCEDURE — 97110 THERAPEUTIC EXERCISES: CPT | Mod: GP | Performed by: PHYSICAL THERAPIST

## 2025-05-27 PROCEDURE — 97140 MANUAL THERAPY 1/> REGIONS: CPT | Mod: GP | Performed by: PHYSICAL THERAPIST

## 2025-05-29 ENCOUNTER — THERAPY VISIT (OUTPATIENT)
Dept: PHYSICAL THERAPY | Facility: REHABILITATION | Age: 70
End: 2025-05-29
Attending: ORTHOPAEDIC SURGERY
Payer: COMMERCIAL

## 2025-05-29 DIAGNOSIS — M25.562 ACUTE PAIN OF LEFT KNEE: Primary | ICD-10-CM

## 2025-06-02 ENCOUNTER — THERAPY VISIT (OUTPATIENT)
Dept: PHYSICAL THERAPY | Facility: REHABILITATION | Age: 70
End: 2025-06-02
Payer: COMMERCIAL

## 2025-06-02 DIAGNOSIS — M25.562 ACUTE PAIN OF LEFT KNEE: Primary | ICD-10-CM

## 2025-06-02 PROCEDURE — 97140 MANUAL THERAPY 1/> REGIONS: CPT | Mod: GP | Performed by: PHYSICAL THERAPIST

## 2025-06-02 PROCEDURE — 97110 THERAPEUTIC EXERCISES: CPT | Mod: GP | Performed by: PHYSICAL THERAPIST

## 2025-06-10 ENCOUNTER — THERAPY VISIT (OUTPATIENT)
Dept: PHYSICAL THERAPY | Facility: REHABILITATION | Age: 70
End: 2025-06-10
Payer: COMMERCIAL

## 2025-06-10 ENCOUNTER — TRANSFERRED RECORDS (OUTPATIENT)
Dept: HEALTH INFORMATION MANAGEMENT | Facility: CLINIC | Age: 70
End: 2025-06-10

## 2025-06-10 DIAGNOSIS — M25.562 ACUTE PAIN OF LEFT KNEE: Primary | ICD-10-CM

## 2025-06-10 PROCEDURE — 97110 THERAPEUTIC EXERCISES: CPT | Mod: GP | Performed by: PHYSICAL THERAPIST

## 2025-06-10 PROCEDURE — 97140 MANUAL THERAPY 1/> REGIONS: CPT | Mod: GP | Performed by: PHYSICAL THERAPIST

## 2025-06-10 PROCEDURE — 97112 NEUROMUSCULAR REEDUCATION: CPT | Mod: GP | Performed by: PHYSICAL THERAPIST

## 2025-06-10 NOTE — PROGRESS NOTES
Assessment: the patient is nearly 4 weeks post total knee repalcement and progressing well. ROM knee flexion 110 deg and extension lacking 3 deg with good SLR and no quad lag. Progressed to walking with a cane vs. walker and pain is managed with ice and medication. Tolerating therapy well with improvements in mobility, gait and strength.         PLAN  Continue therapy per current plan of care. Tapering to 1x/week     Beginning/End Dates of Progress Note Reporting Period:  05/19/25 to 06/10/2025    Referring Provider:  Jcarlos Cruz     06/10/25 0500   Appointment Info   Signing clinician's name / credentials Deedee White, PT DPT   Total/Authorized Visits E&T   Visits Used 7   Medical Diagnosis S/P Left TKA   PT Tx Diagnosis S/P Left TKA   Progress Note/Certification   Start of Care Date 05/19/25   Onset of illness/injury or Date of Surgery 05/14/25  (MD visit)   Therapy Frequency 1x/week tapering to 1x every 2 weeks   Predicted Duration 90 days   Certification date from 05/19/25   Certification date to 08/16/25   Progress Note Completed Date 05/19/25   GOALS   PT Goals 2   PT Goal 1   Goal Identifier Ambulation   Goal Description The patient will be able to ambulate x30 minutes with pain <3/10.   Rationale to maximize safety and independence with performance of ADLs and functional tasks;to maximize safety and independence within the home;to maximize safety and independence within the community   Target Date 08/16/25   PT Goal 2   Goal Identifier Stairs   Goal Description The patient will be able to negotiate a flight of stairs with reciprocal  gait pattern and pain <3/10.   Rationale to maximize safety and independence with performance of ADLs and functional tasks;to maximize safety and independence within the home;to maximize safety and independence within the community   Target Date 08/16/25   Subjective Report   Subjective Report The patient reports that she has been a bit more sore. Did her exercises 3  "times yesterday and then had a harder time sleeping.   Objective Measures   Objective Measures Objective Measure 1;Objective Measure 2   Objective Measure 1   Objective Measure Pain Rating   Details 3/10 and soreness rather pain   Objective Measure 2   Objective Measure Knee AROM: 110 deg supine, lacking 3 deg extension   Details SLR x10 no quad lag   Treatment Interventions (PT)   Interventions Therapeutic Procedure/Exercise;Manual Therapy;Neuromuscular Re-education   Therapeutic Procedure/Exercise   Therapeutic Procedures: strength, endurance, ROM, flexibility minutes ([id number]) 15   Therapeutic Procedures Ther Proc 2;Ther Proc 3;Ther Proc 4;Ther Proc 5   Ther Proc 1 - Details Recumbent bike x7 minutes-rocking at first then full revolutions with subjective assessment   Ther Proc 2 HS curl: 2 sets x 10 reps on L, green gym TB   Ther Proc 4 squats x15   Ther Proc 5 Seated heel slide in chair x5 10\" holds   PTRx Ther Proc 1 Standing Gastroc Stretch   PTRx Ther Proc 1 - Details 3x30\" holds    PTRx Ther Proc 2 step ups x10 leny   Skilled Intervention reviewed and progressed   Patient Response/Progress tolerated well   Neuromuscular Re-education   Neuromuscular re-ed of mvmt, balance, coord, kinesthetic sense, posture, proprioception minutes (21508) 8   Neuro Re-ed 1 SL balance 2x20\" holds leny   Neuro Re-ed 1 - Details Semi tandem stance 2x20\" holds B   PTRx Neuro Re-ed 1 lateral stepping x2 lengths   Skilled Intervention Instructed pt in balance ex, CGA/SBA for minimal LOB, completed in // bars   Patient Response/Progress pt tolerated well, no pain   Manual Therapy   Manual Therapy: Mobilization, MFR, MLD, friction massage minutes (78138) 15   Manual Therapy 1 STM to knee and thigh in supine legs elevated on bolster   Skilled Intervention to improve pain and mobility   Patient Response/Progress significant improvement in pain and tightness in upper thigh   Education   Learner/Method Patient   Education Comments Eager to " participate in therapy. Patient demonstrates understanding of plan of care and consents to treatment.   Plan   Home program PTRX on phone and printed   Plan for next session continue with knee flexion ROM, MT as helpful, progress WB balance and hip series, step ups   Comments   Comments Assessment: the patient is nearly 4 weeks post total knee repalcement and progressing well. ROM knee flexion 110 deg and extension lacking 3 deg with good SLR and no quad lag. Progressed to walking with a cane vs. walker and pain is managed with ice and medication. Tolerating therapy well with improvements in mobility, gait and strength.

## 2025-06-12 DIAGNOSIS — I10 ESSENTIAL HYPERTENSION: ICD-10-CM

## 2025-06-12 RX ORDER — LOSARTAN POTASSIUM 100 MG/1
100 TABLET ORAL DAILY
Qty: 90 TABLET | Refills: 0 | Status: SHIPPED | OUTPATIENT
Start: 2025-06-12

## 2025-06-12 RX ORDER — ATENOLOL 50 MG/1
50 TABLET ORAL DAILY
Qty: 90 TABLET | Refills: 0 | Status: SHIPPED | OUTPATIENT
Start: 2025-06-12

## 2025-06-18 ENCOUNTER — THERAPY VISIT (OUTPATIENT)
Dept: PHYSICAL THERAPY | Facility: REHABILITATION | Age: 70
End: 2025-06-18
Payer: COMMERCIAL

## 2025-06-18 DIAGNOSIS — M25.562 ACUTE PAIN OF LEFT KNEE: Primary | ICD-10-CM

## 2025-06-18 PROCEDURE — 97110 THERAPEUTIC EXERCISES: CPT | Mod: GP | Performed by: PHYSICAL THERAPIST

## 2025-06-18 PROCEDURE — 97140 MANUAL THERAPY 1/> REGIONS: CPT | Mod: GP | Performed by: PHYSICAL THERAPIST

## 2025-06-25 ENCOUNTER — THERAPY VISIT (OUTPATIENT)
Dept: PHYSICAL THERAPY | Facility: REHABILITATION | Age: 70
End: 2025-06-25
Payer: COMMERCIAL

## 2025-06-25 DIAGNOSIS — M25.562 ACUTE PAIN OF LEFT KNEE: Primary | ICD-10-CM

## 2025-06-25 PROCEDURE — 97110 THERAPEUTIC EXERCISES: CPT | Mod: GP | Performed by: PHYSICAL THERAPIST

## 2025-06-25 PROCEDURE — 97140 MANUAL THERAPY 1/> REGIONS: CPT | Mod: GP | Performed by: PHYSICAL THERAPIST

## 2025-06-25 NOTE — PROGRESS NOTES
06/25/25 0500   Appointment Info   Signing clinician's name / credentials Deedee White, PT DPT   Total/Authorized Visits E&T   Visits Used 10   Medical Diagnosis S/P Left TKA   PT Tx Diagnosis S/P Left TKA   Other pertinent information pt arrives late today   Progress Note/Certification   Start of Care Date 05/19/25   Onset of illness/injury or Date of Surgery 05/14/25  (MD visit)   Therapy Frequency 1x/week tapering to 1x every 2 weeks   Predicted Duration 90 days   Certification date from 05/19/25   Certification date to 08/16/25   Progress Note Completed Date 05/19/25   GOALS   PT Goals 2   PT Goal 1   Goal Identifier Ambulation   Goal Description The patient will be able to ambulate x30 minutes with pain <3/10.   Rationale to maximize safety and independence with performance of ADLs and functional tasks;to maximize safety and independence within the home;to maximize safety and independence within the community   Goal Progress goal progressing gradually   Target Date 08/16/25   PT Goal 2   Goal Identifier Stairs   Goal Description The patient will be able to negotiate a flight of stairs with reciprocal  gait pattern and pain <3/10.   Rationale to maximize safety and independence with performance of ADLs and functional tasks;to maximize safety and independence within the home;to maximize safety and independence within the community   Goal Progress goal progressing gradually   Target Date 08/16/25   Subjective Report   Subjective Report The patient reports that she is having a hard time this week. Is not sleeping well due to having to go to the bathroom frequently and also feels like she is having some headaches. Reports pre operative headaches over the past 2 years but feels that they are bad as of late. Notes some mild warmth in her knee.   Objective Measures   Objective Measures Objective Measure 1;Objective Measure 2;Objective Measure 3   Objective Measure 1   Objective Measure Pain Rating   Details  "3/10 and soreness rather pain   Objective Measure 2   Objective Measure Knee AROM: 110 deg supine, lacking 3 deg extension   Details SLR x10 no quad lag   Objective Measure 3   Objective Measure mild erythema around incision, incision well closed, no increase in swellling noted today   Treatment Interventions (PT)   Interventions Therapeutic Procedure/Exercise;Manual Therapy;Neuromuscular Re-education   Therapeutic Procedure/Exercise   Therapeutic Procedures: strength, endurance, ROM, flexibility minutes (93079) 8   Therapeutic Procedures Ther Proc 2;Ther Proc 3;Ther Proc 4;Ther Proc 5   Ther Proc 1 - Details Recumbent bike x7 minutes-rocking at first then full revolutions with subjective assessment   Ther Proc 2 educated regarding tapering down her exercises and trying more resting. Increasing icing knee and heating quad. Educated on desensitization strategies. Discussed reaching out to PCP regarding headaches and urinary sypmptoms.   PTRx Ther Proc 1 Standing Hip Abduction   PTRx Ther Proc 1 - Details HEP   PTRx Ther Proc 2 Standing Gastroc Stretch   PTRx Ther Proc 2 - Details HEP   PTRx Ther Proc 3 Hip Flexion Straight Leg Raise   PTRx Ther Proc 3 - Details HEP   PTRx Ther Proc 4 Short Arc Knee Extension   PTRx Ther Proc 4 - Details HEP   Skilled Intervention just recumbant bike today due to increased pain   Patient Response/Progress pain initally on bike then improved with increased time   Neuromuscular Re-education   Neuromuscular Re-education Neuro Re-ed 2;Neuro Re-ed 3   Neuro Re-ed 1 SL balance 2x20\" holds leny   Neuro Re-ed 1 - Details Semi tandem stance 2x20\" holds B   Neuro Re-ed 2 stair negotiation step over step x4 laps challenge with descent due to L knee eccentric quad strength   PTRx Neuro Re-ed 1 Tandem Stance   PTRx Neuro Re-ed 1 - Details 3x30\" holds    PTRx Neuro Re-ed 2 Romberg Eyes Open on Pillow   PTRx Neuro Re-ed 2 - Details 3x30\" holds on pillow/cushion    Skilled Intervention Instructed pt " in balance ex, CGA/SBA for minimal LOB, completed in // bars   Patient Response/Progress pt tolerated well, no pain   Manual Therapy   Manual Therapy: Mobilization, MFR, MLD, friction massage minutes ([id number]) 23   Manual Therapy 1 STM to L knee, quad, hamstring and calf in supine with leg bentt   Skilled Intervention to improve pain and mobility   Patient Response/Progress improved pain and knee mobility following   Education   Learner/Method Patient   Education Comments Eager to participate in therapy. Patient demonstrates understanding of plan of care and consents to treatment.   Plan   Home program PTRX on phone and printed   Plan for next session continue with knee flexion ROM, MT as helpful, progress WB balance and hip series, step ups   Comments   Comments The patient is having a more challenging week due to pain and lack of sleep. The patient will benefit from fewer exercise sessions per day and increased icing and heating to calm symptoms down.The patient does not present with any red flag symptoms, but is in a bit more pain today so tailored session to manage this today.   Total Session Time   Timed Code Treatment Minutes 31   Total Treatment Time (sum of timed and untimed services) 31       PLAN  Continue therapy per current plan of care.    Beginning/End Dates of Progress Note Reporting Period:  05/19/25 to 06/25/2025    Referring Provider:  Jcarlos Cruz

## 2025-07-02 ENCOUNTER — THERAPY VISIT (OUTPATIENT)
Dept: PHYSICAL THERAPY | Facility: REHABILITATION | Age: 70
End: 2025-07-02
Payer: COMMERCIAL

## 2025-07-02 DIAGNOSIS — M25.562 ACUTE PAIN OF LEFT KNEE: Primary | ICD-10-CM

## 2025-07-02 PROCEDURE — 97110 THERAPEUTIC EXERCISES: CPT | Mod: GP

## 2025-07-02 PROCEDURE — 97140 MANUAL THERAPY 1/> REGIONS: CPT | Mod: GP

## 2025-07-04 ASSESSMENT — SLEEP AND FATIGUE QUESTIONNAIRES
HOW LIKELY ARE YOU TO NOD OFF OR FALL ASLEEP WHILE SITTING AND READING: SLIGHT CHANCE OF DOZING
HOW LIKELY ARE YOU TO NOD OFF OR FALL ASLEEP WHILE SITTING QUIETLY AFTER LUNCH WITHOUT ALCOHOL: WOULD NEVER DOZE
HOW LIKELY ARE YOU TO NOD OFF OR FALL ASLEEP IN A CAR, WHILE STOPPED FOR A FEW MINUTES IN TRAFFIC: WOULD NEVER DOZE
HOW LIKELY ARE YOU TO NOD OFF OR FALL ASLEEP WHILE SITTING INACTIVE IN A PUBLIC PLACE: WOULD NEVER DOZE
HOW LIKELY ARE YOU TO NOD OFF OR FALL ASLEEP WHILE LYING DOWN TO REST IN THE AFTERNOON WHEN CIRCUMSTANCES PERMIT: SLIGHT CHANCE OF DOZING
HOW LIKELY ARE YOU TO NOD OFF OR FALL ASLEEP WHILE SITTING AND TALKING TO SOMEONE: WOULD NEVER DOZE
HOW LIKELY ARE YOU TO NOD OFF OR FALL ASLEEP WHEN YOU ARE A PASSENGER IN A CAR FOR AN HOUR WITHOUT A BREAK: SLIGHT CHANCE OF DOZING
HOW LIKELY ARE YOU TO NOD OFF OR FALL ASLEEP WHILE WATCHING TV: SLIGHT CHANCE OF DOZING

## 2025-07-09 ENCOUNTER — THERAPY VISIT (OUTPATIENT)
Dept: PHYSICAL THERAPY | Facility: REHABILITATION | Age: 70
End: 2025-07-09
Payer: COMMERCIAL

## 2025-07-09 ENCOUNTER — VIRTUAL VISIT (OUTPATIENT)
Dept: SLEEP MEDICINE | Facility: CLINIC | Age: 70
End: 2025-07-09
Payer: COMMERCIAL

## 2025-07-09 VITALS — BODY MASS INDEX: 37.59 KG/M2 | WEIGHT: 248 LBS | HEIGHT: 68 IN

## 2025-07-09 DIAGNOSIS — M25.562 ACUTE PAIN OF LEFT KNEE: Primary | ICD-10-CM

## 2025-07-09 DIAGNOSIS — G47.33 OSA ON CPAP: Primary | ICD-10-CM

## 2025-07-09 PROCEDURE — 97110 THERAPEUTIC EXERCISES: CPT | Mod: GP | Performed by: PHYSICAL THERAPIST

## 2025-07-09 PROCEDURE — 97112 NEUROMUSCULAR REEDUCATION: CPT | Mod: GP | Performed by: PHYSICAL THERAPIST

## 2025-07-09 PROCEDURE — 98005 SYNCH AUDIO-VIDEO EST LOW 20: CPT | Performed by: INTERNAL MEDICINE

## 2025-07-09 PROCEDURE — 1125F AMNT PAIN NOTED PAIN PRSNT: CPT | Mod: 95 | Performed by: INTERNAL MEDICINE

## 2025-07-09 PROCEDURE — 97140 MANUAL THERAPY 1/> REGIONS: CPT | Mod: GP | Performed by: PHYSICAL THERAPIST

## 2025-07-09 ASSESSMENT — PAIN SCALES - GENERAL: PAINLEVEL_OUTOF10: MILD PAIN (2)

## 2025-07-09 NOTE — PROGRESS NOTES
Virtual Visit Details    Type of service:  Video Visit   Video Start Time: 207pm  Video End Time:219pm    Originating Location (pt. Location): Home    Distant Location (provider location):  Off-site  Platform used for Video Visit: [facility]  Sleep clinic follow-up visit note      Chief complaint: Follow-up of AMINATA, review CPAP compliance measures       HPI: Clarisse Romero presents for follow-up of their previously diagnosed obstructive sleep apnea managed with CPAP therapy.       Clarisse Romero was set up at [facility] on December 18, 2023. Patient received a Resmed Airsense 10 Pressures were set at 5-15 cm H2O.   Patient s ramp is 5 cm H2O for Auto and FLEX/EPR is EPR, 3.  Patient received a VibeWrite Mask name: Vitera Full Face mask size Small, heated tubing and heated humidifier.     During her last sleep clinic visit, pressure settings on her CPAP were revised to range 8 to 15 cm water.  She reports using the CPAP regularly during sleep and reports that she tolerates the revised pressure settings on her CPAP.  She underwent left knee arthroplasty on May 14, 2025 . Her sleep has been lately disrupted due to the knee pain.  Her exercise capacity is currently limited since the knee surgery. She also reports right knee pain and that the right knee also needs to be replaced.    Her weight at the time of the [facility] HST obtained on December 30, 2024 was 251 pounds and her current weight is 248 pounds.     Do you use a CPAP Machine at home: (Patient-Rptd) Yes  Overall, on a scale of 0-10 how would you rate your CPAP (0 poor, 10 great): (Patient-Rptd) 9    What type of mask do you use: (Patient-Rptd) Full Face Mask  Is your mask comfortable: (Patient-Rptd) Yes  If not, why:      Is your mask leaking: (Patient-Rptd) No  If yes, where do you feel it:    How many night per week does the mask leak (0-7):      Do you notice snoring with mask on: (Patient-Rptd) No  Do you notice gasping  arousals with mask on: (Patient-Rptd) No  Are you having significant oral or nasal dryness: (Patient-Rptd) No  Is the pressure setting comfortable: (Patient-Rptd) Yes  If not, why:      What is your typical bedtime: (Patient-Rptd) 10:00 pm  How long does it take you to go to sleep on PAP therapy: (Patient-Rptd) 10-15 minutes  What time do you typically get out of bed for the day: (Patient-Rptd) 8:00am  How many hours on average per night are you using PAP therapy: (Patient-Rptd) 10 hours  How many hours are you sleeping per night: (Patient-Rptd) Not sure  Do you feel well rested in the morning: Yes, but feels tired between 2-4pm since the knee surgery      ResMed    Auto-PAP 8.0 - 15.0 cmH2O 30 day usage data:    100% of days with > 4 hours of use. 0/30 days with no use.   Average use 9 hours and 19 minutes per day.   95%ile Leak 16.8 L/min.   CPAP 95th percentile pressure 11.0 cmH2O.   AHI 0.6 events per hour.        EPWORTH SLEEPINESS SCALE         7/4/2025    11:12 AM    [place] Sleepiness Scale ( JAYDEN Chaparro  [id number]<br>ESS - [place]/English - Final version - 21 Nov 07 - [facility] [place].)   Sitting and reading Slight chance of dozing   Watching TV Slight chance of dozing   Sitting, inactive in a public place (e.g. a theatre or a meeting) Would never doze   As a passenger in a car for an hour without a break Slight chance of dozing   Lying down to rest in the afternoon when circumstances permit Slight chance of dozing   Sitting and talking to someone Would never doze   Sitting quietly after a lunch without alcohol Would never doze   In a car, while stopped for a few minutes in traffic Would never doze   [place] Score (MC) 4   [place] Score (Sleep) 4        Patient-reported       INSOMNIA SEVERITY INDEX (TAMIE)          7/4/2025    11:05 AM   Insomnia Severity Index (TAMIE)   Difficulty falling asleep 1   Difficulty staying asleep 1   Problems waking up too early 1   How SATISFIED/DISSATISFIED are you with your  CURRENT sleep pattern? 1   How NOTICEABLE to others do you think your sleep problem is in terms of impairing the quality of your life? 1   How WORRIED/DISTRESSED are you about your current sleep problem? 1   To what extent do you consider your sleep problem to INTERFERE with your daily functioning (e.g. daytime fatigue, mood, ability to function at work/daily chores, concentration, memory, mood, etc.) CURRENTLY? 1   TAMIE Total Score 7        Patient-reported       Guidelines for Scoring/Interpretation:  Total score categories:  0-7 = No clinically significant insomnia   8-14 = Subthreshold insomnia   15-21 = Clinical insomnia (moderate severity)  22-28 = Clinical insomnia (severe)  Used via courtesy of www.[place]alth.[place].gov with permission from Silvio Fermin PhD., [facility]      Past medical/surgical history, family history, social history, medications and allergies were reviewed.        Problem List:  Patient Active Problem List    Diagnosis Date Noted    Acute pain of left knee 05/19/2025     Priority: Medium    AMINATA (obstructive sleep apnea) 12/14/2023     Priority: Medium     12/12/2023 [facility] (252.0 lbs) - AHI 38.8, RDI 41.3, Supine AHI 96.9, REM AHI 7.3, Low O2 82.0%, Time Spent <=88% 58.0 minutes / Time Spent <=89% 118.7 minutes.      Chronic pain of both knees 09/06/2023     Priority: Medium    Class 2 severe obesity due to excess calories with serious comorbidity in adult (H) 08/28/2023     Priority: Medium    Urinary incontinence 08/22/2023     Priority: Medium    Benign neoplasm of descending colon 11/14/2019     Priority: Medium    Diverticular disease of large intestine 11/12/2019     Priority: Medium    Major depression in partial remission      Priority: Medium     Created by Conversion        Migraine Headache      Priority: Medium     Created by Conversion  Replacement Utility updated for latest IMO load        Hypertension      Priority: Medium     Created by  "Conversion  Replacement Utility updated for latest IMO load        Sinusitis      Priority: Medium     Created by Conversion  Replacement Utility updated for latest IMO load        Allergic rhinitis 01/26/2016     Priority: Medium    Joint Pain, Localized In The Knee      Priority: Medium     Created by Conversion                   Physical Examination:     Ht 1.727 m (5' 8\")   Wt 112.5 kg (248 lb)   BMI 37.71 kg/m    General: Pleasant. Cooperative. In no apparent distress.  Pulmonary: Able to speak in full sentences easily. No cough or wheeze.   Neurologic: Alert, oriented x3.  Psychiatric: Mood euthymic. Affect congruent with full range and intensity.     ASSESSMENT/PLAN:  Obstructive sleep apnea: Pt reports adequate compliance with CPAP and reports positive benefits with CPAP treatment. Based on compliance measures, AMINATA is adequately controlled with CPAP at the current settings.    DME orders were generated for renewal of CPAP supplies.  Recommended to continue using the CPAP regularly during sleep and getting the supplies for the CPAP replaced regularly.   Patient instructed to remember to bring PAP with her if hospitalized and if anticipating procedure that requires sedation/surgery to be sure to discuss with the provider/surgeon that she has sleep apnea and uses PAP therapy.      Obesity: We discussed weight management with healthy diet, and exercise as tolerated given the limitations due to knee pain.     Patient was strongly advised to avoid driving, operating any heavy machinery or other hazardous situations while drowsy or sleepy.  Patient was counseled on the importance of driving while alert, to pull over if drowsy, or nap before getting into the vehicle if sleepy.      Follow-up with sleep clinic via video visit in 1 year  or sooner if any concerns.     The above note was dictated using voice recognition software. Although reviewed after completion, some word and grammatical error may remain . Please " "contact the author for any clarifications.      \" Total time spent was 25 minutes  for this appointment on this date of service which include time spent before, during and after the visit for chart review, patient care, counseling and coordination of care. Including documentation\"      Beto Hernandez MD  [facility]  [address] , [place] [address]     "

## 2025-07-09 NOTE — NURSING NOTE
Current patient location: [address] [place] [id number]    Is the patient currently in the state of [place]? YES    Visit mode: VIDEO    If the visit is dropped, the patient can be reconnected by:VIDEO VISIT: Text to cell phone:   Telephone Information:   Mobile [phone]       Will anyone else be joining the visit? NO  (If patient encounters technical issues they should call [phone])    Are changes needed to the allergy or medication list? Pt stated no changes to allergies and Pt stated no med changes    Are refills needed on medications prescribed by this physician? NO    Rooming Documentation:  Questionnaire(s) completed    Reason for visit: RECHELENITA REDDYF

## 2025-07-10 ENCOUNTER — PATIENT OUTREACH (OUTPATIENT)
Dept: CARE COORDINATION | Facility: CLINIC | Age: 70
End: 2025-07-10
Payer: COMMERCIAL

## 2025-07-16 ENCOUNTER — THERAPY VISIT (OUTPATIENT)
Dept: PHYSICAL THERAPY | Facility: REHABILITATION | Age: 70
End: 2025-07-16
Payer: COMMERCIAL

## 2025-07-16 DIAGNOSIS — M25.562 ACUTE PAIN OF LEFT KNEE: Primary | ICD-10-CM

## 2025-07-16 PROCEDURE — 97140 MANUAL THERAPY 1/> REGIONS: CPT | Mod: GP | Performed by: PHYSICAL THERAPIST

## 2025-07-16 PROCEDURE — 97112 NEUROMUSCULAR REEDUCATION: CPT | Mod: GP | Performed by: PHYSICAL THERAPIST

## 2025-07-16 PROCEDURE — 97110 THERAPEUTIC EXERCISES: CPT | Mod: GP | Performed by: PHYSICAL THERAPIST

## 2025-07-23 ENCOUNTER — THERAPY VISIT (OUTPATIENT)
Dept: PHYSICAL THERAPY | Facility: REHABILITATION | Age: 70
End: 2025-07-23
Payer: COMMERCIAL

## 2025-07-23 DIAGNOSIS — M25.562 ACUTE PAIN OF LEFT KNEE: Primary | ICD-10-CM

## 2025-07-23 PROCEDURE — 97112 NEUROMUSCULAR REEDUCATION: CPT | Mod: GP

## 2025-07-23 PROCEDURE — 97110 THERAPEUTIC EXERCISES: CPT | Mod: GP

## 2025-07-23 PROCEDURE — 97140 MANUAL THERAPY 1/> REGIONS: CPT | Mod: GP

## 2025-07-30 ENCOUNTER — THERAPY VISIT (OUTPATIENT)
Dept: PHYSICAL THERAPY | Facility: REHABILITATION | Age: 70
End: 2025-07-30
Payer: COMMERCIAL

## 2025-07-30 DIAGNOSIS — M25.562 ACUTE PAIN OF LEFT KNEE: Primary | ICD-10-CM

## 2025-07-30 PROCEDURE — 97110 THERAPEUTIC EXERCISES: CPT | Mod: GP | Performed by: PHYSICAL THERAPIST

## 2025-07-30 PROCEDURE — 97140 MANUAL THERAPY 1/> REGIONS: CPT | Mod: GP | Performed by: PHYSICAL THERAPIST

## 2025-08-14 ENCOUNTER — THERAPY VISIT (OUTPATIENT)
Dept: PHYSICAL THERAPY | Facility: REHABILITATION | Age: 70
End: 2025-08-14
Payer: COMMERCIAL

## 2025-08-14 DIAGNOSIS — M25.562 ACUTE PAIN OF LEFT KNEE: Primary | ICD-10-CM

## 2025-08-19 ENCOUNTER — TRANSFERRED RECORDS (OUTPATIENT)
Dept: HEALTH INFORMATION MANAGEMENT | Facility: CLINIC | Age: 70
End: 2025-08-19
Payer: COMMERCIAL

## 2025-08-25 DIAGNOSIS — I10 ESSENTIAL HYPERTENSION: ICD-10-CM

## 2025-08-25 RX ORDER — HYDROCHLOROTHIAZIDE 25 MG/1
25 TABLET ORAL DAILY
Qty: 90 TABLET | Refills: 1 | Status: SHIPPED | OUTPATIENT
Start: 2025-08-25

## 2025-08-27 ENCOUNTER — THERAPY VISIT (OUTPATIENT)
Dept: PHYSICAL THERAPY | Facility: REHABILITATION | Age: 70
End: 2025-08-27
Payer: COMMERCIAL

## 2025-08-27 ENCOUNTER — TRANSCRIBE ORDERS (OUTPATIENT)
Dept: OTHER | Age: 70
End: 2025-08-27

## 2025-08-27 DIAGNOSIS — M17.11 OSTEOARTHRITIS OF RIGHT KNEE: Primary | ICD-10-CM

## 2025-08-27 DIAGNOSIS — M25.562 ACUTE PAIN OF LEFT KNEE: Primary | ICD-10-CM

## 2025-08-27 PROCEDURE — 97110 THERAPEUTIC EXERCISES: CPT | Mod: GP | Performed by: PHYSICAL THERAPIST

## 2025-08-27 ASSESSMENT — ACTIVITIES OF DAILY LIVING (ADL)
KNEE_ACTIVITY_OF_DAILY_LIVING_SCORE: 88.57
GO UP STAIRS: ACTIVITY IS MINIMALLY DIFFICULT
HOW_WOULD_YOU_RATE_THE_OVERALL_FUNCTION_OF_YOUR_KNEE_DURING_YOUR_USUAL_DAILY_ACTIVITIES?: NORMAL
STIFFNESS: I HAVE THE SYMPTOM BUT IT DOES NOT AFFECT MY ACTIVITY
LIMPING: I DO NOT HAVE THE SYMPTOM
WEAKNESS: I DO NOT HAVE THE SYMPTOM
AS_A_RESULT_OF_YOUR_KNEE_INJURY,_HOW_WOULD_YOU_RATE_YOUR_CURRENT_LEVEL_OF_DAILY_ACTIVITY?: NORMAL
GIVING WAY, BUCKLING OR SHIFTING OF KNEE: I DO NOT HAVE THE SYMPTOM
SWELLING: I HAVE THE SYMPTOM BUT IT DOES NOT AFFECT MY ACTIVITY
RAW_SCORE: 62
STAND: ACTIVITY IS NOT DIFFICULT
KNEE_ACTIVITY_OF_DAILY_LIVING_SUM: 62
KNEEL ON THE FRONT OF YOUR KNEE: ACTIVITY IS VERY DIFFICULT
GO DOWN STAIRS: ACTIVITY IS MINIMALLY DIFFICULT
HOW_WOULD_YOU_RATE_THE_CURRENT_FUNCTION_OF_YOUR_KNEE_DURING_YOUR_USUAL_DAILY_ACTIVITIES_ON_A_SCALE_FROM_0_TO_100_WITH_100_BEING_YOUR_LEVEL_OF_KNEE_FUNCTION_PRIOR_TO_YOUR_INJURY_AND_0_BEING_THE_INABILITY_TO_PERFORM_ANY_OF_YOUR_USUAL_DAILY_ACTIVITIES?: 95
SIT WITH YOUR KNEE BENT: ACTIVITY IS NOT DIFFICULT
RISE FROM A CHAIR: ACTIVITY IS NOT DIFFICULT
SQUAT: ACTIVITY IS NOT DIFFICULT
PAIN: I DO NOT HAVE THE SYMPTOM
WALK: ACTIVITY IS NOT DIFFICULT